# Patient Record
Sex: FEMALE | Race: BLACK OR AFRICAN AMERICAN | NOT HISPANIC OR LATINO | Employment: FULL TIME | URBAN - METROPOLITAN AREA
[De-identification: names, ages, dates, MRNs, and addresses within clinical notes are randomized per-mention and may not be internally consistent; named-entity substitution may affect disease eponyms.]

---

## 2023-09-13 ENCOUNTER — LAB (OUTPATIENT)
Dept: LAB | Facility: LAB | Age: 63
End: 2023-09-13
Payer: COMMERCIAL

## 2023-09-13 LAB
CHOLESTEROL (MG/DL) IN SER/PLAS: 241 MG/DL (ref 0–199)
CHOLESTEROL IN HDL (MG/DL) IN SER/PLAS: 67.6 MG/DL
CHOLESTEROL/HDL RATIO: 3.6
ESTIMATED AVERAGE GLUCOSE FOR HBA1C: 105 MG/DL
HEMOGLOBIN A1C/HEMOGLOBIN TOTAL IN BLOOD: 5.3 %
LDL: 152 MG/DL (ref 0–99)
THYROTROPIN (MIU/L) IN SER/PLAS BY DETECTION LIMIT <= 0.05 MIU/L: 0.54 MIU/L (ref 0.44–3.98)
TRIGLYCERIDE (MG/DL) IN SER/PLAS: 107 MG/DL (ref 0–149)
VLDL: 21 MG/DL (ref 0–40)

## 2023-10-02 PROBLEM — M54.50 LUMBAGO: Status: ACTIVE | Noted: 2023-10-02

## 2023-10-02 PROBLEM — D64.9 ANEMIA: Status: ACTIVE | Noted: 2022-05-20
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
## 2023-10-02 PROBLEM — G43.009 MIGRAINE WITHOUT AURA AND WITHOUT STATUS MIGRAINOSUS, NOT INTRACTABLE: Status: ACTIVE | Noted: 2023-10-02

## 2023-10-02 PROBLEM — J45.20 MILD INTERMITTENT ASTHMA WITHOUT COMPLICATION (HHS-HCC): Status: ACTIVE | Noted: 2023-10-02

## 2023-10-02 PROBLEM — R07.9 CHEST PAIN: Status: ACTIVE | Noted: 2023-10-02

## 2023-10-02 PROBLEM — N23 KIDNEY PAIN: Status: ACTIVE | Noted: 2023-10-02

## 2023-10-02 PROBLEM — Z87.891 PERSONAL HISTORY OF NICOTINE DEPENDENCE: Status: ACTIVE | Noted: 2022-05-20

## 2023-10-02 PROBLEM — M54.9 BACK PAIN: Status: ACTIVE | Noted: 2023-10-02

## 2023-10-02 PROBLEM — R26.89 OTHER ABNORMALITIES OF GAIT AND MOBILITY: Status: ACTIVE | Noted: 2023-10-02

## 2023-10-02 PROBLEM — Z79.51 LONG TERM (CURRENT) USE OF INHALED STEROIDS: Status: ACTIVE | Noted: 2022-05-20

## 2023-10-02 PROBLEM — E04.9 GOITER: Status: ACTIVE | Noted: 2023-10-02

## 2023-10-02 PROBLEM — J45.909 UNSPECIFIED ASTHMA, UNCOMPLICATED (HHS-HCC): Status: ACTIVE | Noted: 2022-05-20

## 2023-10-02 PROBLEM — Z91.81 HISTORY OF FALLING: Status: ACTIVE | Noted: 2022-05-20

## 2023-10-02 PROBLEM — I10 ESSENTIAL (PRIMARY) HYPERTENSION: Status: ACTIVE | Noted: 2022-05-20

## 2023-10-02 PROBLEM — L98.9 BUMPS ON SKIN: Status: ACTIVE | Noted: 2023-10-02

## 2023-10-02 PROBLEM — N20.0 LEFT NEPHROLITHIASIS: Status: ACTIVE | Noted: 2023-10-02

## 2023-10-02 PROBLEM — M21.00: Status: ACTIVE | Noted: 2022-05-20

## 2023-10-02 PROBLEM — R10.9 LEFT FLANK PAIN: Status: ACTIVE | Noted: 2023-10-02
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
## 2023-10-02 PROBLEM — I10 HYPERTENSION: Status: ACTIVE | Noted: 2023-10-02

## 2023-10-02 PROBLEM — N28.89 RENAL MASS: Status: ACTIVE | Noted: 2023-10-02

## 2023-10-02 PROBLEM — Z47.1 AFTERCARE FOLLOWING JOINT REPLACEMENT SURGERY: Status: ACTIVE | Noted: 2022-05-20

## 2023-10-02 PROBLEM — Z96.649 HIP JOINT REPLACEMENT STATUS: Status: ACTIVE | Noted: 2023-10-02

## 2023-10-02 PROBLEM — M17.12 PRIMARY OSTEOARTHRITIS OF LEFT KNEE: Status: ACTIVE | Noted: 2022-05-20

## 2023-10-02 PROBLEM — E66.9 OBESITY, CLASS I, BMI 30-34.9: Status: ACTIVE | Noted: 2023-10-02

## 2023-10-02 PROBLEM — Z96.643 HISTORY OF HIP REPLACEMENT, TOTAL, BILATERAL: Status: ACTIVE | Noted: 2023-10-02

## 2023-10-02 PROBLEM — Z96.652 PRESENCE OF LEFT ARTIFICIAL KNEE JOINT: Status: ACTIVE | Noted: 2022-05-20

## 2023-10-02 PROBLEM — R11.0 NAUSEA IN ADULT: Status: ACTIVE | Noted: 2023-10-02

## 2023-10-02 PROBLEM — M48.061 SPINAL STENOSIS OF LUMBAR REGION WITHOUT NEUROGENIC CLAUDICATION: Status: ACTIVE | Noted: 2023-10-02

## 2023-10-02 PROBLEM — E78.5 HYPERLIPIDEMIA, UNSPECIFIED: Status: ACTIVE | Noted: 2022-05-20

## 2023-10-02 PROBLEM — E66.811 OBESITY, CLASS I, BMI 30-34.9: Status: ACTIVE | Noted: 2023-10-02

## 2023-10-02 PROBLEM — Z79.891 LONG TERM CURRENT USE OF OPIATE ANALGESIC: Status: ACTIVE | Noted: 2022-05-20

## 2023-10-02 PROBLEM — M17.9 OA (OSTEOARTHRITIS) OF KNEE: Status: ACTIVE | Noted: 2023-10-02

## 2023-10-02 PROBLEM — Z96.643 PRESENCE OF ARTIFICIAL HIP JOINT, BILATERAL: Status: ACTIVE | Noted: 2022-05-20

## 2023-10-02 PROBLEM — L02.91 ABSCESS OF SKIN: Status: ACTIVE | Noted: 2023-10-02

## 2023-10-02 PROBLEM — E78.5 HYPERLIPIDEMIA: Status: ACTIVE | Noted: 2023-10-02

## 2023-10-02 RX ORDER — ALBUTEROL SULFATE 90 UG/1
1-2 AEROSOL, METERED RESPIRATORY (INHALATION) EVERY 6 HOURS PRN
COMMUNITY
Start: 2022-05-01

## 2023-10-02 RX ORDER — TOPIRAMATE 100 MG/1
100 TABLET, FILM COATED ORAL 2 TIMES DAILY
COMMUNITY
End: 2024-02-27 | Stop reason: WASHOUT

## 2023-10-02 RX ORDER — ASCORBIC ACID 500 MG
500 TABLET ORAL DAILY
COMMUNITY
Start: 2016-11-01

## 2023-10-02 RX ORDER — SUMATRIPTAN SUCCINATE 25 MG/1
25 TABLET ORAL DAILY PRN
COMMUNITY

## 2023-10-02 RX ORDER — ASPIRIN 81 MG/1
81 TABLET ORAL ONCE
COMMUNITY
Start: 2022-05-31 | End: 2024-02-12 | Stop reason: SINTOL

## 2023-10-02 RX ORDER — FLUTICASONE PROPIONATE 50 MCG
2 SPRAY, SUSPENSION (ML) NASAL DAILY
COMMUNITY
Start: 2022-05-31 | End: 2023-11-07 | Stop reason: ALTCHOICE

## 2023-10-02 RX ORDER — CALCIUM CARBONATE 400(1000)
800 TABLET,CHEWABLE ORAL EVERY 2 HOUR PRN
COMMUNITY
Start: 2022-05-27 | End: 2023-11-07 | Stop reason: ALTCHOICE

## 2023-10-02 RX ORDER — ACETAMINOPHEN 500 MG
500 TABLET ORAL EVERY 4 HOURS PRN
COMMUNITY
Start: 2023-07-10 | End: 2024-02-24 | Stop reason: HOSPADM

## 2023-10-02 RX ORDER — DOCUSATE SODIUM 100 MG/1
100 CAPSULE, LIQUID FILLED ORAL 2 TIMES DAILY
COMMUNITY
End: 2023-11-07 | Stop reason: ALTCHOICE

## 2023-10-02 RX ORDER — MINERAL OIL
180 ENEMA (ML) RECTAL DAILY PRN
COMMUNITY
Start: 2020-05-26

## 2023-10-02 RX ORDER — IBUPROFEN 800 MG/1
800 TABLET ORAL EVERY 6 HOURS PRN
COMMUNITY
Start: 2023-07-10 | End: 2023-11-07 | Stop reason: ALTCHOICE

## 2023-10-02 RX ORDER — MULTIVIT-MIN/IRON/FOLIC ACID/K 45-800-120
CAPSULE ORAL
COMMUNITY
Start: 2016-11-01 | End: 2024-03-05 | Stop reason: WASHOUT

## 2023-10-02 RX ORDER — AMOXICILLIN 500 MG/1
500 CAPSULE ORAL EVERY 8 HOURS SCHEDULED
COMMUNITY
Start: 2023-07-10 | End: 2023-10-30

## 2023-10-02 RX ORDER — NEBULIZER AND COMPRESSOR
EACH MISCELLANEOUS
COMMUNITY
Start: 2020-09-28 | End: 2024-03-05 | Stop reason: WASHOUT

## 2023-10-02 RX ORDER — OXYCODONE HYDROCHLORIDE 5 MG/1
5 CAPSULE ORAL EVERY 6 HOURS PRN
COMMUNITY
Start: 2022-05-31 | End: 2023-11-07 | Stop reason: ALTCHOICE

## 2023-10-02 RX ORDER — HYDROCHLOROTHIAZIDE 25 MG/1
25 TABLET ORAL DAILY
COMMUNITY
Start: 2022-05-01 | End: 2023-11-07 | Stop reason: SDUPTHER

## 2023-10-02 RX ORDER — CLINDAMYCIN HYDROCHLORIDE 150 MG/1
150 CAPSULE ORAL EVERY 6 HOURS
COMMUNITY
Start: 2023-08-23 | End: 2023-11-07 | Stop reason: ALTCHOICE

## 2023-10-02 RX ORDER — ACETAMINOPHEN 325 MG/1
325 TABLET ORAL EVERY 6 HOURS PRN
COMMUNITY
Start: 2022-05-20 | End: 2024-03-19 | Stop reason: WASHOUT

## 2023-10-02 RX ORDER — KETOROLAC TROMETHAMINE 10 MG/1
1 TABLET, FILM COATED ORAL EVERY 6 HOURS PRN
COMMUNITY
Start: 2023-08-25 | End: 2023-11-07 | Stop reason: ALTCHOICE

## 2023-10-02 RX ORDER — METOPROLOL SUCCINATE 50 MG/1
50 TABLET, EXTENDED RELEASE ORAL DAILY
COMMUNITY
Start: 2022-05-01 | End: 2023-11-07 | Stop reason: SDUPTHER

## 2023-10-02 RX ORDER — NITROGLYCERIN 0.4 MG/1
0.4 TABLET SUBLINGUAL EVERY 5 MIN PRN
COMMUNITY

## 2023-10-02 RX ORDER — ALBUTEROL SULFATE 90 UG/1
2 AEROSOL, METERED RESPIRATORY (INHALATION) EVERY 6 HOURS PRN
COMMUNITY
End: 2024-02-24 | Stop reason: HOSPADM

## 2023-10-02 RX ORDER — ONDANSETRON 4 MG/1
4 TABLET, ORALLY DISINTEGRATING ORAL EVERY 8 HOURS PRN
COMMUNITY
Start: 2021-05-25 | End: 2023-11-07 | Stop reason: ALTCHOICE

## 2023-10-02 RX ORDER — NAPROXEN 500 MG/1
500 TABLET ORAL 2 TIMES DAILY
COMMUNITY
Start: 2023-08-23 | End: 2023-11-07 | Stop reason: ALTCHOICE

## 2023-10-02 RX ORDER — METHOCARBAMOL 500 MG/1
500 TABLET, FILM COATED ORAL NIGHTLY
COMMUNITY
Start: 2023-05-26 | End: 2023-11-07 | Stop reason: ALTCHOICE

## 2023-10-23 ENCOUNTER — HOSPITAL ENCOUNTER (EMERGENCY)
Facility: HOSPITAL | Age: 63
End: 2023-10-23
Payer: COMMERCIAL

## 2023-10-25 ENCOUNTER — LAB REQUISITION (OUTPATIENT)
Dept: LAB | Facility: HOSPITAL | Age: 63
End: 2023-10-25
Payer: COMMERCIAL

## 2023-10-25 LAB — SARS-COV-2 RNA RESP QL NAA+PROBE: NOT DETECTED

## 2023-10-25 PROCEDURE — 87635 SARS-COV-2 COVID-19 AMP PRB: CPT

## 2023-10-30 ENCOUNTER — HOSPITAL ENCOUNTER (EMERGENCY)
Facility: HOSPITAL | Age: 63
Discharge: HOME | End: 2023-10-30
Payer: COMMERCIAL

## 2023-10-30 VITALS
TEMPERATURE: 97.6 F | OXYGEN SATURATION: 98 % | RESPIRATION RATE: 18 BRPM | SYSTOLIC BLOOD PRESSURE: 164 MMHG | DIASTOLIC BLOOD PRESSURE: 83 MMHG | HEART RATE: 85 BPM

## 2023-10-30 DIAGNOSIS — J02.9 ACUTE PHARYNGITIS, UNSPECIFIED ETIOLOGY: Primary | ICD-10-CM

## 2023-10-30 PROCEDURE — 99284 EMERGENCY DEPT VISIT MOD MDM: CPT | Performed by: NURSE PRACTITIONER

## 2023-10-30 PROCEDURE — S0119 ONDANSETRON 4 MG: HCPCS | Performed by: NURSE PRACTITIONER

## 2023-10-30 PROCEDURE — 2500000001 HC RX 250 WO HCPCS SELF ADMINISTERED DRUGS (ALT 637 FOR MEDICARE OP): Performed by: NURSE PRACTITIONER

## 2023-10-30 PROCEDURE — 99283 EMERGENCY DEPT VISIT LOW MDM: CPT

## 2023-10-30 PROCEDURE — 2500000005 HC RX 250 GENERAL PHARMACY W/O HCPCS: Performed by: NURSE PRACTITIONER

## 2023-10-30 PROCEDURE — 2500000004 HC RX 250 GENERAL PHARMACY W/ HCPCS (ALT 636 FOR OP/ED): Performed by: NURSE PRACTITIONER

## 2023-10-30 RX ORDER — AMOXICILLIN 250 MG/1
500 CAPSULE ORAL ONCE
Status: COMPLETED | OUTPATIENT
Start: 2023-10-30 | End: 2023-10-30

## 2023-10-30 RX ORDER — ONDANSETRON 4 MG/1
4 TABLET, ORALLY DISINTEGRATING ORAL ONCE
Status: COMPLETED | OUTPATIENT
Start: 2023-10-30 | End: 2023-10-30

## 2023-10-30 RX ORDER — AMOXICILLIN 500 MG/1
500 CAPSULE ORAL EVERY 12 HOURS SCHEDULED
Qty: 20 CAPSULE | Refills: 0 | Status: SHIPPED | OUTPATIENT
Start: 2023-10-30 | End: 2023-11-07 | Stop reason: ALTCHOICE

## 2023-10-30 RX ADMIN — ONDANSETRON 4 MG: 4 TABLET, ORALLY DISINTEGRATING ORAL at 19:52

## 2023-10-30 RX ADMIN — AMOXICILLIN 500 MG: 250 CAPSULE ORAL at 19:51

## 2023-10-30 RX ADMIN — DEXAMETHASONE 8 MG: 2 TABLET ORAL at 19:52

## 2023-10-30 ASSESSMENT — COLUMBIA-SUICIDE SEVERITY RATING SCALE - C-SSRS
2. HAVE YOU ACTUALLY HAD ANY THOUGHTS OF KILLING YOURSELF?: NO
6. HAVE YOU EVER DONE ANYTHING, STARTED TO DO ANYTHING, OR PREPARED TO DO ANYTHING TO END YOUR LIFE?: NO
1. IN THE PAST MONTH, HAVE YOU WISHED YOU WERE DEAD OR WISHED YOU COULD GO TO SLEEP AND NOT WAKE UP?: NO

## 2023-10-30 ASSESSMENT — ENCOUNTER SYMPTOMS: SORE THROAT: 1

## 2023-10-30 NOTE — ED PROVIDER NOTES
Emergency Department Encounter  [facility] EMERGENCY MEDICINE    Patient: Juju Adams  MRN: [id number]  : 1960  Date of Evaluation: 10/30/2023  ED Provider: MAGAN Butt      Chief Complaint       Chief Complaint   Patient presents with    Sore Throat     Sore throat X 1 week, took covid negative, pt talking in complete sentences         Limitations to History: none  Historian: patient  Records reviewed: EMR inpatient and outpatient notes, Care Everywhere    This is a 63-year-old female who presents to the emergency room with a sore throat and ear pain.  Patient states that she has had a sore throat for over 1 week.  Patient states that she took a COVID test 1 week ago which was negative.  Patient states that her pain got worse last night.  Patient endorses a subjective fever and has pain with coughing.  Patient describes her pain as a throbbing, constant pain rating it a 9 out of 10.  Patient has been taking Tylenol and cough drops without any relief of symptoms.  Patient states that her manager sent her down to the emergency room to be evaluated for her symptoms.    PMH: HTN  PSH: Bilateral hip surgery. Left knee surgery  Allergies: Sulfa  Social HX: Denies smoking, alcohol or drug use.  Family HX: No family history pertinent to current presenting problem  Medications: reviewed per EMR      ROS:     Review of Systems   HENT:  Positive for ear pain and sore throat.      14 systems reviewed and otherwise acutely negative except as in the [place].    Physical Exam:    Appearance: Alert, oriented , cooperative,  in no acute distress. Well nourished & well hydrated.    Skin: Intact,  dry skin, no lesions, rash, petechiae or purpura.     ENT: Hearing grossly intact. External auditory canals patent, tympanic membranes intact with visible landmarks. Nares patent, mucus membranes moist. Dentition without lesions. Pharynx clear, uvula midline. Mild posterior pharynx erythema. No  exudate.    Neck: Supple, without meningismus.     Pulmonary: Clear bilaterally with good chest wall excursion. No rales, rhonchi or wheezing. No accessory muscle use or stridor.    Cardiac: Normal S1, S2 without murmur, rub, gallop or extrasystole. No JVD, Carotids without bruits.    Abdomen: Soft, nontender, active bowel sounds.  No palpable organomegaly.  No rebound or guarding.      Musculoskeletal: Full range of motion. no pain, edema, or deformity. Pulses full and equal. No cyanosis, clubbing, or edema.    Psychiatric: Appropriate mood and affect.     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    Other specified cough 02/18/2019    Post-viral cough syndrome    Personal history of other diseases of the respiratory system 12/26/2017    History of acute bronchitis with bronchospasm    Personal history of other diseases of the respiratory system 07/31/2018    History of acute sinusitis    Personal history of other drug therapy 11/01/2016    History of influenza vaccination    Personal history of other infectious and parasitic diseases 04/13/2015    History of herpes zoster     Past Surgical History:   Procedure Laterality Date    TOTAL HIP ARTHROPLASTY  02/13/2014    Total Hip Replacement    TUBAL LIGATION  01/04/2018    Tubal Ligation         Medications/Allergies     Previous Medications    ACETAMINOPHEN (TYLENOL) 325 MG TABLET    Take 1 tablet (325 mg) by mouth every 4 hours if needed.    ACETAMINOPHEN (TYLENOL) 500 MG TABLET    Take 1 tablet (500 mg) by mouth every 4 hours if needed.    ALBUTEROL 90 MCG/ACTUATION INHALER    Inhale 2 puffs every 4 hours if needed.    ASCORBIC ACID, VITAMIN C, 500 MG CAPSULE    Take by mouth.    ASPIRIN (ADULT LOW DOSE ASPIRIN) 81 MG EC TABLET    Take 1 tablet (81 mg) by mouth 1 time.    ASPIRIN-CALCIUM CARBONATE 81 MG-300 MG CALCIUM(777 MG) TABLET    Take 1 tablet by mouth once daily.     CALCIUM CARBONATE (TUMS ULTRA) 400 MG CALCIUM (1,000 MG) CHEWABLE TABLET    Chew 2 tablets (800 mg) every 2 hours if needed for indigestion.    CEPHALEXIN (KEFLEX) 500 MG CAPSULE    TAKE 1 CAPSULE BY MOUTH FOUR TIMES A DAY    CLINDAMYCIN (CLEOCIN) 150 MG CAPSULE    Take 1 capsule (150 mg) by mouth every 6 hours.    DICLOFENAC SODIUM 1 % KIT    APPLY 2 TO 4 GRAMS EXTERNALLY TO THE AFFECTED AREA FOUR TIMES DAILY    DOCUSATE SODIUM (COLACE) 100 MG CAPSULE    Take 1 capsule (100 mg) by mouth 2 times a day.    FEXOFENADINE (ALLEGRA ALLERGY) 180 MG TABLET    Take 1 tablet (180 mg) by mouth once daily.    FLUTICASONE (FLONASE) 50 MCG/ACTUATION NASAL SPRAY    Administer 2 sprays into each nostril once daily.    HYDROCHLOROTHIAZIDE (HYDRODIURIL) 25 MG TABLET    TAKE 1 TABLET BY MOUTH EVERY DAY AS DIRECTED    HYDROCHLOROTHIAZIDE (HYDRODIURIL) 25 MG TABLET    Take 1 tablet (25 mg) by mouth once daily.    IBUPROFEN 800 MG TABLET    Take 1 tablet (800 mg) by mouth every 6 hours if needed.    KETOROLAC (TORADOL) 10 MG TABLET    Take 1 tablet (10 mg) by mouth every 6 hours if needed.    METHOCARBAMOL (ROBAXIN) 500 MG TABLET    Take 1 tablet (500 mg) by mouth once daily at bedtime.    METOPROLOL SUCCINATE XL (TOPROL-XL) 50 MG 24 HR TABLET    TAKE 1 TABLET BY MOUTH EVERY DAY    METOPROLOL SUCCINATE XL (TOPROL-XL) 50 MG 24 HR TABLET    Take 1 tablet (50 mg) by mouth once daily.    MULTIVITAMIN-MIN-IRON-FA-VIT K (BARIATRIC MULTIVITAMINS) 45 MG IRON- 800 MCG-120 MCG CAPSULE    Take by mouth.    NAPROXEN (NAPROSYN) 500 MG TABLET    Take 1 tablet (500 mg) by mouth 2 times a day.    NEBULIZER AND COMPRESSOR DEVICE    USE AS DIRECTED.    NITROGLYCERIN (NITROSTAT) 0.4 MG SL TABLET    Place 1 tablet (0.4 mg) under the tongue every 5 minutes if needed.    ONDANSETRON ODT (ZOFRAN-ODT) 4 MG DISINTEGRATING TABLET    Take 1 tablet (4 mg) by mouth every 8 hours if needed for nausea or vomiting.    OXYCODONE (OXY-IR) 5 MG IMMEDIATE RELEASE CAPSULE     Take 1 capsule (5 mg) by mouth every 6 hours if needed.    POLYETHYLENE GLYCOL 3350 ORAL    Take 17 g by mouth twice a day.    SIMVASTATIN (ZOCOR) 10 MG TABLET    TAKE 1 TABLET BY MOUTH EVERYDAY AT BEDTIME    SIMVASTATIN (ZOCOR) 10 MG TABLET    Take 1 tablet (10 mg) by mouth once daily at bedtime.    SUMATRIPTAN (IMITREX) 25 MG TABLET    Take 1 tablet (25 mg) by mouth 1 time if needed.    TOPIRAMATE (TOPAMAX) 100 MG TABLET    Take 1 tablet (100 mg) by mouth 2 times a day.    VENTOLIN HFA 90 MCG/ACTUATION INHALER    Inhale 2 puffs every 6 hours if needed.     Allergies   Allergen Reactions    Latex Unknown    Other Unknown    Sulfa (Sulfonamide Antibiotics) Unknown and Hives    Triethanolamine Oleate Unknown        Physical Exam       ED Triage Vitals [10/30/23 1718]   Temp Heart Rate Resp BP   36.4 °C (97.6 °F) 95 16 164/83      SpO2 Temp src Heart Rate Source Patient Position   100 % -- -- --      BP Location FiO2 (%)     -- --           Diagnostics   Labs:  No results found for this or any previous visit (from the past 24 hour(s)).   Radiographs:  No orders to display         Assessment   In brief, Juju Adams is a 63 y.o. female who presented to the emergency department with a sore throat and left ear pain.          ED Course   Visit Vitals  /83   Pulse 95   Temp 36.4 °C (97.6 °F)   Resp 16   SpO2 100%   OB Status Perimenopausal   Smoking Status Never Assessed       Medications   dexAMETHasone (Decadron) tablet 8 mg (has no administration in time range)   amoxicillin (Amoxil) capsule 500 mg (has no administration in time range)   ondansetron ODT (Zofran-ODT) disintegrating tablet 4 mg (has no administration in time range)       Patient remained stable while in the emergency department. Previous outpatient and ED records were reviewed. Outside records were reviewed.  Strep swab was obtained, pending results.  Patient received dexamethasone 8 mg p.o., amoxicillin and Zofran 4 mg ODT.  There was no  exudate or tonsillar abscess noted on exam today.  Airways intact and vital signs were stable.  Since symptoms have been ongoing for over 1 week with subjective fevers yesterday, patient will be treated for acute pharyngitis.  Patient received a prescription for amoxicillin twice daily for 10 days and was advised to take over-the-counter pain medications as needed. Patient was advised to follow up with her PCP and return to the emergency department with worsening symptoms.    Final Impression      1. Acute pharyngitis, unspecified etiology          DISPOSITION  Disposition: Discharged home    Comment: Please note this report has been produced using speech recognition software and may contain errors related to that system including errors in grammar, punctuation, and spelling, as well as words and phrases that may be inappropriate.  If there are any questions or concerns please feel free to contact the dictating provider for clarification.    MICHELLE Butt-MAGAN Navarro  10/30/23 1947

## 2023-10-31 ENCOUNTER — PHARMACY VISIT (OUTPATIENT)
Dept: PHARMACY | Facility: CLINIC | Age: 63
End: 2023-10-31
Payer: COMMERCIAL

## 2023-10-31 PROCEDURE — RXMED WILLOW AMBULATORY MEDICATION CHARGE

## 2023-10-31 RX ORDER — AMOXICILLIN 500 MG/1
500 CAPSULE ORAL EVERY 12 HOURS
Qty: 20 CAPSULE | Refills: 0 | OUTPATIENT
Start: 2023-10-30 | End: 2023-11-07 | Stop reason: ALTCHOICE

## 2023-11-02 ENCOUNTER — HOSPITAL ENCOUNTER (EMERGENCY)
Facility: HOSPITAL | Age: 63
Discharge: HOME | End: 2023-11-03
Attending: EMERGENCY MEDICINE
Payer: COMMERCIAL

## 2023-11-02 DIAGNOSIS — N95.0 POSTMENOPAUSAL VAGINAL BLEEDING: Primary | ICD-10-CM

## 2023-11-02 DIAGNOSIS — K64.2 GRADE III HEMORRHOIDS: ICD-10-CM

## 2023-11-02 LAB
ABO GROUP (TYPE) IN BLOOD: NORMAL
ALBUMIN SERPL BCP-MCNC: 4.3 G/DL (ref 3.4–5)
ALP SERPL-CCNC: 117 U/L (ref 33–136)
ALT SERPL W P-5'-P-CCNC: 17 U/L (ref 7–45)
ANION GAP SERPL CALC-SCNC: 13 MMOL/L (ref 10–20)
ANTIBODY SCREEN: NORMAL
APTT PPP: 32 SECONDS (ref 27–38)
AST SERPL W P-5'-P-CCNC: 14 U/L (ref 9–39)
BASOPHILS # BLD AUTO: 0.07 X10*3/UL (ref 0–0.1)
BASOPHILS NFR BLD AUTO: 0.8 %
BILIRUB SERPL-MCNC: 0.4 MG/DL (ref 0–1.2)
BUN SERPL-MCNC: 23 MG/DL (ref 6–23)
CALCIUM SERPL-MCNC: 10.4 MG/DL (ref 8.6–10.6)
CHLORIDE SERPL-SCNC: 101 MMOL/L (ref 98–107)
CO2 SERPL-SCNC: 30 MMOL/L (ref 21–32)
CREAT SERPL-MCNC: 0.9 MG/DL (ref 0.5–1.05)
EOSINOPHIL # BLD AUTO: 0.17 X10*3/UL (ref 0–0.7)
EOSINOPHIL NFR BLD AUTO: 1.9 %
ERYTHROCYTE [DISTWIDTH] IN BLOOD BY AUTOMATED COUNT: 12.5 % (ref 11.5–14.5)
GFR SERPL CREATININE-BSD FRML MDRD: 72 ML/MIN/1.73M*2
GLUCOSE SERPL-MCNC: 89 MG/DL (ref 74–99)
HCT VFR BLD AUTO: 44.1 % (ref 36–46)
HGB BLD-MCNC: 14.3 G/DL (ref 12–16)
IMM GRANULOCYTES # BLD AUTO: 0.03 X10*3/UL (ref 0–0.7)
IMM GRANULOCYTES NFR BLD AUTO: 0.3 % (ref 0–0.9)
INR PPP: 0.9 (ref 0.9–1.1)
LYMPHOCYTES # BLD AUTO: 3.69 X10*3/UL (ref 1.2–4.8)
LYMPHOCYTES NFR BLD AUTO: 41.5 %
MCH RBC QN AUTO: 31.3 PG (ref 26–34)
MCHC RBC AUTO-ENTMCNC: 32.4 G/DL (ref 32–36)
MCV RBC AUTO: 97 FL (ref 80–100)
MONOCYTES # BLD AUTO: 0.66 X10*3/UL (ref 0.1–1)
MONOCYTES NFR BLD AUTO: 7.4 %
NEUTROPHILS # BLD AUTO: 4.27 X10*3/UL (ref 1.2–7.7)
NEUTROPHILS NFR BLD AUTO: 48.1 %
NRBC BLD-RTO: 0 /100 WBCS (ref 0–0)
PLATELET # BLD AUTO: 294 X10*3/UL (ref 150–450)
POTASSIUM SERPL-SCNC: 3.6 MMOL/L (ref 3.5–5.3)
PROT SERPL-MCNC: 8 G/DL (ref 6.4–8.2)
PROTHROMBIN TIME: 9.8 SECONDS (ref 9.8–12.8)
RBC # BLD AUTO: 4.57 X10*6/UL (ref 4–5.2)
RH FACTOR (ANTIGEN D): NORMAL
SODIUM SERPL-SCNC: 140 MMOL/L (ref 136–145)
WBC # BLD AUTO: 8.9 X10*3/UL (ref 4.4–11.3)

## 2023-11-02 PROCEDURE — 99285 EMERGENCY DEPT VISIT HI MDM: CPT | Performed by: EMERGENCY MEDICINE

## 2023-11-02 PROCEDURE — 80053 COMPREHEN METABOLIC PANEL: CPT | Performed by: EMERGENCY MEDICINE

## 2023-11-02 PROCEDURE — 85025 COMPLETE CBC W/AUTO DIFF WBC: CPT | Performed by: EMERGENCY MEDICINE

## 2023-11-02 PROCEDURE — 96374 THER/PROPH/DIAG INJ IV PUSH: CPT

## 2023-11-02 PROCEDURE — 36415 COLL VENOUS BLD VENIPUNCTURE: CPT | Performed by: EMERGENCY MEDICINE

## 2023-11-02 PROCEDURE — 85610 PROTHROMBIN TIME: CPT | Performed by: EMERGENCY MEDICINE

## 2023-11-02 PROCEDURE — 86901 BLOOD TYPING SEROLOGIC RH(D): CPT | Performed by: EMERGENCY MEDICINE

## 2023-11-02 PROCEDURE — 96375 TX/PRO/DX INJ NEW DRUG ADDON: CPT

## 2023-11-02 PROCEDURE — 85730 THROMBOPLASTIN TIME PARTIAL: CPT | Performed by: EMERGENCY MEDICINE

## 2023-11-02 ASSESSMENT — COLUMBIA-SUICIDE SEVERITY RATING SCALE - C-SSRS
1. IN THE PAST MONTH, HAVE YOU WISHED YOU WERE DEAD OR WISHED YOU COULD GO TO SLEEP AND NOT WAKE UP?: NO
2. HAVE YOU ACTUALLY HAD ANY THOUGHTS OF KILLING YOURSELF?: NO
6. HAVE YOU EVER DONE ANYTHING, STARTED TO DO ANYTHING, OR PREPARED TO DO ANYTHING TO END YOUR LIFE?: NO

## 2023-11-02 NOTE — Clinical Note
Juju Adams was seen and treated in our emergency department on 11/2/2023.  She may return to work on 11/06/2023.       If you have any questions or concerns, please don't hesitate to call.      Shonna Salcedo MD

## 2023-11-02 NOTE — ED TRIAGE NOTES
VIRIDIANA HIGHTOWER is a 63y old F with a PMHx significant for HTN is presenting to [facility] ED with a chief concern for both vaginal and rectal bleeding. Onset of symptoms began around 1800 tonight. Pt works here at  in the [employer] and stated when she arrived home, she use the restroom and noticed dark red blood with dime sized clots. Pt states she only had two liners at home, and used both within a one-hour period. Pt also endorses associated lower abdominal cramping. OF NOTE: Pt  does take ASA 81mg daily and states she has a known cyst on her L kidney. She is afebrile, without dizziness, or syncopal-like episodes. No recent falls or injuries verbalized. Denies any nausea, vomiting, constipation or current diarrhea. No recent sick contacts or COVID symptoms. Allergies documented in EMR.

## 2023-11-03 ENCOUNTER — APPOINTMENT (OUTPATIENT)
Dept: RADIOLOGY | Facility: HOSPITAL | Age: 63
End: 2023-11-03
Payer: COMMERCIAL

## 2023-11-03 VITALS
WEIGHT: 185 LBS | HEART RATE: 85 BPM | BODY MASS INDEX: 32.78 KG/M2 | OXYGEN SATURATION: 100 % | DIASTOLIC BLOOD PRESSURE: 78 MMHG | HEIGHT: 63 IN | SYSTOLIC BLOOD PRESSURE: 148 MMHG | TEMPERATURE: 97.5 F | RESPIRATION RATE: 18 BRPM

## 2023-11-03 PROCEDURE — 2550000001 HC RX 255 CONTRASTS

## 2023-11-03 PROCEDURE — 74177 CT ABD & PELVIS W/CONTRAST: CPT

## 2023-11-03 PROCEDURE — 76830 TRANSVAGINAL US NON-OB: CPT | Mod: 59,GC | Performed by: RADIOLOGY

## 2023-11-03 PROCEDURE — 74177 CT ABD & PELVIS W/CONTRAST: CPT | Performed by: RADIOLOGY

## 2023-11-03 PROCEDURE — 2500000004 HC RX 250 GENERAL PHARMACY W/ HCPCS (ALT 636 FOR OP/ED)

## 2023-11-03 PROCEDURE — 76830 TRANSVAGINAL US NON-OB: CPT

## 2023-11-03 PROCEDURE — 76856 US EXAM PELVIC COMPLETE: CPT | Performed by: RADIOLOGY

## 2023-11-03 RX ORDER — ONDANSETRON HYDROCHLORIDE 2 MG/ML
4 INJECTION, SOLUTION INTRAVENOUS ONCE
Status: COMPLETED | OUTPATIENT
Start: 2023-11-03 | End: 2023-11-03

## 2023-11-03 RX ORDER — MORPHINE SULFATE 4 MG/ML
2 INJECTION INTRAVENOUS ONCE
Status: COMPLETED | OUTPATIENT
Start: 2023-11-03 | End: 2023-11-03

## 2023-11-03 RX ADMIN — MORPHINE SULFATE 2 MG: 4 INJECTION INTRAVENOUS at 01:47

## 2023-11-03 RX ADMIN — ONDANSETRON 4 MG: 2 INJECTION INTRAMUSCULAR; INTRAVENOUS at 01:47

## 2023-11-03 RX ADMIN — IOHEXOL 100 ML: 350 INJECTION, SOLUTION INTRAVENOUS at 04:26

## 2023-11-03 ASSESSMENT — PAIN SCALES - GENERAL
PAINLEVEL_OUTOF10: 6
PAINLEVEL_OUTOF10: 4
PAINLEVEL_OUTOF10: 0 - NO PAIN
PAINLEVEL_OUTOF10: 8

## 2023-11-03 ASSESSMENT — PAIN DESCRIPTION - PAIN TYPE: TYPE: ACUTE PAIN

## 2023-11-03 ASSESSMENT — PAIN - FUNCTIONAL ASSESSMENT: PAIN_FUNCTIONAL_ASSESSMENT: 0-10

## 2023-11-03 ASSESSMENT — LIFESTYLE VARIABLES
EVER HAD A DRINK FIRST THING IN THE MORNING TO STEADY YOUR NERVES TO GET RID OF A HANGOVER: NO
HAVE YOU EVER FELT YOU SHOULD CUT DOWN ON YOUR DRINKING: NO
REASON UNABLE TO ASSESS: NO
EVER FELT BAD OR GUILTY ABOUT YOUR DRINKING: NO
HAVE PEOPLE ANNOYED YOU BY CRITICIZING YOUR DRINKING: NO

## 2023-11-03 NOTE — ED PROVIDER NOTES
CC: Vaginal Bleeding and Rectal Bleeding       ???????????????????????????????????????????????????????????????    HPI   Chief Complaint   Patient presents with    Vaginal Bleeding    Rectal Bleeding         History provided by:  Patient   used: Lindy      Juju Adams is a 63 y.o. female with a hx of htn, HLD, asthma, renal cyst, previous history of fibroids and nephrolithiasis who presents to ED for vaginal and rectal bleeding.     Patient states when she got home from work this evening she went to the bathroom around and saw bright red blood in the toilet as well as dime-sized clots. At approximately the same time she began having LLQ and L flank pain, 8/10 in severity and constant. Per patient, bleeding is from both the vagina and the rectum. She went through 2 pantyliners in 1 hour at home prior to presenting to the ED, however once given a regular pad at the ED is bleeding <1 pad per hour. Patient is post-menopausal; LMP 14 years ago. Not on anticoagulation. Has not been dizzy, lightheaded, short of breath. No history of bleeding disorder or GI bleed in past, no history of malignancy, no family history of colorectal or gynecologic cancers. Has not had a colonoscopy. Last bowel movement yesterday.    Patient believes the bleeding is slowing.     Remote history of fibroids, heavy periods prior to menopause, nephrolithiasis.    Denies LH, syncope, CP or SOB. No nausea, vomiting, diarrhea, constipation.    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    Other specified cough 02/18/2019    Post-viral cough syndrome    Personal history of other diseases of the respiratory system 12/26/2017    History of acute bronchitis with bronchospasm    Personal history of other diseases of the respiratory system 07/31/2018    History of acute sinusitis    Personal history of  other drug therapy 11/01/2016    History of influenza vaccination    Personal history of other infectious and parasitic diseases 04/13/2015    History of herpes zoster     Past Surgical History:   Procedure Laterality Date    TOTAL HIP ARTHROPLASTY  02/13/2014    Total Hip Replacement    TUBAL LIGATION  01/04/2018    Tubal Ligation     Family History   Problem Relation Name Age of Onset    Hypertension Mother      Heart attack Mother      Hypertension Father      Heart attack Father      Stroke Father      Diabetes Sister      Hypertension Sister      Seizures Sister       Social History     Tobacco Use    Smoking status: Not on file    Smokeless tobacco: Not on file   Substance Use Topics    Alcohol use: Not on file    Drug use: Not on file       Physical Exam   ED Triage Vitals [11/02/23 1946]   Temp Heart Rate Resp BP   36.7 °C (98.1 °F) 99 18 144/83      SpO2 Temp Source Heart Rate Source Patient Position   100 % Oral Monitor Sitting      BP Location FiO2 (%)     Right arm --       Physical Exam  ???????????????????????????????????????????????????????????????  Triage Vitals:  T 36.7 °C (98.1 °F)  HR 99  /83  RR 18  O2 100 % None (Room air)    Vital signs reviewed in nursing triage note, EMR flow sheets, and at patient's bedside.   General: Awake, alert, in no acute distress  Eyes: Gaze conjugate.  No scleral icterus or injection  HENT: Normo-cephalic, atraumatic. No stridor. No rhinorrhea or epistaxis.  CV: Normal rate, regular rhythm. No murmurs appreciated. Radial pulses 2+ bilaterally  Respiratory: Breathing non-labored, speaking in full sentences.  Clear to auscultation bilaterally  GI: Soft, non-distended, no rebound or guarding. Mild tenderness to palpation over LLQ. On rectal exam there was one grade III external hemorrhoid, approx. 1.5cm in diameter which appeared irritated, however no active bleeding. No anal fissures and no blood on digital exam.  MSK/Extremities: No gross bony deformities.  Moving all extremities  Skin: Warm. Appropriate color. No mucosal pallor.  Neuro: Alert. Oriented. Face symmetric. Speech is fluent.  Gross strength and sensation intact in b/l UE and LEs  Psych: Appropriate mood and affect   : No labial or vaginal lesions. No blood or discharge within the vaginal vault, normal cervix coloration. No CMT or adnexal tenderness, however patient c/o pressure with palpation of L adnexa. No masses.     ED Course & MDM   Diagnoses as of 11/03/23 0325   Postmenopausal vaginal bleeding   Grade III hemorrhoids         Medical Decision Making  Juju Adams is a 63 y.o. female who presented to ED with rectal and vaginal bleeding. On exam she was well appearing.  Vital signs were reviewed w/o tachycardia or hypotension. Abd was TTP in LLQ. Pelvic exam w/o active bleeding or blood in the vaginal vault as well as pressure with palpation of L adnexa. Rectal exam with no fissures, single external hemorrhoid w/o evidence of active bleeding, no mass or blood on digital rectal exam. CBC w/o evidence of anemia, coag screen negative, CMP unremarkable. Transvaginal U/S significant for multi-fibroid uterus, fluid-filled endometrium, endometrium of normal thickness, echogenic lesion invaginating into the endometrium, with feeding artery sign, which may represent a pedunculated endometrial polyp versus pedunculated submucosal fibroid. Spoke with Ob/Gyn consultant who advised that outpatient follow up is appropriate as long as the patient is stable.    It is likely the patient's vaginal bleeding is related to the endometrial lesion seen on transvaginal U/S. Endometrium was of normal thickness so lower concern for endometrial cancer. Recommended patient follow up with Ob/Gyn as outpatient. Patient has never had screening colonoscopy and cannot rule out malignancy with CT. Low concern for peptic ulcer disease given lower GI bleeding, location of pain. Patient has hx of tubal ligation, otherwise no  abdominal surgeries so enterovaginal fistula is unlikely.     She is hemodynamically stable and does not require admission for significant active ongoing vaginal or rectal bleeding or symptomatic anemia. Given strict return precautions including any lightheadedness, difficulty breathing, chest pain, passing out, bleeding > 1pad/hr.      Independent Interpretation of Studies:  I independently interpreted:     Differential diagnoses considered include but are not limited to: Diverticulitis, hemorrhoid, enterovaginal fistula, malignancy, peptic ulcer disease, ischemic colitis       Social Determinants Limiting Care:  None identified    Discussion of Management with Other Providers:   I discussed the patient/results with Ob/Gyn consultant    Impression:  Postmenopausal bleeding, endometrial lesion, grade III external hemorrhoid    Disposition:  Discharge with Ob/Gyn and GI follow-up.      Patient seen and discussed with ED attending Dr. Inocencio Cuellar.    Shonna Salcedo MD   Emergency Medicine, PGY-1         _____________________________      This patient was seen by the resident physician.  I have seen and examined the patient, agree with the workup, evaluation, management and diagnosis. The care plan has been discussed and I concur.    My assessment reveals Patient is a 63-year-old female presenting to the emergency department for rectal and vaginal bleeding.  Patient is on a daily baby aspirin.  Patient this evening noted at 6 PM that she started to pass vaginal blood when she had wiped, in addition to blood from her rectum.  She also had a bloody bowel movement associated with left lower quadrant abdominal discomfort.  Has never had a GI bleed before.  She is tender in the left lower quadrant.  Vital signs were stable.  Rectal exam did show hemorrhoid but no gross blood.  No ongoing vaginal bleeding at this time.  Patient's labs showed a normal hemoglobin, no BUN elevation concerning for upper GI bleed, normal coags.   Patient has left lower quadrant tenderness.  Given patient's complaints, we will obtain CT and ultrasound.  Suspect that the patient will likely end up being discharged with close outpatient follow-up.      I reviewed the patient's last family medicine note from June 13, 2022 which shows history of hypertension, hyperlipidemia, osteoarthritis, prior kidney stone, migraines, history of hip replacement tubal ligation, former smoker    Disposition:    Yannick Cuellar DO  Department of Emergency Medicine  [facility]           Shonna Salcedo MD  Resident  11/05/23 1547

## 2023-11-06 NOTE — PROGRESS NOTES
"  Juju Adams is a 63 y.o. female with past medical history of HTN who is referred by Dr. Inocencio Cuellar for rectal bleeding. On 11/2/2023 she got off work and went to the bathroom. She had a formed stool and when looked in the toilet lots of \"red wine\" colored blood. She thought blood was vaginal as well as rectal. There was also dime sized clots. She states the blood was still flowing vaginally so she went to the ER (She has not had a menstrual cycle since age 51). She states she soaked a pad before even leaving the house. CBC was normal. CT showed no diverticulitis. There was fairly profound constipation limiting evaluation. Pelvic US showed numerous fibroids, possible endometrial polyp.    She did not have pain at the time but she now has pelvic tenderness that is mild. Described as pressure. She has some recent nausea within the last week. No vomiting. She has chronic bloating and reflux at baseline, but this has been worse the past 1-2 months. TUMS does not work well. In regards to her bowels, she states she had an episode of constipation a few months that required Dulcolax. Lately going regular, but stools are smaller. No recent diarrhea. She has daily bowel movements. No weight loss, but rather weight gain over the last year. She was taking Aleve 1-2 times daily due to spinal stenosis. Has not taken any since the bleeding. No prior colonoscopy.    Social history: Works at  in [employer]. Her boyfriend is a . Prior tobacco. Quit 2015. Denies alcohol and illicit drugs.    Family history: Denies family history of colon cancer or other GI disorders or malignancy.     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    Other specified cough 02/18/2019    Post-viral cough syndrome    Personal history of other diseases of the respiratory system 12/26/2017    History of acute bronchitis with bronchospasm    Personal history of other diseases " of the respiratory system 07/31/2018    History of acute sinusitis    Personal history of other drug therapy 11/01/2016    History of influenza vaccination    Personal history of other infectious and parasitic diseases 04/13/2015    History of herpes zoster     Past Surgical History:   Procedure Laterality Date    TOTAL HIP ARTHROPLASTY  02/13/2014    Total Hip Replacement    TUBAL LIGATION  01/04/2018    Tubal Ligation     Current Outpatient Medications   Medication Sig Dispense Refill    acetaminophen (Tylenol) 325 mg tablet Take 1 tablet (325 mg) by mouth every 4 hours if needed.      acetaminophen (Tylenol) 500 mg tablet Take 1 tablet (500 mg) by mouth every 4 hours if needed.      albuterol 90 mcg/actuation inhaler Inhale 2 puffs every 4 hours if needed.      ascorbic acid, vitamin C, 500 mg capsule Take by mouth.      aspirin (Adult Low Dose Aspirin) 81 mg EC tablet Take 1 tablet (81 mg) by mouth 1 time.      aspirin-calcium carbonate 81 mg-300 mg calcium(777 mg) tablet Take 1 tablet by mouth once daily.      diclofenac sodium 1 % kit APPLY 2 TO 4 GRAMS EXTERNALLY TO THE AFFECTED AREA FOUR TIMES DAILY      fexofenadine (Allegra Allergy) 180 mg tablet Take 1 tablet (180 mg) by mouth once daily.      hydroCHLOROthiazide (HYDRODiuril) 25 mg tablet TAKE 1 TABLET BY MOUTH EVERY DAY AS DIRECTED 90 tablet 1    metoprolol succinate XL (Toprol-XL) 50 mg 24 hr tablet TAKE 1 TABLET BY MOUTH EVERY DAY 90 tablet 1    multivitamin-min-iron-FA-vit K (Bariatric Multivitamins) 45 mg iron- 800 mcg-120 mcg capsule Take by mouth.      nebulizer and compressor device USE AS DIRECTED.      nitroglycerin (Nitrostat) 0.4 mg SL tablet Place 1 tablet (0.4 mg) under the tongue every 5 minutes if needed.      omeprazole (PriLOSEC) 40 mg DR capsule Take 1 capsule (40 mg) by mouth once daily. Do not crush or chew. 30 capsule 11    polyethylene glycol-electrolytes (Golytely) 420 gram solution Take 4,000 mL by mouth 1 time for 1 dose. 4000  "mL 0    simvastatin (Zocor) 10 mg tablet Take 1 tablet (10 mg) by mouth once daily at bedtime.      SUMAtriptan (Imitrex) 25 mg tablet Take 1 tablet (25 mg) by mouth 1 time if needed.      topiramate (Topamax) 100 mg tablet Take 1 tablet (100 mg) by mouth 2 times a day.      Ventolin HFA 90 mcg/actuation inhaler Inhale 2 puffs every 6 hours if needed.       No current facility-administered medications for this visit.     Allergies   Allergen Reactions    Latex Unknown    Other Unknown    Sulfa (Sulfonamide Antibiotics) Unknown and Hives    Triethanolamine Oleate Unknown       Family History   Problem Relation Name Age of Onset    Hypertension Mother      Heart attack Mother      Hypertension Father      Heart attack Father      Stroke Father      Diabetes Sister      Hypertension Sister      Seizures Sister         Review of Systems  Review of Systems negative except as noted in HPI.    Objective     /83   Pulse 81   Temp 36.4 °C (97.6 °F)   Resp 18   Ht 1.6 m (5' 3\")   Wt 90.3 kg (199 lb)   SpO2 97%   BMI 35.25 kg/m²      Physical Exam  Constitutional:  No acute distress. Normal appearance. Not ill-appearing.  HENT:  Head normocephalic and atraumatic. Conjunctivae normal.  Cardiovascular:  Normal rate. Regular rhythm.  Pulmonary:  Pulmonary effort normal. No respiratory distress. Breath sounds clear.  Abdominal:  Abdomen is soft. There is no distension. Mild pelvic tenderness with palpation. No rebound or guarding.  Skin: Dry.  Neurological:  Alert and oriented.  Psychiatric:  Mood and affect normal.    Assessment/Plan     63 y.o. female with history of HTN and hyperlipidemia who presents today for clinic visit for 1 recent episode of painless hematochezia in the setting of daily NSAID use. She also had post-menopausal vaginal bleeding and pelvic tenderness. ER work-up revealed no iron deficiency anemia. CT without diverticulitis or inflammation. Constipation limited exam. She has never had a " colonoscopy.    According to GYN, vaginal bleeding likely related to the endometrial lesion seen on transvaginal US. Endometrium was of normal thickness so lower concern for endometrial cancer. Patient to follow up with Gyn as outpatient. She has appointment with them tomorrow.    Recommendations  Avoid NSAIDS.  Start one capful Miralax daily.  Start Omeprazole 40 mg daily for 8 weeks.   Schedule colonoscopy.  Follow up 2-3 weeks after procedure.     Electronically signed by: Renata Coleman CNP on 11/7/2023 at 9:06 AM

## 2023-11-07 ENCOUNTER — OFFICE VISIT (OUTPATIENT)
Dept: GASTROENTEROLOGY | Facility: HOSPITAL | Age: 63
End: 2023-11-07
Payer: COMMERCIAL

## 2023-11-07 VITALS
WEIGHT: 199 LBS | SYSTOLIC BLOOD PRESSURE: 133 MMHG | OXYGEN SATURATION: 97 % | TEMPERATURE: 97.6 F | HEIGHT: 63 IN | DIASTOLIC BLOOD PRESSURE: 83 MMHG | HEART RATE: 81 BPM | RESPIRATION RATE: 18 BRPM | BODY MASS INDEX: 35.26 KG/M2

## 2023-11-07 DIAGNOSIS — R12 BURNING REFLUX: ICD-10-CM

## 2023-11-07 DIAGNOSIS — Z12.11 SPECIAL SCREENING FOR MALIGNANT NEOPLASMS, COLON: ICD-10-CM

## 2023-11-07 DIAGNOSIS — K92.1 HEMATOCHEZIA: Primary | ICD-10-CM

## 2023-11-07 PROCEDURE — 99214 OFFICE O/P EST MOD 30 MIN: CPT | Performed by: NURSE PRACTITIONER

## 2023-11-07 PROCEDURE — 1036F TOBACCO NON-USER: CPT | Performed by: NURSE PRACTITIONER

## 2023-11-07 PROCEDURE — 3075F SYST BP GE 130 - 139MM HG: CPT | Performed by: NURSE PRACTITIONER

## 2023-11-07 PROCEDURE — 3079F DIAST BP 80-89 MM HG: CPT | Performed by: NURSE PRACTITIONER

## 2023-11-07 PROCEDURE — 99204 OFFICE O/P NEW MOD 45 MIN: CPT | Performed by: NURSE PRACTITIONER

## 2023-11-07 RX ORDER — POLYETHYLENE GLYCOL 3350, SODIUM SULFATE ANHYDROUS, SODIUM BICARBONATE, SODIUM CHLORIDE, POTASSIUM CHLORIDE 236; 22.74; 6.74; 5.86; 2.97 G/4L; G/4L; G/4L; G/4L; G/4L
POWDER, FOR SOLUTION ORAL
Qty: 4000 ML | Refills: 0 | OUTPATIENT
Start: 2023-11-07

## 2023-11-07 RX ORDER — OMEPRAZOLE 40 MG/1
40 CAPSULE, DELAYED RELEASE ORAL DAILY
Qty: 30 CAPSULE | Refills: 11 | Status: SHIPPED | OUTPATIENT
Start: 2023-11-07 | End: 2024-02-12 | Stop reason: SDUPTHER

## 2023-11-07 RX ORDER — POLYETHYLENE GLYCOL 3350, SODIUM SULFATE ANHYDROUS, SODIUM BICARBONATE, SODIUM CHLORIDE, POTASSIUM CHLORIDE 236; 22.74; 6.74; 5.86; 2.97 G/4L; G/4L; G/4L; G/4L; G/4L
4000 POWDER, FOR SOLUTION ORAL ONCE
Qty: 4000 ML | Refills: 0 | Status: SHIPPED | OUTPATIENT
Start: 2023-11-07 | End: 2023-11-07

## 2023-11-07 SDOH — ECONOMIC STABILITY: FOOD INSECURITY: WITHIN THE PAST 12 MONTHS, YOU WORRIED THAT YOUR FOOD WOULD RUN OUT BEFORE YOU GOT MONEY TO BUY MORE.: NEVER TRUE

## 2023-11-07 SDOH — ECONOMIC STABILITY: FOOD INSECURITY: WITHIN THE PAST 12 MONTHS, THE FOOD YOU BOUGHT JUST DIDN'T LAST AND YOU DIDN'T HAVE MONEY TO GET MORE.: NEVER TRUE

## 2023-11-07 ASSESSMENT — PATIENT HEALTH QUESTIONNAIRE - PHQ9
2. FEELING DOWN, DEPRESSED OR HOPELESS: NOT AT ALL
SUM OF ALL RESPONSES TO PHQ9 QUESTIONS 1 AND 2: 0
1. LITTLE INTEREST OR PLEASURE IN DOING THINGS: NOT AT ALL

## 2023-11-07 ASSESSMENT — LIFESTYLE VARIABLES
HOW OFTEN DO YOU HAVE SIX OR MORE DRINKS ON ONE OCCASION: NEVER
AUDIT-C TOTAL SCORE: 0
SKIP TO QUESTIONS 9-10: 1
HOW OFTEN DO YOU HAVE A DRINK CONTAINING ALCOHOL: NEVER
HOW MANY STANDARD DRINKS CONTAINING ALCOHOL DO YOU HAVE ON A TYPICAL DAY: PATIENT DOES NOT DRINK

## 2023-11-07 ASSESSMENT — PAIN SCALES - GENERAL: PAINLEVEL: 3

## 2023-11-07 NOTE — PATIENT INSTRUCTIONS
Recommendations  Avoid NSAIDS.  Take one capful Miralax daily for constipation.  Start Omeprazole 40 mg in AM on empty stomach for 8 weeks.   Schedule colonoscopy. You can call [phone] to schedule.   Follow up 2-3 weeks after procedure. Please call the office at [phone] with any questions or concerns.

## 2023-11-08 ENCOUNTER — TELEPHONE (OUTPATIENT)
Dept: OBSTETRICS AND GYNECOLOGY | Facility: CLINIC | Age: 63
End: 2023-11-08

## 2023-11-08 ENCOUNTER — OFFICE VISIT (OUTPATIENT)
Dept: OBSTETRICS AND GYNECOLOGY | Facility: CLINIC | Age: 63
End: 2023-11-08
Payer: COMMERCIAL

## 2023-11-08 VITALS
DIASTOLIC BLOOD PRESSURE: 84 MMHG | BODY MASS INDEX: 34.76 KG/M2 | HEART RATE: 86 BPM | WEIGHT: 196.2 LBS | SYSTOLIC BLOOD PRESSURE: 151 MMHG

## 2023-11-08 DIAGNOSIS — K59.01 SLOW TRANSIT CONSTIPATION: ICD-10-CM

## 2023-11-08 DIAGNOSIS — N95.0 POST-MENOPAUSAL BLEEDING: Primary | ICD-10-CM

## 2023-11-08 DIAGNOSIS — N95.0 POST-MENOPAUSAL BLEEDING: ICD-10-CM

## 2023-11-08 PROCEDURE — 3077F SYST BP >= 140 MM HG: CPT | Performed by: STUDENT IN AN ORGANIZED HEALTH CARE EDUCATION/TRAINING PROGRAM

## 2023-11-08 PROCEDURE — 1036F TOBACCO NON-USER: CPT | Performed by: STUDENT IN AN ORGANIZED HEALTH CARE EDUCATION/TRAINING PROGRAM

## 2023-11-08 PROCEDURE — 88175 CYTOPATH C/V AUTO FLUID REDO: CPT | Mod: TC,GCY | Performed by: STUDENT IN AN ORGANIZED HEALTH CARE EDUCATION/TRAINING PROGRAM

## 2023-11-08 PROCEDURE — 87624 HPV HI-RISK TYP POOLED RSLT: CPT | Performed by: STUDENT IN AN ORGANIZED HEALTH CARE EDUCATION/TRAINING PROGRAM

## 2023-11-08 PROCEDURE — 88175 CYTOPATH C/V AUTO FLUID REDO: CPT | Mod: TC | Performed by: STUDENT IN AN ORGANIZED HEALTH CARE EDUCATION/TRAINING PROGRAM

## 2023-11-08 PROCEDURE — 3079F DIAST BP 80-89 MM HG: CPT | Performed by: STUDENT IN AN ORGANIZED HEALTH CARE EDUCATION/TRAINING PROGRAM

## 2023-11-08 PROCEDURE — 99204 OFFICE O/P NEW MOD 45 MIN: CPT | Performed by: STUDENT IN AN ORGANIZED HEALTH CARE EDUCATION/TRAINING PROGRAM

## 2023-11-08 PROCEDURE — 99214 OFFICE O/P EST MOD 30 MIN: CPT | Performed by: STUDENT IN AN ORGANIZED HEALTH CARE EDUCATION/TRAINING PROGRAM

## 2023-11-08 RX ORDER — ALPRAZOLAM 1 MG/1
TABLET ORAL
Qty: 1 TABLET | Refills: 0 | Status: SHIPPED | OUTPATIENT
Start: 2023-11-08 | End: 2024-02-27 | Stop reason: WASHOUT

## 2023-11-08 RX ORDER — IBUPROFEN 800 MG/1
TABLET ORAL
Qty: 10 TABLET | Refills: 0 | Status: SHIPPED | OUTPATIENT
Start: 2023-11-08 | End: 2024-02-24 | Stop reason: HOSPADM

## 2023-11-08 RX ORDER — POLYETHYLENE GLYCOL 3350 17 G/17G
17 POWDER, FOR SOLUTION ORAL DAILY
Qty: 3 PACKET | Refills: 0 | Status: SHIPPED | OUTPATIENT
Start: 2023-11-08 | End: 2023-11-11

## 2023-11-08 RX ORDER — MISOPROSTOL 200 UG/1
TABLET ORAL
Qty: 1 TABLET | Refills: 0 | Status: SHIPPED | OUTPATIENT
Start: 2023-11-08 | End: 2024-02-24 | Stop reason: HOSPADM

## 2023-11-08 ASSESSMENT — PAIN SCALES - GENERAL: PAINLEVEL: 0-NO PAIN

## 2023-11-08 NOTE — TELEPHONE ENCOUNTER
Pt returned call to office.     Discussed need for in-office procedure.      11/20/23 date offered at 11:15am.     Pt desires to proceed.   Pt informed to arrive at 10:45am.   Preop meds explained.   Understanding voiced.     Pt aware to have someone drive her.

## 2023-11-08 NOTE — ASSESSMENT & PLAN NOTE
Pap smear collected today  Exam wnl  Recommend Hysteroscopy, D&C for polypectomy and endometrial sampling  Discussed ASC vs. In-office procedure in [place] [facility] - patient desires in-office  Discussed low risk of malignancy based on imaging findings but need to follow up to definitively rule out  Will contact [place] office to schedule

## 2023-11-08 NOTE — PROGRESS NOTES
Subjective   Patient ID: Juju Adams is a 63 y.o. female who presents for Vaginal Bleeding (Pt here for vaginal/rectal bleeding 1 week ago, pt states US showed uterine sac w/blood/Menopausal/Chaperone declined).    Seen in ED 11/2 for episode of blood per rectum and vagina with small clots. Today, reports no further bleeding. She has a remote history of fibroids.    I personally reviewed images from transvaginal US on 11/3/2023:     Pedunculated lesion in her uterus with feeding artery consistent with a polyp.    LMP 12 years ago. No history of bleeding disorder. No prior colonoscopy- she is scheduled for one 11/30/2023.         Objective   Physical Exam  Vitals reviewed. Exam conducted with a chaperone present.   Constitutional:       Appearance: Normal appearance.   HENT:      Head: Normocephalic.   Cardiovascular:      Rate and Rhythm: Normal rate and regular rhythm.   Pulmonary:      Effort: Pulmonary effort is normal. No respiratory distress.   Abdominal:      General: There is no distension.      Palpations: Abdomen is soft. There is no mass.      Tenderness: There is no abdominal tenderness. There is no guarding or rebound.   Genitourinary:     Comments: Normal appearing external female genitalia. Vulva without lesions. Vaginal mucosa normal appearing with physiologic discharge and no lesions. Cervix normal appearing without lesions. Nabothian cyst on anterior lip of cervix. No active bleeding.    No cervical motion tenderness. Uterus normal sized, mobile. Adnexa without masses or tenderness bilaterally.   Skin:     General: Skin is warm and dry.   Neurological:      General: No focal deficit present.      Mental Status: She is alert.   Psychiatric:         Mood and Affect: Mood normal.         Behavior: Behavior normal.         Thought Content: Thought content normal.         Judgment: Judgment normal.         Assessment/Plan   Problem List Items Addressed This Visit             ICD-10-CM     Post-menopausal bleeding - Primary N95.0     Pap smear collected today  Exam wnl  Recommend Hysteroscopy, D&C for polypectomy and endometrial sampling  Discussed ASC vs. In-office procedure in [facility] - patient desires in-office  Discussed low risk of malignancy based on imaging findings but need to follow up to definitively rule out  Will contact [place] office to schedule         Relevant Orders    THINPREP PAP    Slow transit constipation K59.01     Miralax Rx sent at patient request         Relevant Medications    polyethylene glycol (Glycolax, Miralax) 17 gram packet

## 2023-11-08 NOTE — TELEPHONE ENCOUNTER
----- Message from Hernán Qureshi MD sent at 11/8/2023  8:49 AM EST -----  Good morning!    [place] and she needs a hysteroscopy, polypectomy for post menopausal bleeding. She would like to complete this in office in [place]. Can you help me set this up?

## 2023-11-08 NOTE — PATIENT INSTRUCTIONS
"To schedule your mammogram:  please call [phone] to schedule your appointment    What is dilation and curettage?  This is a procedure to remove tissue from the inside of the uterus (figure 1). It is also called a \"D and C\" or \"D&C.\"    During a D&C, a doctor first opens, or \"dilates,\" the cervix. (The cervix is the bottom part, or the \"neck,\" of the uterus.) Then, they put a surgical tool called a \"curette\" through the vagina and cervix, and up into the uterus. They use the curette to scrape and remove tissue from the uterus.    A D&C is done in an operating room in a hospital or clinic.    Why might my doctor do a D&C?  Your doctor might do a D&C to figure out the cause of a symptom or problem. During a D&C, a doctor gets a sample of tissue from your uterus. Then, the sample can be checked for abnormal cells, cancer, or other problems.    You might have a D&C to:    ?Stop severe vaginal bleeding - For example, you might have a D&C if your period is too heavy.    ?Figure out the cause of abnormal bleeding - For example, you might have a D&C if you have very heavy periods, or if you have vaginal bleeding after going through menopause. This might help your doctor figure out what is causing the problem.    ?Get more information after an abnormal result from another test - For example, if you had a test to check for uterine cancer, your doctor might do a D&C to learn more.    Doctors can also do a D&C for other reasons. In pregnant or recently pregnant people, a doctor can do a D&C to:    ?Remove any pregnancy tissue that is left in the uterus after pregnancy loss - Pregnancy loss, or \"miscarriage,\" is when a pregnancy ends on its own.    ?Remove any pregnancy tissue that is left in the uterus after childbirth    ?Remove an abnormal growth called a \"molar pregnancy\" that has formed in the uterus    ?Do an  (end a pregnancy) during the first trimester    What should I do before a D&C?  Your doctor will " "give you instructions about what to do before a D&C. They will probably tell you not to eat or drink anything starting the night before the procedure.    Your doctor might give you medicine to put inside your vagina near your cervix the day before your D&C. The medicine can soften your cervix or start to dilate it.    Tell the doctor or nurse if you have any trouble getting ready for your D&C, or if you have questions about what to expect.    What happens during a D&C?  You will have a thin tube that goes into a vein, called an \"IV,\" put in your arm or hand. You will get fluids and medicines through the IV. Some of these medicines will make you feel relaxed, sleepy, or numb during the procedure.    When a doctor does a D&C to find out why a person is having symptoms, they might do another test called a \"hysteroscopy\" at the same time. During a hysteroscopy, the doctor puts a small camera inside the uterus to look for problems. If they find a growth in the uterus, they might do a procedure to remove it.    When a doctor does a D&C to treat a problem or condition, they will remove anything concerning that is inside the uterus. They might also scrape away some of the lining of the uterus.    What happens after a D&C?  After a D&C:    ?Your doctor or nurse will watch you for a while to make sure that you don't have any problems. This might take up to a few hours. When you are able to go home from the hospital, someone else should drive you.    ?You might have mild cramping and slight bleeding, or \"spotting,\" from the vagina. These can last for a few days. If you have pain, you can take a pain-relieving medicine.    ?Your doctor or nurse will tell you when you can start your usual activities again. They will also tell you when it is safe to have sex and put things, such as tampons, in your vagina.    ?Typically, you will get your period within 4 to 6 weeks after a D&C.    What are the risks of a D&C?  Your doctor will " talk to you about all of the possible risks, and answer your questions. Possible risks include:    ?Tear in the uterus    ?Injury to the cervix    ?Infection    ?Areas of scar tissue that form in the uterus    These risks are rare. But if any of these things happen, your doctor will let you know if any other treatments are needed.    When should I call the doctor?  Call your doctor or nurse if you have any of the following problems after your D&C:    ?Fever higher than 100.4°F (38°C)    ?Cramps that last more than 2 days    ?Pain that gets worse    ?Heavy vaginal bleeding, or vaginal bleeding that lasts more than 2 weeks    ?Vaginal discharge that is green or smells bad

## 2023-11-13 ENCOUNTER — APPOINTMENT (OUTPATIENT)
Dept: RADIOLOGY | Facility: HOSPITAL | Age: 63
End: 2023-11-13
Payer: COMMERCIAL

## 2023-11-13 ENCOUNTER — HOSPITAL ENCOUNTER (EMERGENCY)
Facility: HOSPITAL | Age: 63
Discharge: HOME | End: 2023-11-13
Payer: COMMERCIAL

## 2023-11-13 ENCOUNTER — HOSPITAL ENCOUNTER (EMERGENCY)
Facility: HOSPITAL | Age: 63
Discharge: HOME | End: 2023-11-14
Attending: EMERGENCY MEDICINE
Payer: COMMERCIAL

## 2023-11-13 VITALS
BODY MASS INDEX: 31.89 KG/M2 | WEIGHT: 180 LBS | SYSTOLIC BLOOD PRESSURE: 165 MMHG | DIASTOLIC BLOOD PRESSURE: 86 MMHG | HEIGHT: 63 IN | RESPIRATION RATE: 18 BRPM | OXYGEN SATURATION: 99 % | TEMPERATURE: 97.9 F | HEART RATE: 84 BPM

## 2023-11-13 DIAGNOSIS — A08.4 VIRAL GASTROENTERITIS: Primary | ICD-10-CM

## 2023-11-13 DIAGNOSIS — R07.89 OTHER CHEST PAIN: ICD-10-CM

## 2023-11-13 DIAGNOSIS — E86.0 DEHYDRATION: ICD-10-CM

## 2023-11-13 LAB
ALBUMIN SERPL-MCNC: 4 G/DL (ref 3.5–5)
ALP BLD-CCNC: 108 U/L (ref 35–125)
ALT SERPL-CCNC: 14 U/L (ref 5–40)
ANION GAP SERPL CALC-SCNC: 11 MMOL/L
APPEARANCE UR: CLEAR
AST SERPL-CCNC: 18 U/L (ref 5–40)
BASOPHILS # BLD AUTO: 0.03 X10*3/UL (ref 0–0.1)
BASOPHILS NFR BLD AUTO: 0.5 %
BILIRUB SERPL-MCNC: 0.3 MG/DL (ref 0.1–1.2)
BILIRUB UR STRIP.AUTO-MCNC: NEGATIVE MG/DL
BUN SERPL-MCNC: 8 MG/DL (ref 8–25)
CALCIUM SERPL-MCNC: 9.8 MG/DL (ref 8.5–10.4)
CHLORIDE SERPL-SCNC: 92 MMOL/L (ref 97–107)
CO2 SERPL-SCNC: 26 MMOL/L (ref 24–31)
COLOR UR: ABNORMAL
CREAT SERPL-MCNC: 0.7 MG/DL (ref 0.4–1.6)
EOSINOPHIL # BLD AUTO: 0.1 X10*3/UL (ref 0–0.7)
EOSINOPHIL NFR BLD AUTO: 1.7 %
ERYTHROCYTE [DISTWIDTH] IN BLOOD BY AUTOMATED COUNT: 12.5 % (ref 11.5–14.5)
GFR SERPL CREATININE-BSD FRML MDRD: >90 ML/MIN/1.73M*2
GLUCOSE SERPL-MCNC: 110 MG/DL (ref 65–99)
GLUCOSE UR STRIP.AUTO-MCNC: NORMAL MG/DL
HCT VFR BLD AUTO: 43.2 % (ref 36–46)
HGB BLD-MCNC: 14.1 G/DL (ref 12–16)
IMM GRANULOCYTES # BLD AUTO: 0.02 X10*3/UL (ref 0–0.7)
IMM GRANULOCYTES NFR BLD AUTO: 0.3 % (ref 0–0.9)
KETONES UR STRIP.AUTO-MCNC: ABNORMAL MG/DL
LEUKOCYTE ESTERASE UR QL STRIP.AUTO: NEGATIVE
LIPASE SERPL-CCNC: <16 U/L (ref 16–63)
LYMPHOCYTES # BLD AUTO: 0.98 X10*3/UL (ref 1.2–4.8)
LYMPHOCYTES NFR BLD AUTO: 17 %
MAGNESIUM SERPL-MCNC: 2 MG/DL (ref 1.6–3.1)
MCH RBC QN AUTO: 31 PG (ref 26–34)
MCHC RBC AUTO-ENTMCNC: 32.6 G/DL (ref 32–36)
MCV RBC AUTO: 95 FL (ref 80–100)
MONOCYTES # BLD AUTO: 0.55 X10*3/UL (ref 0.1–1)
MONOCYTES NFR BLD AUTO: 9.6 %
NEUTROPHILS # BLD AUTO: 4.07 X10*3/UL (ref 1.2–7.7)
NEUTROPHILS NFR BLD AUTO: 70.9 %
NITRITE UR QL STRIP.AUTO: NEGATIVE
NRBC BLD-RTO: 0 /100 WBCS (ref 0–0)
PH UR STRIP.AUTO: 6.5 [PH]
PLATELET # BLD AUTO: 223 X10*3/UL (ref 150–450)
POTASSIUM SERPL-SCNC: 4.2 MMOL/L (ref 3.4–5.1)
PROT SERPL-MCNC: 7.5 G/DL (ref 5.9–7.9)
PROT UR STRIP.AUTO-MCNC: NEGATIVE MG/DL
RBC # BLD AUTO: 4.55 X10*6/UL (ref 4–5.2)
RBC # UR STRIP.AUTO: NEGATIVE /UL
SODIUM SERPL-SCNC: 129 MMOL/L (ref 133–145)
SP GR UR STRIP.AUTO: 1.01
TROPONIN T SERPL-MCNC: <6 NG/L
UROBILINOGEN UR STRIP.AUTO-MCNC: NORMAL MG/DL
WBC # BLD AUTO: 5.8 X10*3/UL (ref 4.4–11.3)

## 2023-11-13 PROCEDURE — 81003 URINALYSIS AUTO W/O SCOPE: CPT | Performed by: EMERGENCY MEDICINE

## 2023-11-13 PROCEDURE — 99283 EMERGENCY DEPT VISIT LOW MDM: CPT

## 2023-11-13 PROCEDURE — 96374 THER/PROPH/DIAG INJ IV PUSH: CPT | Performed by: EMERGENCY MEDICINE

## 2023-11-13 PROCEDURE — 36415 COLL VENOUS BLD VENIPUNCTURE: CPT | Performed by: EMERGENCY MEDICINE

## 2023-11-13 PROCEDURE — 2500000004 HC RX 250 GENERAL PHARMACY W/ HCPCS (ALT 636 FOR OP/ED): Performed by: EMERGENCY MEDICINE

## 2023-11-13 PROCEDURE — 83735 ASSAY OF MAGNESIUM: CPT | Performed by: EMERGENCY MEDICINE

## 2023-11-13 PROCEDURE — 4500999001 HC ED NO CHARGE

## 2023-11-13 PROCEDURE — 99285 EMERGENCY DEPT VISIT HI MDM: CPT | Mod: 25 | Performed by: EMERGENCY MEDICINE

## 2023-11-13 PROCEDURE — 83690 ASSAY OF LIPASE: CPT | Performed by: EMERGENCY MEDICINE

## 2023-11-13 PROCEDURE — 84484 ASSAY OF TROPONIN QUANT: CPT | Performed by: EMERGENCY MEDICINE

## 2023-11-13 PROCEDURE — 96361 HYDRATE IV INFUSION ADD-ON: CPT | Performed by: EMERGENCY MEDICINE

## 2023-11-13 PROCEDURE — 71046 X-RAY EXAM CHEST 2 VIEWS: CPT | Mod: FY

## 2023-11-13 PROCEDURE — 99284 EMERGENCY DEPT VISIT MOD MDM: CPT | Mod: 25 | Performed by: EMERGENCY MEDICINE

## 2023-11-13 PROCEDURE — 85025 COMPLETE CBC W/AUTO DIFF WBC: CPT | Performed by: EMERGENCY MEDICINE

## 2023-11-13 PROCEDURE — 80053 COMPREHEN METABOLIC PANEL: CPT | Performed by: EMERGENCY MEDICINE

## 2023-11-13 RX ORDER — MORPHINE SULFATE 4 MG/ML
4 INJECTION, SOLUTION INTRAMUSCULAR; INTRAVENOUS ONCE
Status: DISCONTINUED | OUTPATIENT
Start: 2023-11-13 | End: 2023-11-14 | Stop reason: HOSPADM

## 2023-11-13 RX ORDER — ONDANSETRON HYDROCHLORIDE 2 MG/ML
4 INJECTION, SOLUTION INTRAVENOUS ONCE
Status: COMPLETED | OUTPATIENT
Start: 2023-11-13 | End: 2023-11-13

## 2023-11-13 RX ORDER — ONDANSETRON 4 MG/1
4 TABLET, ORALLY DISINTEGRATING ORAL EVERY 8 HOURS PRN
Qty: 30 TABLET | Refills: 0 | Status: SHIPPED | OUTPATIENT
Start: 2023-11-13 | End: 2024-02-27 | Stop reason: WASHOUT

## 2023-11-13 RX ADMIN — ONDANSETRON 4 MG: 2 INJECTION INTRAMUSCULAR; INTRAVENOUS at 21:19

## 2023-11-13 RX ADMIN — SODIUM CHLORIDE 1000 ML: 900 INJECTION, SOLUTION INTRAVENOUS at 21:20

## 2023-11-13 ASSESSMENT — HEART SCORE
HISTORY: SLIGHTLY SUSPICIOUS
AGE: 45-64
ECG: NORMAL
HISTORY: SLIGHTLY SUSPICIOUS
TROPONIN: LESS THAN OR EQUAL TO NORMAL LIMIT
RISK FACTORS: >2 RISK FACTORS OR HX OF ATHEROSCLEROTIC DISEASE
HEART SCORE: 3

## 2023-11-13 ASSESSMENT — COLUMBIA-SUICIDE SEVERITY RATING SCALE - C-SSRS
2. HAVE YOU ACTUALLY HAD ANY THOUGHTS OF KILLING YOURSELF?: NO
1. IN THE PAST MONTH, HAVE YOU WISHED YOU WERE DEAD OR WISHED YOU COULD GO TO SLEEP AND NOT WAKE UP?: NO
6. HAVE YOU EVER DONE ANYTHING, STARTED TO DO ANYTHING, OR PREPARED TO DO ANYTHING TO END YOUR LIFE?: NO
1. IN THE PAST MONTH, HAVE YOU WISHED YOU WERE DEAD OR WISHED YOU COULD GO TO SLEEP AND NOT WAKE UP?: NO
6. HAVE YOU EVER DONE ANYTHING, STARTED TO DO ANYTHING, OR PREPARED TO DO ANYTHING TO END YOUR LIFE?: NO
2. HAVE YOU ACTUALLY HAD ANY THOUGHTS OF KILLING YOURSELF?: NO

## 2023-11-13 ASSESSMENT — LIFESTYLE VARIABLES
REASON UNABLE TO ASSESS: NO
HAVE YOU EVER FELT YOU SHOULD CUT DOWN ON YOUR DRINKING: NO
HAVE YOU EVER FELT YOU SHOULD CUT DOWN ON YOUR DRINKING: NO
EVER FELT BAD OR GUILTY ABOUT YOUR DRINKING: NO
REASON UNABLE TO ASSESS: NO
EVER HAD A DRINK FIRST THING IN THE MORNING TO STEADY YOUR NERVES TO GET RID OF A HANGOVER: NO
EVER HAD A DRINK FIRST THING IN THE MORNING TO STEADY YOUR NERVES TO GET RID OF A HANGOVER: NO
HAVE PEOPLE ANNOYED YOU BY CRITICIZING YOUR DRINKING: NO
EVER FELT BAD OR GUILTY ABOUT YOUR DRINKING: NO
HAVE PEOPLE ANNOYED YOU BY CRITICIZING YOUR DRINKING: NO

## 2023-11-13 ASSESSMENT — PAIN SCALES - GENERAL: PAINLEVEL_OUTOF10: 7

## 2023-11-13 ASSESSMENT — PAIN DESCRIPTION - DESCRIPTORS: DESCRIPTORS: BURNING;CRAMPING

## 2023-11-13 ASSESSMENT — PAIN DESCRIPTION - LOCATION: LOCATION: LEG

## 2023-11-13 ASSESSMENT — PAIN - FUNCTIONAL ASSESSMENT: PAIN_FUNCTIONAL_ASSESSMENT: 0-10

## 2023-11-14 ENCOUNTER — HOSPITAL ENCOUNTER (EMERGENCY)
Facility: HOSPITAL | Age: 63
Discharge: STILL ADMITTED OR ADMINISTRATIVE | End: 2023-11-14
Payer: COMMERCIAL

## 2023-11-14 ENCOUNTER — HOSPITAL ENCOUNTER (OUTPATIENT)
Dept: CARDIOLOGY | Facility: HOSPITAL | Age: 63
Discharge: HOME | End: 2023-11-14
Payer: COMMERCIAL

## 2023-11-14 VITALS
DIASTOLIC BLOOD PRESSURE: 66 MMHG | TEMPERATURE: 97.5 F | RESPIRATION RATE: 11 BRPM | HEIGHT: 63 IN | SYSTOLIC BLOOD PRESSURE: 125 MMHG | OXYGEN SATURATION: 92 % | BODY MASS INDEX: 31.89 KG/M2 | HEART RATE: 75 BPM | WEIGHT: 180 LBS

## 2023-11-14 LAB
ATRIAL RATE: 76 BPM
HOLD SPECIMEN: NORMAL
P OFFSET: 196 MS
P ONSET: 142 MS
PR INTERVAL: 144 MS
Q ONSET: 214 MS
QRS COUNT: 13 BEATS
QRS DURATION: 88 MS
QT INTERVAL: 384 MS
QTC CALCULATION(BAZETT): 432 MS
QTC FREDERICIA: 415 MS
R AXIS: -27 DEGREES
T AXIS: 116 DEGREES
T OFFSET: 406 MS
TROPONIN T SERPL-MCNC: <6 NG/L
VENTRICULAR RATE: 76 BPM

## 2023-11-14 PROCEDURE — 93005 ELECTROCARDIOGRAM TRACING: CPT

## 2023-11-14 PROCEDURE — 4500999001 HC ED NO CHARGE

## 2023-11-14 NOTE — ED TRIAGE NOTES
Patient presents with leg cramping,, vomiting, and chest pain. Symptoms started this morning. Was sent home from work early. Went to main campus to be seen but left because she was placed in waiting area.

## 2023-11-14 NOTE — ED PROVIDER NOTES
Emergency Department Provider Note        MEDICAL DECISION [id number]     Chief Complaint   Patient presents with    leg cramping    Vomiting    Chest Pain      History/Exam limitations: none.   Additional history was obtained from patient.    HPI: Juju Adams  is a 63 y.o. presents with chief complaint of vomiting.  Also states legs been cramping.  Started earlier today, she states she was sent home from work.  Has been vomiting throughout the day occasional diarrhea.  No black or bloody stools.  No hematemesis.  Complains of midsternal chest pain that started earlier today.  Went to outside ER downtown and because she had there was a wait she left.  Denies shortness of breath.  Chest pain is nonradiating midsternal worse with coughing and vomiting.  Symptoms are constant, moderate, no other modifying factors.  Denies fevers or chills.  No abdominal pain.  Past medical records, Past medical history and surgical history reviewed and as documented.  History reviewed and as noted. past medical records reviewed.    Active Ambulatory Problems     Diagnosis Date Noted    Aftercare following joint replacement surgery 2022    Presence of left artificial knee joint 2022    Nausea in adult 10/02/2023    OA (osteoarthritis) of knee 10/02/2023    Primary osteoarthritis of left knee 2022    Obesity, Class I, BMI 30-34.9 10/02/2023    Renal mass 10/02/2023    Spinal stenosis of lumbar region without neurogenic claudication 10/02/2023    Mild intermittent asthma without complication 10/02/2023    Migraine without aura and without status migrainosus, not intractable 10/02/2023    Left nephrolithiasis 10/02/2023    Left flank pain 10/02/2023    Insect bite 10/02/2023    Hypertension 10/02/2023    Hyperlipidemia 10/02/2023    History of hip replacement, total, bilateral 10/02/2023    Hip joint replacement status 10/02/2023    Goiter 10/02/2023    Lumbago 10/02/2023    Chest pain 10/02/2023     Abscess of skin 10/02/2023    Back pain 10/02/2023    Valgus deformity, not elsewhere classified, unspecified site 05/20/2022    Unspecified asthma, uncomplicated 05/20/2022    Other abnormalities of gait and mobility 10/02/2023    Anemia 05/20/2022    Long term (current) use of inhaled steroids 05/20/2022    Bug bites 10/02/2023    Bumps on skin 10/02/2023    Presence of artificial hip joint, bilateral 05/20/2022    Personal history of nicotine dependence 05/20/2022    Long term current use of opiate analgesic 05/20/2022    History of falling 05/20/2022    Hyperlipidemia, unspecified 05/20/2022    Essential (primary) hypertension 05/20/2022    Kidney pain 10/02/2023    Post-menopausal bleeding 11/08/2023    Slow transit constipation 11/08/2023     Resolved Ambulatory Problems     Diagnosis Date Noted    No Resolved Ambulatory Problems     Past Medical History:   Diagnosis Date    Benign neoplasm of thyroid gland     Other specified cough 02/18/2019    Personal history of other diseases of the respiratory system 12/26/2017    Personal history of other diseases of the respiratory system 07/31/2018    Personal history of other drug therapy 11/01/2016    Personal history of other infectious and parasitic diseases 04/13/2015        Past Surgical History:   Procedure Laterality Date    TOTAL HIP ARTHROPLASTY  02/13/2014    Total Hip Replacement    TUBAL LIGATION  01/04/2018    Tubal Ligation        Family History   Problem Relation Name Age of Onset    Hypertension Mother      Heart attack Mother      Hypertension Father      Heart attack Father      Stroke Father      Diabetes Sister      Hypertension Sister      Seizures Sister          Social History     Tobacco Use    Smoking status: Former     Packs/day: 0.50     Years: 40.00     Additional pack years: 0.00     Total pack years: 20.00     Types: Cigarettes     Passive exposure: Past    Smokeless tobacco: Never   Vaping Use    Vaping Use: Never used   Substance Use  "Topics    Alcohol use: Not Currently    Drug use: Never          Unless otherwise stated in this report the patient's positive and negative responses for review of systems for constitutional, eyes, ENT, cardiovascular, respiratory, gastrointestinal, neurological, genitourinary, musculoskeletal, and integument systems and related systems to the presenting problem are either as stated in the HPI or were not pertinent or were negative for the symptoms and/or complaints related to the presenting medical problem.    PHYSICAL EXAM  Triage/nursing notes and vital signs reviewed as available and as noted above.     Visit Vitals  /75 (BP Location: Left arm, Patient Position: Sitting)   Pulse 100   Temp 36.4 °C (97.5 °F) (Temporal)   Resp 20   Ht 1.6 m (5' 3\")   Wt 81.6 kg (180 lb)   SpO2 95%   BMI 31.89 kg/m²   OB Status Perimenopausal   Smoking Status Former   BSA 1.9 m²        Vital Signs Reviewed and as noted above    General Appearance  Appears well  Alert  No Distress    Neurological  Alert and conversant   Appropriate orientation   Speech Clear   Moves all extremities spontaneously & symmetrically  No obvious Sensory loss to soft touch     Neck  Nml Inspection  No Kernig/Brudzinski's  Trachea midline    HEENT  Head Atraumatic  Eyes Nml Inspection  Hearing grossly normal  No Signs of Dehydration  Mucosa Moist    Respiratory  Respirations nonlabored  Symmetrical expansion  Lungs sounds clear    Cardiovascular  Rate Normal  Rhythm regular  Normal heart sounds  Pulses full,equal,   Brisk Capillary Refill    Abdomen/Pelvis  Bowel Sounds Present  Abdomen soft  Non-Tender  No distention    Genitourinary  [default value]    Extremity  Normal Appearance  Sensation Intact  Motor Intact  No Pedal Edema    Skin  Color Nml  Warm, Dry    Psychiatric  Follows commands  Cooperative, no apparent risk to self or others    ED COURSE  Current subjective and objective findings, differential diagnosis includes but not limited to viral " illness, Boerhaave/mediastinitis, ACS, pneumonia, pericardial effusion with or without pericarditis/myocarditis, small bowel obstruction, cholecystitis.      Work-up performed to evaluate for differential diagnosis as clinically indicated   Orders Placed This Encounter   Procedures    XR chest 2 views    CBC and Auto Differential    Magnesium    Comprehensive metabolic panel    Lipase    Urinalysis with Reflex Microscopic and Culture    Troponin T    Urinalysis with Reflex Microscopic and Culture    Extra Urine Gray Tube    Troponin T    ECG 12 lead          Labs and imaging reviewed by me  and note     12 Lead EKG: Performed at 9:07 PM  Reviewed and interpreted by me at time performed notes normal sinus rhythm at 76 bpm.  Sinus arrhythmia present.  CA intervals and QTc is normal.  Normal QRS.  No ST elevations or depressions.  No acute injury pattern.      Labs Reviewed   CBC WITH AUTO DIFFERENTIAL - Abnormal       Result Value    WBC 5.8      nRBC 0.0      RBC 4.55      Hemoglobin 14.1      Hematocrit 43.2      MCV 95      MCH 31.0      MCHC 32.6      RDW 12.5      Platelets 223      Neutrophils % 70.9      Immature Granulocytes %, Automated 0.3      Lymphocytes % 17.0      Monocytes % 9.6      Eosinophils % 1.7      Basophils % 0.5      Neutrophils Absolute 4.07      Immature Granulocytes Absolute, Automated 0.02      Lymphocytes Absolute 0.98 (*)     Monocytes Absolute 0.55      Eosinophils Absolute 0.10      Basophils Absolute 0.03     COMPREHENSIVE METABOLIC PANEL - Abnormal    Glucose 110 (*)     Sodium 129 (*)     Potassium 4.2      Chloride 92 (*)     Bicarbonate 26      Urea Nitrogen 8      Creatinine 0.70      eGFR >90      Calcium 9.8      Albumin 4.0      Alkaline Phosphatase 108      Total Protein 7.5      AST 18      Bilirubin, Total 0.3      ALT 14      Anion Gap 11     LIPASE - Abnormal    Lipase <16 (*)    MAGNESIUM - Normal    Magnesium 2.00     TROPONIN T, HIGH SENSITIVITY - Normal    Troponin T,  High Sensitivity <6     URINALYSIS WITH REFLEX MICROSCOPIC AND CULTURE    Narrative:     The following orders were created for panel order Urinalysis with Reflex Microscopic and Culture.  Procedure                               Abnormality         Status                     ---------                               -----------         ------                     Urinalysis with Reflex M...[[id number]]                                                 Extra Urine Gray Tube[[id number]]                                                         Please view results for these tests on the individual orders.   URINALYSIS WITH REFLEX MICROSCOPIC AND CULTURE   EXTRA URINE GRAY TUBE   TROPONIN T, HIGH SENSITIVITY        XR chest 2 views    (Results Pending)            Pt course which Intervention and treatment included :    Procedure  Procedures    Medications   ondansetron (Zofran) injection 4 mg (has no administration in time range)   sodium chloride 0.9 % bolus 1,000 mL (has no administration in time range)   morphine injection 4 mg (has no administration in time range)        ED Course as of 11/13/23 2206 Mon Nov 13, 2023 2200 Troponin T  Normal troponin [ML]   2200 Doubt PE as a cause of symptoms, other than age she has no other risk factors in addition chest pain is midsternal, he has no associated shortness of breath.  No other risk factors.  No evidence of dissection as she has symmetric pulses, not hypotensive and no history of such on previous imaging [ML]   2201 Labs sodium chloride complaint to low solute intake and possible dehydration contributing to his symptoms [ML]   2206 Patient symptoms improved, she is feeling better [ML]      ED Course User Index  [ML] Marty R Lejeune, DO         Diagnoses as of 11/13/23 2206   Viral gastroenteritis   Other chest pain   Dehydration    HEART Score: 3       DISPOSITION: Signed out from provider pending repeat troponin given heart score.  Signed out to Dr. Torres at 10:20 PM in  stable condition.      -------------------------------------------------------------------    11/13/23 at 9:07 PM - Marty R LeJeune, DO   Internal & Emergency Medicine          Marty R Lejeune, DO  Resident  11/13/23 8000

## 2023-11-14 NOTE — PROGRESS NOTES
Patient was received in signout at 12:14 AM.     IN BRIEF    63 y.o. presents with chief complaint of vomiting.  Also states legs been cramping.  Started earlier today, she states she was sent home from work.  Has been vomiting throughout the day occasional diarrhea.  No black or bloody stools.  No hematemesis.  Complains of midsternal chest pain that started earlier today.  Went to outside ER downtown and because she had there was a wait she left.  Denies shortness of breath.  Chest pain is nonradiating midsternal worse with coughing and vomiting.  Handoff from Dr. Lejeune with improvement of the patient's clinical picture and pending a repeat troponin.  Repeat troponin is still less than 6 and the patient will be discharged home.       ED COURSE     ED Course as of 11/14/23 0014 Mon Nov 13, 2023 2200 Troponin T  Normal troponin [ML]   2200 Doubt PE as a cause of symptoms, other than age she has no other risk factors in addition chest pain is midsternal, he has no associated shortness of breath.  No other risk factors.  No evidence of dissection as she has symmetric pulses, not hypotensive and no history of such on previous imaging [ML]   2201 Labs sodium chloride complaint to low solute intake and possible dehydration contributing to his symptoms [ML]   2206 Patient symptoms improved, she is feeling better [ML]      ED Course User Index  [ML] Marty R Lejeune, DO         Diagnoses as of 11/14/23 0014   Viral gastroenteritis   Other chest pain   Dehydration         FINAL IMPRESSION      1. Viral gastroenteritis    2. Other chest pain    3. Dehydration          DISPOSITION    Discharge 11/13/2023 09:59:00 PM

## 2023-11-14 NOTE — DISCHARGE INSTRUCTIONS
"Viral gastroenteritis in adults    The Basics  Written by the doctors and editors at [facility]  What is viral gastroenteritis? -- Viral gastroenteritis is an infection that can cause diarrhea and vomiting. It happens when a person's stomach and intestines get infected with a virus (figure 1). One of the most common causes of gastroenteritis is norovirus. But other viruses can cause it, too.  People can get viral gastroenteritis if they:  ?Touch an infected person or a surface with the virus on it, and then don't wash their hands  ?Eat foods or drink liquids with the virus in them. If people with the virus don't wash their hands, they can spread it to food or liquids they touch.  What are the symptoms of viral gastroenteritis? -- The infection causes diarrhea and vomiting. People can have either diarrhea or vomiting, or both. These symptoms usually start suddenly, and can be severe.  Viral gastroenteritis can also cause:  ?Fever  ?Headache or muscle aches  ?Belly pain or cramping  ?Loss of appetite  If you have a lot of diarrhea and vomiting, your body can lose too much water. This is known as \"dehydration.\" Dehydration can make you feel thirsty, tired, dizzy, or even confused. It can also make your urine look dark yellow.  Severe dehydration can be life-threatening. Older people are more likely to get severe dehydration.  Will I need tests? -- Not usually. Your doctor or nurse should be able to tell if you have viral gastroenteritis by learning about your symptoms and doing an exam. But the doctor or nurse might do tests to check for dehydration or to see which virus is causing the infection. These tests can include:  ?Blood tests  ?Urine tests  ?Tests on a sample of bowel movement  Is there anything I can do on my own to feel better? -- Yes. You need to replace the body's fluids that are lost through vomiting and diarrhea:  ?Drink fluids when you are able. It might help to take small sips every 15 to 30 minutes. Try " "to drink more as you start to feel better.  ?When you have a lot of vomiting or diarrhea, your body loses both water and salt. Drinking fluids that contain some salt can help replace what your body has lost. Examples include \"oral rehydration solutions,\" sports drinks, and broth. If you drink a lot of plain water, make sure you are also eating. This will help your body keep the right salt and water balance.  ?Avoid drinks with a lot of sugar, like juice or soda. Avoid alcohol, too.  ?Eat when you are able. If you can keep food down, it's best to eat lean meats, fruits, vegetables, and whole-grain breads and cereals. Avoid eating foods with a lot of fat or sugar, which can make symptoms worse.  If you are an adult younger than 65 and you have a new bout of diarrhea, and no fever and no blood in your bowel movements, you can take medicine to stop diarrhea such as loperamide (brand name: Imodium) for 1 to 2 days. But if you are older than 65, have a fever, or have blood in your bowel movements, do not take these medicines without checking with your doctor.  If you have diabetes, you might need to check your blood sugar more often until you feel better. Ask your doctor or nurse about this.  Should I call the doctor or nurse? -- Call the doctor or nurse if you:  ?Have any symptoms of dehydration, like feeling very tired, thirsty, dizzy, or confused  ?Have diarrhea or vomiting that lasts longer than a few days  ?Vomit up blood, have bloody diarrhea, or have severe belly pain  ?Haven't been able to drink anything for many hours  ?Haven't needed to urinate in the past 6 to 8 hours (during the day)  How is viral gastroenteritis treated? -- Most people do not need any treatment, because their symptoms will get better on their own. But people with severe dehydration might need treatment in the hospital. This involves getting fluids through a thin tube that goes into a vein, called an \"IV.\"  Doctors do not treat viral " gastroenteritis with antibiotics. That's because antibiotics treat infections that are caused by bacteria, not viruses.  Can viral gastroenteritis be prevented? -- Sometimes. To lower the chance of getting or spreading the infection, wash your hands well with soap and water:  ?After you use the bathroom  ?Before you eat  ?Before you prepare food  Also, if you are caring for a child in diapers, wash your hands well after changing diapers. Do not change diapers near where you cook or eat food.  Chest Pain    About this topic  Chest pain is felt in the upper part of your body from your neck to your belly. You may feel pain, pressure, or tightness. Many things can cause chest pain. Some are serious things like heart disease or lung disease. Less serious things like stomach problems can also cause chest pain.  The doctors may not be able to find all serious causes of chest pain the first time they see you. It is important that you follow up with your doctor. Treatment will depend on what is causing your chest pain.  What are the causes?  Some of the problems related to your heart include:  Heart attack. This is a blockage of blood supply to part of the heart.  Angina. This is similar to a heart attack, but without long-lasting heart damage.    Arrhythmia. This is abnormal heartbeats.  Pericarditis. This is irritation of the sac around the heart.  Some of the problems that may not be related to your heart include:  Digestive problems like ulcers, indigestion, gastric reflux, gallstones  Lung problems like collapsed lung, blood clots, asthma, pneumonia  Rib injuries or muscle pain  Panic attack  Pinched nerve  Shingles  What are the main signs?  Chest pain of any type should be checked by a doctor.  Signs that may show a more serious cause of chest pain are:  Squeezing or tightness in the chest which may spread to the back, neck, jaw, shoulders, or arms  Shortness of breath  Sweating  Dizziness  Upset stomach, nausea, or  throwing up  Weakness  Feeling faint  If you have chest pain with any of these signs, call for emergency help.  How does the doctor diagnose this health problem?  Your doctor will do an exam. The doctor may ask you questions about your pain. Your doctor may order:  Lab tests  Chest x-ray  Electrocardiogram (ECG)    Echocardiogram  Stress test  Nuclear heart scanning  Computed tomography (CT)  Heart cath or catheterization  How does the doctor treat this health problem?  Your treatment will depend on what is causing the pain. Many times, drugs may be given to treat the problem. Any chest pain could be serious. More serious problems can be treated by opening the vessel in your heart with a balloon or a metal straw called a stent. You may need surgery to replace the vessels in your heart. Always talk to your doctor about chest pain.  What lifestyle changes are needed?  Once your doctor finds the cause of your chest pain, you may be asked to make changes to your diet, activity, weight, and drugs you take. These changes will depend on the cause of your pain.  What drugs may be needed?  If chest pain is caused by a heart-related problem, the doctor may order drugs to:  Thin the blood  Dissolve a blood clot  Lower cholesterol  Lessen the work of your heart  Correct or prevent an abnormal heartbeat  Lower your blood pressure  Increase blood flow to the heart muscle  Relax the heart and help avoid spasms in the arteries  Check with your doctor before you take drugs like ibuprofen, naproxen, vitamins, or hormone replacement therapy.  If chest pain is caused by a something other than your heart, the doctor may order drugs to:  Help with pain  Treat stomach problems  Help with breathing  Help you relax  Control coughing  What problems could happen?  If the pain is due to a serious problem with the heart or lungs, the following things could happen:  Heart attack with long-lasting damage to the heart  Abnormal heartbeat that is  too fast, too slow, or irregular  The heart stops beating. This is cardiac arrest. If not promptly treated, it can result in death.  What can be done to prevent this health problem?  If you smoke, stop.  Keep a healthy weight. If you are too heavy, lose weight.  Keep blood pressure, cholesterol, and high blood sugar (diabetes) under control.  Be active. Walk, garden, or do something active for 30 minutes or more on most days of the week.  Eat lots of fiber, fruits, starches, and vegetables and stay away from foods that are high in fats.  When do I need to call the doctor?  Activate the emergency medical system right away if you have signs of a heart attack. Call 911 in the [place] or [place]. The sooner treatment begins, the better your chances for recovery. Call for emergency help right away if you have:  Signs of heart attack:  Chest pain  Trouble breathing  Fast heartbeat  Feeling dizzy  Not had chest pain before and it does not go away with rest after 5 minutes. Do not drive yourself to the hospital or have someone drive you. The emergency rescue people can begin to treat you the minute they arrive.    If you are to take nitroglycerin pills or spray with chest pain:  Rest. Sit or lie down.  Spray or place one pill under your tongue and let it melt.  If the pain is not better or is getting worse after 5 minutes, call for emergency help. Then take one more spray or pill under your tongue.  If the pain does not get better after another 3 to 5 minutes, take a third spray or pill under your tongue.  Drink a sip of water to wet your mouth if it is too dry to let the pills break down.

## 2023-11-20 ENCOUNTER — PROCEDURE VISIT (OUTPATIENT)
Dept: OBSTETRICS AND GYNECOLOGY | Facility: CLINIC | Age: 63
End: 2023-11-20
Payer: COMMERCIAL

## 2023-11-20 VITALS — HEIGHT: 65 IN | BODY MASS INDEX: 32.82 KG/M2 | WEIGHT: 197 LBS

## 2023-11-20 DIAGNOSIS — Z12.11 COLON CANCER SCREENING: ICD-10-CM

## 2023-11-20 DIAGNOSIS — N93.9 ABNORMAL UTERINE BLEEDING (AUB): ICD-10-CM

## 2023-11-20 PROCEDURE — 88305 TISSUE EXAM BY PATHOLOGIST: CPT

## 2023-11-20 PROCEDURE — 58558 HYSTEROSCOPY BIOPSY: CPT | Performed by: STUDENT IN AN ORGANIZED HEALTH CARE EDUCATION/TRAINING PROGRAM

## 2023-11-20 PROCEDURE — 88305 TISSUE EXAM BY PATHOLOGIST: CPT | Performed by: STUDENT IN AN ORGANIZED HEALTH CARE EDUCATION/TRAINING PROGRAM

## 2023-11-20 RX ORDER — POLYETHYLENE GLYCOL 3350, SODIUM SULFATE ANHYDROUS, SODIUM BICARBONATE, SODIUM CHLORIDE, POTASSIUM CHLORIDE 236; 22.74; 6.74; 5.86; 2.97 G/4L; G/4L; G/4L; G/4L; G/4L
4000 POWDER, FOR SOLUTION ORAL ONCE
Qty: 4000 ML | Refills: 0 | Status: SHIPPED | OUTPATIENT
Start: 2023-11-20 | End: 2023-11-20

## 2023-11-20 ASSESSMENT — PAIN SCALES - GENERAL: PAINLEVEL: 0-NO PAIN

## 2023-11-20 NOTE — PROGRESS NOTES
Patient ID: Juju Adams is a 63 y.o. female.    Hysteroscopy with polypectomy    Date/Time: 11/20/2023 11:01 AM    Performed by: Hernán Qureshi MD  Authorized by: Hernán Qureshi MD    Consent:     Consent obtained:  Written    Consent given by:  Patient    Risks, benefits, and alternatives were discussed: yes      Risks discussed:  Bleeding, infection and pain  Universal protocol:     Procedure explained and questions answered to patient or proxy's satisfaction: yes      Relevant documents present and verified: yes      Test results available: yes      Imaging studies available: yes      Required blood products, implants, devices, and special equipment available: yes      Immediately prior to procedure, a time out was called: yes      Patient identity confirmed:  Verbally with patient  Indications:     Indications:  Post menopausal bleeding, polyp on US  Pre-procedure details:     Skin preparation:  Povidone-iodine  Sedation:     Sedation type:  Anxiolysis  Anesthesia:     Anesthesia method:  Local infiltration    Local anesthetic:  Lidocaine 1% WITH epi  Procedure specific details:      Patient was placed in lithotomy position and speculum inserted. Cervix was cleansed with betadine swab x 3. Intracervical block with 12cc 1% lidocaine with epi injected into cervical stroma. Single tooth tenaculum used to grasp anterior cervix and cervix was gently dilated with size 13 [place] laurent dilator. 5mm hysteroscope was introduced under direct visualization using normal saline as distension medium. Polyp was clearly visualized on anterior endometrial wall. Bilateral tubal ostia visualized. Remainder of endometrial tissue appeared normal, smooth, atrophic without evidence of hyperplasia, irregular vascularity, fibroids or other polypoid tissue.    Resectr 5 Fr tissue resection device was introduced and suction activated. Polyp was resected completely and tissue collected and sent to pathology. Resection  device was removed, uterine cavity evacuated and hysteroscope removed. Tenaculum was removed and hemostasis of cervix noted. Speculum was removed and patient recovered per protocol.     The patient tolerated the procedure without immediate complication.   Post-procedure details:     Procedure completion:  Tolerated well, no immediate complications    Hernán Qureshi MD

## 2023-11-30 ENCOUNTER — OFFICE VISIT (OUTPATIENT)
Dept: GASTROENTEROLOGY | Facility: EXTERNAL LOCATION | Age: 63
End: 2023-11-30
Payer: COMMERCIAL

## 2023-11-30 DIAGNOSIS — K57.30 DIVERTICULOSIS OF LARGE INTESTINE WITHOUT DIVERTICULITIS: ICD-10-CM

## 2023-11-30 DIAGNOSIS — D12.3 BENIGN NEOPLASM OF TRANSVERSE COLON: ICD-10-CM

## 2023-11-30 DIAGNOSIS — Z12.11 COLON CANCER SCREENING: Primary | ICD-10-CM

## 2023-11-30 DIAGNOSIS — K92.1 HEMATOCHEZIA: ICD-10-CM

## 2023-11-30 DIAGNOSIS — K64.0 FIRST DEGREE HEMORRHOIDS: ICD-10-CM

## 2023-11-30 LAB
CYTOLOGY CMNT CVX/VAG CYTO-IMP: NORMAL
HPV HR 12 DNA GENITAL QL NAA+PROBE: NEGATIVE
HPV HR GENOTYPES PNL CVX NAA+PROBE: NEGATIVE
HPV16 DNA SPEC QL NAA+PROBE: NEGATIVE
HPV18 DNA SPEC QL NAA+PROBE: NEGATIVE
LAB AP HPV GENOTYPE QUESTION: YES
LAB AP HPV HR: NORMAL
LABORATORY COMMENT REPORT: NORMAL
MENSTRUAL HX REPORTED: NORMAL
PATH REPORT.RELEVANT HX SPEC: NORMAL
PATH REPORT.TOTAL CANCER: NORMAL

## 2023-11-30 PROCEDURE — 1036F TOBACCO NON-USER: CPT | Performed by: INTERNAL MEDICINE

## 2023-11-30 PROCEDURE — 88305 TISSUE EXAM BY PATHOLOGIST: CPT

## 2023-11-30 PROCEDURE — 88305 TISSUE EXAM BY PATHOLOGIST: CPT | Performed by: PATHOLOGY

## 2023-11-30 PROCEDURE — 45385 COLONOSCOPY W/LESION REMOVAL: CPT | Performed by: INTERNAL MEDICINE

## 2023-12-01 DIAGNOSIS — N72 CHRONIC CERVICITIS: Primary | ICD-10-CM

## 2023-12-01 RX ORDER — DOXYCYCLINE 100 MG/1
100 CAPSULE ORAL 2 TIMES DAILY
Qty: 14 CAPSULE | Refills: 0 | Status: SHIPPED | OUTPATIENT
Start: 2023-12-01 | End: 2023-12-08

## 2023-12-04 ENCOUNTER — LAB REQUISITION (OUTPATIENT)
Dept: LAB | Facility: HOSPITAL | Age: 63
End: 2023-12-04
Payer: COMMERCIAL

## 2023-12-06 LAB
LABORATORY COMMENT REPORT: NORMAL
PATH REPORT.FINAL DX SPEC: NORMAL
PATH REPORT.GROSS SPEC: NORMAL
PATH REPORT.RELEVANT HX SPEC: NORMAL
PATH REPORT.TOTAL CANCER: NORMAL

## 2024-02-12 ENCOUNTER — OFFICE VISIT (OUTPATIENT)
Dept: PRIMARY CARE | Facility: HOSPITAL | Age: 64
End: 2024-02-12
Payer: COMMERCIAL

## 2024-02-12 VITALS
DIASTOLIC BLOOD PRESSURE: 82 MMHG | HEIGHT: 63 IN | TEMPERATURE: 97.4 F | OXYGEN SATURATION: 98 % | BODY MASS INDEX: 35.61 KG/M2 | HEART RATE: 95 BPM | WEIGHT: 201 LBS | SYSTOLIC BLOOD PRESSURE: 138 MMHG

## 2024-02-12 DIAGNOSIS — R12 BURNING REFLUX: ICD-10-CM

## 2024-02-12 PROCEDURE — 1036F TOBACCO NON-USER: CPT

## 2024-02-12 PROCEDURE — 99213 OFFICE O/P EST LOW 20 MIN: CPT | Mod: GC

## 2024-02-12 PROCEDURE — 3075F SYST BP GE 130 - 139MM HG: CPT

## 2024-02-12 PROCEDURE — 99213 OFFICE O/P EST LOW 20 MIN: CPT

## 2024-02-12 PROCEDURE — 3079F DIAST BP 80-89 MM HG: CPT

## 2024-02-12 RX ORDER — OMEPRAZOLE 40 MG/1
40 CAPSULE, DELAYED RELEASE ORAL DAILY
Qty: 30 CAPSULE | Refills: 3 | Status: ON HOLD | OUTPATIENT
Start: 2024-02-12 | End: 2024-02-24 | Stop reason: SDUPTHER

## 2024-02-12 ASSESSMENT — ENCOUNTER SYMPTOMS
LOSS OF SENSATION IN FEET: 0
OCCASIONAL FEELINGS OF UNSTEADINESS: 0
DEPRESSION: 0

## 2024-02-12 ASSESSMENT — PATIENT HEALTH QUESTIONNAIRE - PHQ9
2. FEELING DOWN, DEPRESSED OR HOPELESS: NOT AT ALL
1. LITTLE INTEREST OR PLEASURE IN DOING THINGS: NOT AT ALL
SUM OF ALL RESPONSES TO PHQ9 QUESTIONS 1 & 2: 0

## 2024-02-12 ASSESSMENT — PAIN SCALES - GENERAL: PAINLEVEL: 10-WORST PAIN EVER

## 2024-02-12 NOTE — PROGRESS NOTES
Patient ID: Juju Adams is a 63 y.o. female.    Subjective    HPI  Ms Juju Adams is a 63 year old female with PMH of HTN, HLD, mild intermittent asthma, migraine headaches, renal cyst who presents to clinic for new complaints of acid reflux.    She describes a burning sensation in her chest that occurs primarily at night. She typically eats around 7PM and goes to sleep at 9:30. She has tried TUMS and Pepcid with no resolution of symptoms. She has had GERD about 10 years ago but this resolved with pepsid and she has not needed any medication for this in a while. She has been trying to lose weight with no success. She denies smoking and alcohol use.      Objective         Physical Exam  GEN:  A&Ox3, no acute distress, appears comfortable.  Conversational and appropriate.  No confusion or gross mental status changes.  EYES: EOMI, non-injected sclera.  PULM: no accessory muscle use  NEURO: Cranial nerves II-XII grossly intact.   PSYCH: Appropriate mood and behavior, converses and responds appropriately during exam      Assessment/Plan   63 year old female with PMH of HTN, HLD, mild intermittent asthma, migraine headaches, renal cyst who presents to clinic for new complaints of acid reflux.    #GERD  - start omeprazole 40 daily - counseled on appropriate usage  - stop aspirin 81mg  - referral to GI for consideration of EGD in setting of new onset GERD  - referral to dietitian for weight loss  - counseled on minimizing acidic food, eating close to bedtime    THE FOLLOWING WAS NOT DISCUSSED TODAY:     # Chest pains:  - Occasional substernal chest pains 1-2 times/year for past 2 years, relieved by nitroglycerine  - [facility] 3/2013: mild diffuse coronary disease, normal LV pressures  - Stress echo (3/2021): resting EF 60-65%, stress EF > 75%, normal stress echo  - Last EKG 2021: NSR, no abnormalities  Plan:  - Sublingual Nitroglycerine PRN     #HLD  -Last Lipid panel (3/2021): TChol 210, HDL 55, , TG  93  Plan:  -Ordered lipid panel  -Simvastatin 10 mg daily - plan to change to mod/high intensity statin pending lipid panel     #HTN  Plan:  -instructed to maintain daily BP log  - Continue HCTZ 25 mg, metop succ 50 mg     #Migraines  -Significantly improved, has 1 migraine every 1-2 months  Plan:  -Continue Sumatriptan 25 mg PRN, topiramate 100 BID PRN     #Health Maintenance:  -Mammo: last June 2022, normal. Ordered yearly screening mammogram  - CScope: Follow-up at next visit  - Pap: Follow-up at next visit  - Flu shot: Plan for next visit  -Ordered A1c and TSH      Prasad Saba MD

## 2024-02-12 NOTE — PATIENT INSTRUCTIONS
Dear Ms Adams,  Today we discussed your acid reflux. The plan is as follows:  Start taking omeprazole - take this 30 minutes before your first meal of the day.  Stop taking aspirin  Set up an appointment with Gastroenterology to consider a scope of your esophagus - I have made a referral  Set up an appointment to see a dietitian to assist with weight loss.

## 2024-02-14 NOTE — PROGRESS NOTES
I saw and evaluated the patient. I personally obtained the key and critical portions of the history and physical exam or was physically present for key and critical portions performed by the resident/fellow. I reviewed the resident/fellow's documentation and discussed the patient with the resident/fellow. I agree with the resident/fellow's medical decision making as documented in the note.    David Cox MD

## 2024-02-21 ENCOUNTER — CLINICAL SUPPORT (OUTPATIENT)
Dept: EMERGENCY MEDICINE | Facility: HOSPITAL | Age: 64
End: 2024-02-21
Payer: COMMERCIAL

## 2024-02-21 ENCOUNTER — APPOINTMENT (OUTPATIENT)
Dept: RADIOLOGY | Facility: HOSPITAL | Age: 64
End: 2024-02-21
Payer: COMMERCIAL

## 2024-02-21 ENCOUNTER — HOSPITAL ENCOUNTER (OUTPATIENT)
Facility: HOSPITAL | Age: 64
Setting detail: OBSERVATION
Discharge: HOME | End: 2024-02-24
Attending: EMERGENCY MEDICINE | Admitting: INTERNAL MEDICINE
Payer: COMMERCIAL

## 2024-02-21 DIAGNOSIS — R12 BURNING REFLUX: ICD-10-CM

## 2024-02-21 DIAGNOSIS — R07.9 CHEST PAIN IN ADULT: ICD-10-CM

## 2024-02-21 DIAGNOSIS — R07.9 CHEST PAIN, UNSPECIFIED TYPE: Primary | ICD-10-CM

## 2024-02-21 DIAGNOSIS — R07.89 OTHER CHEST PAIN: ICD-10-CM

## 2024-02-21 LAB
ALBUMIN SERPL BCP-MCNC: 4.1 G/DL (ref 3.4–5)
ALP SERPL-CCNC: 72 U/L (ref 33–136)
ALT SERPL W P-5'-P-CCNC: 12 U/L (ref 7–45)
ANION GAP SERPL CALC-SCNC: 10 MMOL/L (ref 10–20)
AST SERPL W P-5'-P-CCNC: 13 U/L (ref 9–39)
ATRIAL RATE: 92 BPM
BASOPHILS # BLD AUTO: 0.04 X10*3/UL (ref 0–0.1)
BASOPHILS NFR BLD AUTO: 0.8 %
BILIRUB SERPL-MCNC: 0.5 MG/DL (ref 0–1.2)
BNP SERPL-MCNC: 3 PG/ML (ref 0–99)
BUN SERPL-MCNC: 14 MG/DL (ref 6–23)
CALCIUM SERPL-MCNC: 9.6 MG/DL (ref 8.6–10.6)
CARDIAC TROPONIN I PNL SERPL HS: <3 NG/L (ref 0–34)
CARDIAC TROPONIN I PNL SERPL HS: <3 NG/L (ref 0–34)
CHLORIDE SERPL-SCNC: 103 MMOL/L (ref 98–107)
CO2 SERPL-SCNC: 29 MMOL/L (ref 21–32)
CREAT SERPL-MCNC: 0.77 MG/DL (ref 0.5–1.05)
EGFRCR SERPLBLD CKD-EPI 2021: 86 ML/MIN/1.73M*2
EOSINOPHIL # BLD AUTO: 0.12 X10*3/UL (ref 0–0.7)
EOSINOPHIL NFR BLD AUTO: 2.4 %
ERYTHROCYTE [DISTWIDTH] IN BLOOD BY AUTOMATED COUNT: 13.1 % (ref 11.5–14.5)
GLUCOSE SERPL-MCNC: 98 MG/DL (ref 74–99)
HCT VFR BLD AUTO: 42.9 % (ref 36–46)
HGB BLD-MCNC: 14.7 G/DL (ref 12–16)
IMM GRANULOCYTES # BLD AUTO: 0.01 X10*3/UL (ref 0–0.7)
IMM GRANULOCYTES NFR BLD AUTO: 0.2 % (ref 0–0.9)
LYMPHOCYTES # BLD AUTO: 1.94 X10*3/UL (ref 1.2–4.8)
LYMPHOCYTES NFR BLD AUTO: 38.2 %
MAGNESIUM SERPL-MCNC: 1.85 MG/DL (ref 1.6–2.4)
MCH RBC QN AUTO: 31.3 PG (ref 26–34)
MCHC RBC AUTO-ENTMCNC: 34.3 G/DL (ref 32–36)
MCV RBC AUTO: 91 FL (ref 80–100)
MONOCYTES # BLD AUTO: 0.38 X10*3/UL (ref 0.1–1)
MONOCYTES NFR BLD AUTO: 7.5 %
NEUTROPHILS # BLD AUTO: 2.59 X10*3/UL (ref 1.2–7.7)
NEUTROPHILS NFR BLD AUTO: 50.9 %
NRBC BLD-RTO: 0 /100 WBCS (ref 0–0)
P AXIS: 77 DEGREES
P OFFSET: 203 MS
P ONSET: 141 MS
PLATELET # BLD AUTO: 215 X10*3/UL (ref 150–450)
POTASSIUM SERPL-SCNC: 3.8 MMOL/L (ref 3.5–5.3)
PR INTERVAL: 164 MS
PROT SERPL-MCNC: 6.9 G/DL (ref 6.4–8.2)
Q ONSET: 223 MS
QRS COUNT: 15 BEATS
QRS DURATION: 80 MS
QT INTERVAL: 376 MS
QTC CALCULATION(BAZETT): 464 MS
QTC FREDERICIA: 433 MS
R AXIS: 34 DEGREES
RBC # BLD AUTO: 4.7 X10*6/UL (ref 4–5.2)
SODIUM SERPL-SCNC: 138 MMOL/L (ref 136–145)
T AXIS: 68 DEGREES
T OFFSET: 411 MS
VENTRICULAR RATE: 92 BPM
WBC # BLD AUTO: 5.1 X10*3/UL (ref 4.4–11.3)

## 2024-02-21 PROCEDURE — 80053 COMPREHEN METABOLIC PANEL: CPT

## 2024-02-21 PROCEDURE — 83735 ASSAY OF MAGNESIUM: CPT

## 2024-02-21 PROCEDURE — 94640 AIRWAY INHALATION TREATMENT: CPT

## 2024-02-21 PROCEDURE — 96374 THER/PROPH/DIAG INJ IV PUSH: CPT | Performed by: INTERNAL MEDICINE

## 2024-02-21 PROCEDURE — 2500000001 HC RX 250 WO HCPCS SELF ADMINISTERED DRUGS (ALT 637 FOR MEDICARE OP): Performed by: PHYSICIAN ASSISTANT

## 2024-02-21 PROCEDURE — 71046 X-RAY EXAM CHEST 2 VIEWS: CPT | Performed by: RADIOLOGY

## 2024-02-21 PROCEDURE — G0378 HOSPITAL OBSERVATION PER HR: HCPCS

## 2024-02-21 PROCEDURE — 93005 ELECTROCARDIOGRAM TRACING: CPT

## 2024-02-21 PROCEDURE — 36415 COLL VENOUS BLD VENIPUNCTURE: CPT

## 2024-02-21 PROCEDURE — 84484 ASSAY OF TROPONIN QUANT: CPT

## 2024-02-21 PROCEDURE — 96375 TX/PRO/DX INJ NEW DRUG ADDON: CPT | Performed by: INTERNAL MEDICINE

## 2024-02-21 PROCEDURE — 71046 X-RAY EXAM CHEST 2 VIEWS: CPT

## 2024-02-21 PROCEDURE — 2500000004 HC RX 250 GENERAL PHARMACY W/ HCPCS (ALT 636 FOR OP/ED)

## 2024-02-21 PROCEDURE — 2500000002 HC RX 250 W HCPCS SELF ADMINISTERED DRUGS (ALT 637 FOR MEDICARE OP, ALT 636 FOR OP/ED)

## 2024-02-21 PROCEDURE — 99285 EMERGENCY DEPT VISIT HI MDM: CPT | Mod: 25

## 2024-02-21 PROCEDURE — 83880 ASSAY OF NATRIURETIC PEPTIDE: CPT

## 2024-02-21 PROCEDURE — 96376 TX/PRO/DX INJ SAME DRUG ADON: CPT | Performed by: INTERNAL MEDICINE

## 2024-02-21 PROCEDURE — 93010 ELECTROCARDIOGRAM REPORT: CPT | Performed by: EMERGENCY MEDICINE

## 2024-02-21 PROCEDURE — 85025 COMPLETE CBC W/AUTO DIFF WBC: CPT

## 2024-02-21 PROCEDURE — 2500000004 HC RX 250 GENERAL PHARMACY W/ HCPCS (ALT 636 FOR OP/ED): Performed by: PHYSICIAN ASSISTANT

## 2024-02-21 PROCEDURE — 2500000002 HC RX 250 W HCPCS SELF ADMINISTERED DRUGS (ALT 637 FOR MEDICARE OP, ALT 636 FOR OP/ED): Performed by: PHYSICIAN ASSISTANT

## 2024-02-21 PROCEDURE — 99285 EMERGENCY DEPT VISIT HI MDM: CPT | Performed by: EMERGENCY MEDICINE

## 2024-02-21 RX ORDER — METOPROLOL TARTRATE 1 MG/ML
5 INJECTION, SOLUTION INTRAVENOUS ONCE AS NEEDED
Status: DISCONTINUED | OUTPATIENT
Start: 2024-02-21 | End: 2024-02-22

## 2024-02-21 RX ORDER — SIMVASTATIN 20 MG/1
10 TABLET, FILM COATED ORAL NIGHTLY
Status: DISCONTINUED | OUTPATIENT
Start: 2024-02-21 | End: 2024-02-23

## 2024-02-21 RX ORDER — NITROGLYCERIN 0.4 MG/1
0.8 TABLET SUBLINGUAL ONCE
Status: COMPLETED | OUTPATIENT
Start: 2024-02-21 | End: 2024-02-22

## 2024-02-21 RX ORDER — ALBUTEROL SULFATE 90 UG/1
2 AEROSOL, METERED RESPIRATORY (INHALATION) EVERY 4 HOURS PRN
Status: DISCONTINUED | OUTPATIENT
Start: 2024-02-21 | End: 2024-02-24 | Stop reason: HOSPADM

## 2024-02-21 RX ORDER — ACETAMINOPHEN 325 MG/1
975 TABLET ORAL ONCE
Status: COMPLETED | OUTPATIENT
Start: 2024-02-21 | End: 2024-02-21

## 2024-02-21 RX ORDER — LORAZEPAM 2 MG/ML
0.5 INJECTION INTRAMUSCULAR EVERY 5 MIN PRN
Status: DISCONTINUED | OUTPATIENT
Start: 2024-02-21 | End: 2024-02-22

## 2024-02-21 RX ORDER — METOPROLOL SUCCINATE 50 MG/1
50 TABLET, EXTENDED RELEASE ORAL DAILY
Status: DISCONTINUED | OUTPATIENT
Start: 2024-02-21 | End: 2024-02-23

## 2024-02-21 RX ORDER — HYDROCHLOROTHIAZIDE 25 MG/1
25 TABLET ORAL DAILY
Status: DISCONTINUED | OUTPATIENT
Start: 2024-02-21 | End: 2024-02-23

## 2024-02-21 RX ORDER — METOPROLOL TARTRATE 100 MG/1
100 TABLET ORAL ONCE AS NEEDED
Status: COMPLETED | OUTPATIENT
Start: 2024-02-21 | End: 2024-02-22

## 2024-02-21 RX ORDER — FAMOTIDINE 10 MG/ML
20 INJECTION INTRAVENOUS EVERY 12 HOURS SCHEDULED
Status: DISCONTINUED | OUTPATIENT
Start: 2024-02-21 | End: 2024-02-23

## 2024-02-21 RX ORDER — METOPROLOL TARTRATE 1 MG/ML
5 INJECTION, SOLUTION INTRAVENOUS ONCE
Status: DISCONTINUED | OUTPATIENT
Start: 2024-02-21 | End: 2024-02-22

## 2024-02-21 RX ORDER — METOPROLOL TARTRATE 100 MG/1
100 TABLET ORAL ONCE
Status: COMPLETED | OUTPATIENT
Start: 2024-02-21 | End: 2024-02-22

## 2024-02-21 RX ORDER — MORPHINE SULFATE 4 MG/ML
4 INJECTION INTRAVENOUS ONCE
Status: COMPLETED | OUTPATIENT
Start: 2024-02-21 | End: 2024-02-21

## 2024-02-21 RX ORDER — ONDANSETRON HYDROCHLORIDE 2 MG/ML
4 INJECTION, SOLUTION INTRAVENOUS EVERY 8 HOURS PRN
Status: DISCONTINUED | OUTPATIENT
Start: 2024-02-21 | End: 2024-02-22

## 2024-02-21 RX ADMIN — ACETAMINOPHEN 975 MG: 325 TABLET ORAL at 14:05

## 2024-02-21 RX ADMIN — ALBUTEROL SULFATE 2 PUFF: 90 AEROSOL, METERED RESPIRATORY (INHALATION) at 20:22

## 2024-02-21 RX ADMIN — HYDROCHLOROTHIAZIDE 25 MG: 25 TABLET ORAL at 09:12

## 2024-02-21 RX ADMIN — SIMVASTATIN 10 MG: 20 TABLET, FILM COATED ORAL at 21:45

## 2024-02-21 RX ADMIN — FAMOTIDINE 20 MG: 10 INJECTION INTRAVENOUS at 09:24

## 2024-02-21 RX ADMIN — FAMOTIDINE 20 MG: 10 INJECTION INTRAVENOUS at 20:22

## 2024-02-21 RX ADMIN — METOPROLOL SUCCINATE 50 MG: 50 TABLET, EXTENDED RELEASE ORAL at 09:09

## 2024-02-21 RX ADMIN — MORPHINE SULFATE 4 MG: 4 INJECTION INTRAVENOUS at 04:38

## 2024-02-21 ASSESSMENT — HEART SCORE
RISK FACTORS: 1-2 RISK FACTORS
TROPONIN: LESS THAN OR EQUAL TO NORMAL LIMIT
ECG: NON-SPECIFIC REPOLARIZATION DISTURBANCE
AGE: 45-64
HISTORY: MODERATELY SUSPICIOUS
HEART SCORE: 4

## 2024-02-21 ASSESSMENT — LIFESTYLE VARIABLES
HAVE PEOPLE ANNOYED YOU BY CRITICIZING YOUR DRINKING: NO
EVER HAD A DRINK FIRST THING IN THE MORNING TO STEADY YOUR NERVES TO GET RID OF A HANGOVER: NO
EVER FELT BAD OR GUILTY ABOUT YOUR DRINKING: NO
HAVE YOU EVER FELT YOU SHOULD CUT DOWN ON YOUR DRINKING: NO

## 2024-02-21 ASSESSMENT — PAIN SCALES - GENERAL
PAINLEVEL_OUTOF10: 0 - NO PAIN
PAINLEVEL_OUTOF10: 8
PAINLEVEL_OUTOF10: 0 - NO PAIN
PAINLEVEL_OUTOF10: 0 - NO PAIN
PAINLEVEL_OUTOF10: 8
PAINLEVEL_OUTOF10: 8

## 2024-02-21 ASSESSMENT — PAIN DESCRIPTION - ORIENTATION
ORIENTATION: MID
ORIENTATION: MID

## 2024-02-21 ASSESSMENT — PAIN - FUNCTIONAL ASSESSMENT
PAIN_FUNCTIONAL_ASSESSMENT: 0-10
PAIN_FUNCTIONAL_ASSESSMENT: 0-10

## 2024-02-21 ASSESSMENT — PAIN DESCRIPTION - LOCATION
LOCATION: CHEST
LOCATION: CHEST

## 2024-02-21 ASSESSMENT — COLUMBIA-SUICIDE SEVERITY RATING SCALE - C-SSRS
6. HAVE YOU EVER DONE ANYTHING, STARTED TO DO ANYTHING, OR PREPARED TO DO ANYTHING TO END YOUR LIFE?: NO
2. HAVE YOU ACTUALLY HAD ANY THOUGHTS OF KILLING YOURSELF?: NO
1. IN THE PAST MONTH, HAVE YOU WISHED YOU WERE DEAD OR WISHED YOU COULD GO TO SLEEP AND NOT WAKE UP?: NO

## 2024-02-21 ASSESSMENT — PAIN DESCRIPTION - DESCRIPTORS: DESCRIPTORS: ACHING

## 2024-02-21 NOTE — ED PROVIDER NOTES
"History and Physical  [facility] CLINICAL DECISION  Patient: Juju Adams  MRN: [id number]  : 1960  Date of Evaluation: 2024  ED Provider: Nathanael Acosta PA-C      Limitations to history: None  Independent Historian: Yes  External Records Reviewed: Recent and relevant inpatient and outpatient notes in EMR      Patient History:  Juju Adams is a 63 y.o. female with past medical history of hypertension, HLD, former smoker and recent diagnosis of GERD with a significant family history of cardiac disease who is admitted to the Clinical Decision Unit for chest pain.  Patient reports an acute onset of midsternal chest pain that awoke her from sleep at approximately 2:45 AM this morning.  Chest pain has been constant since the onset.  Chest pain is nonradiating.  Patient states that she did become diaphoretic en route to the ED.  She endorsed nausea and feeling of \"abdominal bloating\".  She reports worsening shortness of breath with exertion.  She denies headache syncope dizziness visual complaints dysphagia back pain vomiting fever chills urinary symptoms or any extremity pain.    The acute evaluation included:  Orders Placed This Encounter   Procedures    XR chest 2 views    Point of Care Ultrasound    Troponin I Series, High Sensitivity (0, 1 HR)    CBC and Auto Differential    Comprehensive Metabolic Panel    Magnesium    Troponin I, High Sensitivity, Initial    Troponin, High Sensitivity, 1 Hour    B-Type Natriuretic Peptide    Vital Signs    Cardiac Monitoring - ED/ICU/PACU Only    ECG 12 lead    ECG 12 lead    Insert and maintain peripheral IV    Send to CDU       I reviewed the below labs and imaging as ordered by the ED provider:  XR chest 2 views   Final Result   1.  Findings suggestive of emphysema or  D. No focal consolidation.                  MACRO:   None        Signed by: Sunshine Chambers 2024 5:00 AM   Dictation workstation:   [id number]      Point of " Care Ultrasound    (Results Pending)       Labs Reviewed   COMPREHENSIVE METABOLIC PANEL - Normal       Result Value    Glucose 98      Sodium 138      Potassium 3.8      Chloride 103      Bicarbonate 29      Anion Gap 10      Urea Nitrogen 14      Creatinine 0.77      eGFR 86      Calcium 9.6      Albumin 4.1      Alkaline Phosphatase 72      Total Protein 6.9      AST 13      Bilirubin, Total 0.5      ALT 12     MAGNESIUM - Normal    Magnesium 1.85     SERIAL TROPONIN-INITIAL - Normal    Troponin I, High Sensitivity <3      Narrative:     Less than 99th percentile of normal range cutoff-  Female and children under 18 years old <35 ng/L; Male <54 ng/L: Negative  Repeat testing should be performed if clinically indicated.     Female and children under 18 years old  ng/L; Male  ng/L:  Consistent with possible cardiac damage and possible increased clinical   risk. Serial measurements may help to assess extent of myocardial damage.     >120 ng/L: Consistent with cardiac damage, increased clinical risk and  myocardial infarction. Serial measurements may help assess extent of   myocardial damage.      NOTE: Children less than 1 year old may have higher baseline troponin   levels and results should be interpreted in conjunction with the overall   clinical context.    NOTE: Troponin I testing is performed using a different   testing methodology at [facility] than at other   [facility]. Direct result comparisons should only   be made within the same method.     SERIAL TROPONIN, 1 HOUR - Normal    Troponin I, High Sensitivity <3      Narrative:     Less than 99th percentile of normal range cutoff-  Female and children under 18 years old <35 ng/L; Male <54 ng/L: Negative  Repeat testing should be performed if clinically indicated.     Female and children under 18 years old  ng/L; Male  ng/L:  Consistent with possible cardiac damage and possible increased clinical   risk. Serial  measurements may help to assess extent of myocardial damage.     >120 ng/L: Consistent with cardiac damage, increased clinical risk and  myocardial infarction. Serial measurements may help assess extent of   myocardial damage.      NOTE: Children less than 1 year old may have higher baseline troponin   levels and results should be interpreted in conjunction with the overall   clinical context.    NOTE: Troponin I testing is performed using a different   testing methodology at [facility] than at other   [facility]. Direct result comparisons should only   be made within the same method.     B-TYPE NATRIURETIC PEPTIDE - Normal    BNP 3      Narrative:        <100 pg/mL - Heart failure unlikely  100-299 pg/mL - Intermediate probability of acute heart                  failure exacerbation. Correlate with clinical                  context and patient history.    >=300 pg/mL - Heart Failure likely. Correlate with clinical                  context and patient history.     Biotin interference may cause falsely decreased results. Patients taking a Biotin dose of up to 5 mg/day should refrain from taking Biotin for 24 hours before sample  collection. Providers may contact their local laboratory for further information.   TROPONIN SERIES- (INITIAL, 1 HR)    Narrative:     The following orders were created for panel order Troponin I Series, High Sensitivity (0, 1 HR).  Procedure                               Abnormality         Status                     ---------                               -----------         ------                     Troponin I, High Sensiti...[[id number]]  Normal              Final result               Troponin, High Sensitivi...[[id number]]  Normal              Final result                 Please view results for these tests on the individual orders.   CBC WITH AUTO DIFFERENTIAL    WBC 5.1      nRBC 0.0      RBC 4.70      Hemoglobin 14.7      Hematocrit 42.9      MCV 91      MCH 31.3       MCHC 34.3      RDW 13.1      Platelets 215      Neutrophils % 50.9      Immature Granulocytes %, Automated 0.2      Lymphocytes % 38.2      Monocytes % 7.5      Eosinophils % 2.4      Basophils % 0.8      Neutrophils Absolute 2.59      Immature Granulocytes Absolute, Automated 0.01      Lymphocytes Absolute 1.94      Monocytes Absolute 0.38      Eosinophils Absolute 0.12      Basophils Absolute 0.04             After discussion with the ED provider, a decision was made to admit the patient to the Clinical Decision Unit.    Upon admission to the Clinical Decision Unit, Mrs. Adams is alert and oriented x 4 and appears in no acute distress.  Medical workup initiated in the emergency department included EKG, laboratory studies and chest x-ray.  Diagnostic information was obtained, reviewed and discussed with the patient.  EKG was without acute ischemic changes or arrhythmias.  High-sensitivity serial troponins were negative x 3.  BNP was within normal limits.  No significant leukocytosis, electrolyte derangement or anemia.  Chest x-ray with findings which could represent emphysema/COPD.  In the ED patient received morphine with improvement in her chest pain symptoms.  Patient appears to be a bit anxious.  Vital signs are stable.  Mrs. Adams was placed into the clinical decision unit for close observation, continuous telemetry and provocative cardiac testing.    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    Other specified cough 02/18/2019    Post-viral cough syndrome    Personal history of other diseases of the respiratory system 12/26/2017    History of acute bronchitis with bronchospasm    Personal history of other diseases of the respiratory system 07/31/2018    History of acute sinusitis    Personal history of other drug therapy 11/01/2016    History of influenza vaccination    Personal history of other infectious and  parasitic diseases 04/13/2015    History of herpes zoster     Past Surgical History:   Procedure Laterality Date    TOTAL HIP ARTHROPLASTY  02/13/2014    Total Hip Replacement    TUBAL LIGATION  01/04/2018    Tubal Ligation     Social History     Socioeconomic History    Marital status: Single     Spouse name: Not on file    Number of children: Not on file    Years of education: Not on file    Highest education level: Not on file   Occupational History    Not on file   Tobacco Use    Smoking status: Former     Packs/day: 0.50     Years: 40.00     Additional pack years: 0.00     Total pack years: 20.00     Types: Cigarettes     Passive exposure: Past    Smokeless tobacco: Never   Vaping Use    Vaping Use: Never used   Substance and Sexual Activity    Alcohol use: Not Currently    Drug use: Never    Sexual activity: Not on file   Other Topics Concern    Not on file   Social History Narrative    Not on file     Social Determinants of Health     Financial Resource Strain: Not on file   Food Insecurity: No Food Insecurity (11/7/2023)    Hunger Vital Sign     Worried About Running Out of Food in the Last Year: Never true     Ran Out of Food in the Last Year: Never true   Transportation Needs: Not on file   Physical Activity: Not on file   Stress: Not on file   Social Connections: Not on file   Intimate Partner Violence: Not on file   Housing Stability: Not on file         Medications/Allergies     Previous Medications    ACETAMINOPHEN (TYLENOL) 325 MG TABLET    Take 1 tablet (325 mg) by mouth every 4 hours if needed.    ACETAMINOPHEN (TYLENOL) 500 MG TABLET    Take 1 tablet (500 mg) by mouth every 4 hours if needed.    ALBUTEROL 90 MCG/ACTUATION INHALER    Inhale 2 puffs every 4 hours if needed.    ALPRAZOLAM (XANAX) 1 MG TABLET    To be given by staff 30 minutes prior to procedure    ASCORBIC ACID, VITAMIN C, 500 MG CAPSULE    Take by mouth.    ASPIRIN-CALCIUM CARBONATE 81 MG-300 MG CALCIUM(777 MG) TABLET    Take 1  tablet by mouth once daily.    DICLOFENAC SODIUM 1 % KIT    APPLY 2 TO 4 GRAMS EXTERNALLY TO THE AFFECTED AREA FOUR TIMES DAILY    FEXOFENADINE (ALLEGRA ALLERGY) 180 MG TABLET    Take 1 tablet (180 mg) by mouth once daily.    HYDROCHLOROTHIAZIDE (HYDRODIURIL) 25 MG TABLET    TAKE 1 TABLET BY MOUTH EVERY DAY AS DIRECTED    IBUPROFEN 800 MG TABLET    Take one tablet night before procedure and one tablet every 8 hours post procedure as needed for pain    METOPROLOL SUCCINATE XL (TOPROL-XL) 50 MG 24 HR TABLET    TAKE 1 TABLET BY MOUTH EVERY DAY    MISOPROSTOL (CYTOTEC) 200 MCG TABLET    Place tablet in the vagina night before procedure    MULTIVITAMIN-MIN-IRON-FA-VIT K (BARIATRIC MULTIVITAMINS) 45 MG IRON- 800 MCG-120 MCG CAPSULE    Take by mouth.    NEBULIZER AND COMPRESSOR DEVICE    USE AS DIRECTED.    NITROGLYCERIN (NITROSTAT) 0.4 MG SL TABLET    Place 1 tablet (0.4 mg) under the tongue every 5 minutes if needed.    OMEPRAZOLE (PRILOSEC) 40 MG DR CAPSULE    Take 1 capsule (40 mg) by mouth once daily. Do not crush or chew.    ONDANSETRON ODT (ZOFRAN-ODT) 4 MG DISINTEGRATING TABLET    Take 1 tablet (4 mg) by mouth every 8 hours if needed for nausea or vomiting.    SIMVASTATIN (ZOCOR) 10 MG TABLET    Take 1 tablet (10 mg) by mouth once daily at bedtime.    SUMATRIPTAN (IMITREX) 25 MG TABLET    Take 1 tablet (25 mg) by mouth 1 time if needed.    TOPIRAMATE (TOPAMAX) 100 MG TABLET    Take 1 tablet (100 mg) by mouth 2 times a day.    VENTOLIN HFA 90 MCG/ACTUATION INHALER    Inhale 2 puffs every 6 hours if needed.     Allergies   Allergen Reactions    Latex Unknown    Other Unknown    Sulfa (Sulfonamide Antibiotics) Hives and Unknown    Triethanolamine Oleate Unknown         Review of Systems  All systems reviewed and otherwise negative, except as stated above in HPI.      Physical Exam     Visit Vitals  /69 (BP Location: Left arm, Patient Position: Lying)   Pulse 96   Temp 36.8 °C (98.2 °F) (Tympanic)   Resp 12   Ht  "1.6 m (5' 3\")   Wt 90.7 kg (200 lb)   SpO2 98%   BMI 35.43 kg/m²   OB Status Postmenopausal   Smoking Status Former   BSA 2.01 m²         Physical exam    VS: As documented in the triage note and EMR flowsheet from this visit were reviewed.    General: Patient is AAOx3, appears well developed, well nourished, is a good historian, answers questions appropriately    HEENT: head normocephalic, atraumatic, PERRLA, EOMs intact, oropharynx without erythema or exudate, buccal mucosa intact without lesions, nose is patent bilateral    Neck: supple, full ROM, negative for lymphadenopathy, JVD, thyromegaly, tracheal deviation, nuchal rigidity    Pulmonary: Clear to auscultation bilaterally, No wheezing, rales, or rhonchi, no accessory muscle use, able to speak full clear sentences    Cardiac: Normal rate and rhythm, no murmurs, rubs or gallops    GI: soft, non-tender, non-distended, normoactive bowelsounds in all four quadrants, no masses or organomegaly, no guarding or CVA tenderness noted    Musculoskeletal: full weight bearing, FRANKEL, no joint effusions, clubbing or edema noted    Skin: Warm, dry, intact, no lesions or rashes noted, turgor is good.    Neuro: patient follow commands, cranial nerves 2-12 grossly intact, motor strengths 5/5 upper and lower extremities, sensation are symmetrical. No focal deficits.    Psych: Appropriate mood and affect for situation      Consultants  1) N/A      Impression and Plan  In summary, Juju Adams is admitted to the [facility] Clinical Decision Unit for chest pain. Dr. Wadsworth is the CDU admission attending.    This patient has been risk-stratified based on available history, physical exam, and related study findings. Admission to the observation status for further diagnosis/treatment/monitoring of chest pain is warranted clinically. This extended period of observation is specifically required to determine the need for hospitalization.     The goals of " this admission based on the patient’s clinical problem list are:  1) Stable vital signs  2) ACS rule out    Assessment/ Plan  1) Chest Pain  -Continuous telemetry  -CTA coronary arteries      When met, appropriate disposition will be arranged.        Nathanael Acosta PA-C  02/21/24 0818

## 2024-02-21 NOTE — ED TRIAGE NOTES
PT arrives via EMS from home with c/o 8/10 midsternal chest pain that started around 0245 this morning and woke her up from sleep. PT states the pain is also going into her left arm. PT also endorses nausea. PT has not other complaints at this time.

## 2024-02-21 NOTE — ED PROVIDER NOTES
CC: Chest Pain     History provided by: Patient  Limitations to History: None    HPI:  Juju Adams is a 63 y.o. female with a past medical history of hypertension and hyperlipidemia that presents to the emergency department with chest pain.  This pain woke her from sleep at 0245 this morning and is located in the center of her chest and radiates to her right arm.  The pain is described as pressure and she currently ranks it as an 8/10 on the pain scale.  She was able to ambulate to the ambulance and states there was no worsening of her pain with this.  She did not receive any pain medication via EMS and was transported here for further evaluation.  She did have a brief episode of diaphoresis after she woke with the chest pain but this resolved.  She does have some mild nausea but denied any vomiting, shortness of breath, dizziness, syncope, palpitations, abdominal pain, fevers, cough, hematemesis, unilateral leg swelling or calf pain.  The patient is a former smoker and has a family history of CAD with both her parents dying from this in their 60s/70s.  The patient denies any history of DVT/PE, recent long distance travel, active cancer or recent surgery.  Patient is not on any blood thinners.    External Records Reviewed: Outpatient records reviewed.  ???????????????????????????????????????????????????????????????  Triage Vitals:  T 36.6 °C (97.8 °F)  HR 85  BP (!) 161/97  RR 18  O2 96 % None (Room air)    Vital signs reviewed in nursing triage note, EMR flow sheets, and at patient's bedside.   General: Awake, alert, in no acute distress  Eyes: Gaze conjugate.  No scleral icterus or injection  HENT: Normo-cephalic, atraumatic. No stridor. No rhinorrhea or epistaxis.  CV: Regular rhythm. No murmurs appreciated. Radial pulses 2+ bilaterally  Respiratory: Breathing non-labored, speaking in full sentences.  Scattered occasional wheeze but otherwise clear to auscultation in all lung fields.  GI: Soft,  non-distended, non-tender. No rebound or guarding.  MSK/Extremities: No gross bony deformities. Moving all extremities.  1+ bilateral lower extremity pitting edema  Skin: Warm. Appropriate color  Neuro: Alert. Oriented. Face symmetric. Speech is fluent.  Gross strength and sensation intact in b/l UE and LEs  Psych: Appropriate mood and affect   ???????????????????????????????????????????????????????????????  ED Course/Treatment/Medical Decision Making  Independent Interpretation of Studies:  I independently interpreted: See ED Course Below  -EKG  -Labs  -Chest x-ray    Differential diagnoses considered include but ar not limited to: ACS, PE, aortic dissection, Boerhaave syndrome, pneumonia, angina, GERD    Social Determinants Limiting Care:  None identified    ED Course:  ED Course as of 02/21/24 0948   Wed Feb 21, 2024   0644 Troponin I Series, High Sensitivity (0, 1 HR)  Troponin negative x 2 [RS]   0646 All other labs reviewed with no significant abnormalities. [RS]   0646 XR chest 2 views  Chest x-ray consistent with COPD with no other significant cardiopulmonary findings [RS]   0937 ECG 12 lead  EKG shows NSR at a rate of 92 bpm with normal axis.  UT, QRS and QT intervals are all within normal limits.  There are no ST elevation/depression or T wave inversions noted.  No significant changes when compared to previous EKG on 11/13/2023. [RS]      ED Course User Index  [RS] Cordell Llanos, DO         Diagnoses as of 02/21/24 0948   Chest pain, unspecified type       MDM:  Juju Adams is a 63 y.o. female presenting to the emergency department with symptoms consistent with anginal chest pain.  Patient was mildly hypertensive on arrival to the emergency department but had otherwise stable vital signs and was afebrile.  Patient exam did reveal some mild lower extremity edema and scant wheezes but was otherwise unremarkable.  Patient was given IV pain medications with improvement of her pain.  The patient's  symptoms are not consistent with aortic dissection or Boerhaave syndrome and had no mediastinal widening on chest x-ray making these diagnoses less likely.  Chest x-ray was obtained showing no evidence of pneumonia or other concerning cardiopulmonary findings.  The patient's Wells score was 0 making PE unlikely.  EKG was without signs of ischemia/STEMI and troponin was negative x 2.  The patient's heart score was 4 placing her at moderate risk for MACE and she will be admitted to the CDU for further cardiac risk stratification.  This plan was discussed with the patient and she was agreeable with this.  She was admitted to the CDU in stable condition.    Impression:  Chest pain    Disposition:  Admitted to CDU    Assessment and plan discussed with Dr. Vitaly Llanos, DO   Emergency Medicine, PGY-1       Procedures ? Smart[employer] last updated 2/21/2024 9:48 AM         ATTENDING ATTESTATION  Patient with a history of hypertension hyperlipidemia, family history of coronary artery disease presenting to the emergency department with a central chest pressure sensation radiate down the right arm that awoke her from sleep.  Denied any radiation to the back no falls injuries or trauma no fevers or chills.  Patient is well-appearing at the bedside she is tearful.  She has symmetric upper extremity pulses unremarkable cardiopulmonary exam with clear lungs.  Patient is in no distress.  IV established labs obtained.  Troponin negative.  Heart score 4.  Plan for placement in the clinical decision unit for restratification    EKG reviewed independently by me and agree with interpretation above.    Vitaly Wadsworth, DO  [facility]  [facility]    The patient was seen by the resident/fellow.  I have personally performed a substantive portion of the encounter.  I have seen and examined the patient; agree with the workup, evaluation, MDM, management and diagnosis.    I  have reviewed all the nurses' notes and have confirmed their findings, and have incorporated those findings into this medical record.   The care plan has been discussed with the resident/fellow; I have reviewed the resident/fellow’s note and agree with the documented findings with the exception/addition of information listed above.  On my own examination I agree and incorporated in this document my own history, examination findings and clinical decision making.  All notation in this Addendum supersedes information presented by the resident or PHILLIP as listed above.        Cordell Llanos DO  Resident  02/21/24 0948       Cordell Llanos, DO [place]  02/21/24 0948

## 2024-02-22 ENCOUNTER — APPOINTMENT (OUTPATIENT)
Dept: RADIOLOGY | Facility: HOSPITAL | Age: 64
End: 2024-02-22
Payer: COMMERCIAL

## 2024-02-22 LAB — CARDIAC TROPONIN I PNL SERPL HS: <3 NG/L (ref 0–34)

## 2024-02-22 PROCEDURE — 84484 ASSAY OF TROPONIN QUANT: CPT

## 2024-02-22 PROCEDURE — 2500000002 HC RX 250 W HCPCS SELF ADMINISTERED DRUGS (ALT 637 FOR MEDICARE OP, ALT 636 FOR OP/ED)

## 2024-02-22 PROCEDURE — 2500000004 HC RX 250 GENERAL PHARMACY W/ HCPCS (ALT 636 FOR OP/ED)

## 2024-02-22 PROCEDURE — 96376 TX/PRO/DX INJ SAME DRUG ADON: CPT | Mod: 59 | Performed by: INTERNAL MEDICINE

## 2024-02-22 PROCEDURE — 2550000001 HC RX 255 CONTRASTS: Performed by: PHYSICIAN ASSISTANT

## 2024-02-22 PROCEDURE — 2500000001 HC RX 250 WO HCPCS SELF ADMINISTERED DRUGS (ALT 637 FOR MEDICARE OP)

## 2024-02-22 PROCEDURE — 36415 COLL VENOUS BLD VENIPUNCTURE: CPT

## 2024-02-22 PROCEDURE — G0378 HOSPITAL OBSERVATION PER HR: HCPCS

## 2024-02-22 PROCEDURE — 2500000001 HC RX 250 WO HCPCS SELF ADMINISTERED DRUGS (ALT 637 FOR MEDICARE OP): Performed by: PHYSICIAN ASSISTANT

## 2024-02-22 PROCEDURE — 75574 CT ANGIO HRT W/3D IMAGE: CPT | Performed by: RADIOLOGY

## 2024-02-22 PROCEDURE — 2500000004 HC RX 250 GENERAL PHARMACY W/ HCPCS (ALT 636 FOR OP/ED): Performed by: PHYSICIAN ASSISTANT

## 2024-02-22 PROCEDURE — 2500000002 HC RX 250 W HCPCS SELF ADMINISTERED DRUGS (ALT 637 FOR MEDICARE OP, ALT 636 FOR OP/ED): Performed by: PHYSICIAN ASSISTANT

## 2024-02-22 PROCEDURE — 75574 CT ANGIO HRT W/3D IMAGE: CPT

## 2024-02-22 PROCEDURE — 96375 TX/PRO/DX INJ NEW DRUG ADDON: CPT | Mod: 59 | Performed by: INTERNAL MEDICINE

## 2024-02-22 RX ORDER — SUMATRIPTAN SUCCINATE 25 MG/1
25 TABLET ORAL ONCE AS NEEDED
Status: DISCONTINUED | OUTPATIENT
Start: 2024-02-22 | End: 2024-02-23

## 2024-02-22 RX ORDER — PANTOPRAZOLE SODIUM 40 MG/1
40 TABLET, DELAYED RELEASE ORAL
Status: DISCONTINUED | OUTPATIENT
Start: 2024-02-23 | End: 2024-02-22

## 2024-02-22 RX ORDER — OXYCODONE HYDROCHLORIDE 5 MG/1
2.5 TABLET ORAL EVERY 6 HOURS PRN
Status: DISCONTINUED | OUTPATIENT
Start: 2024-02-22 | End: 2024-02-24 | Stop reason: HOSPADM

## 2024-02-22 RX ORDER — ACETAMINOPHEN 325 MG/1
650 TABLET ORAL EVERY 4 HOURS PRN
Status: DISCONTINUED | OUTPATIENT
Start: 2024-02-22 | End: 2024-02-24 | Stop reason: HOSPADM

## 2024-02-22 RX ORDER — ACETAMINOPHEN 160 MG/5ML
650 SOLUTION ORAL EVERY 4 HOURS PRN
Status: DISCONTINUED | OUTPATIENT
Start: 2024-02-22 | End: 2024-02-22

## 2024-02-22 RX ORDER — ALBUTEROL SULFATE 90 UG/1
2 AEROSOL, METERED RESPIRATORY (INHALATION) EVERY 6 HOURS PRN
Status: DISCONTINUED | OUTPATIENT
Start: 2024-02-22 | End: 2024-02-22

## 2024-02-22 RX ORDER — NAPROXEN SODIUM 220 MG/1
81 TABLET, FILM COATED ORAL DAILY
Status: DISCONTINUED | OUTPATIENT
Start: 2024-02-22 | End: 2024-02-24 | Stop reason: HOSPADM

## 2024-02-22 RX ORDER — ENOXAPARIN SODIUM 100 MG/ML
40 INJECTION SUBCUTANEOUS EVERY 24 HOURS
Status: DISCONTINUED | OUTPATIENT
Start: 2024-02-22 | End: 2024-02-23

## 2024-02-22 RX ORDER — ONDANSETRON 4 MG/1
4 TABLET, ORALLY DISINTEGRATING ORAL EVERY 8 HOURS PRN
Status: DISCONTINUED | OUTPATIENT
Start: 2024-02-22 | End: 2024-02-24 | Stop reason: HOSPADM

## 2024-02-22 RX ORDER — NITROGLYCERIN 0.4 MG/1
0.4 TABLET SUBLINGUAL EVERY 5 MIN PRN
Status: DISCONTINUED | OUTPATIENT
Start: 2024-02-22 | End: 2024-02-24 | Stop reason: HOSPADM

## 2024-02-22 RX ORDER — POLYETHYLENE GLYCOL 3350 17 G/17G
17 POWDER, FOR SOLUTION ORAL DAILY
Status: DISCONTINUED | OUTPATIENT
Start: 2024-02-22 | End: 2024-02-23

## 2024-02-22 RX ORDER — TOPIRAMATE 100 MG/1
100 TABLET, FILM COATED ORAL 2 TIMES DAILY
Status: DISCONTINUED | OUTPATIENT
Start: 2024-02-22 | End: 2024-02-24 | Stop reason: HOSPADM

## 2024-02-22 RX ORDER — PANTOPRAZOLE SODIUM 40 MG/1
40 TABLET, DELAYED RELEASE ORAL
Status: DISCONTINUED | OUTPATIENT
Start: 2024-02-23 | End: 2024-02-23

## 2024-02-22 RX ORDER — ACETAMINOPHEN 650 MG/1
650 SUPPOSITORY RECTAL EVERY 4 HOURS PRN
Status: DISCONTINUED | OUTPATIENT
Start: 2024-02-22 | End: 2024-02-22

## 2024-02-22 RX ADMIN — SIMVASTATIN 10 MG: 20 TABLET, FILM COATED ORAL at 20:49

## 2024-02-22 RX ADMIN — FAMOTIDINE 20 MG: 10 INJECTION INTRAVENOUS at 08:22

## 2024-02-22 RX ADMIN — HYDROCHLOROTHIAZIDE 25 MG: 25 TABLET ORAL at 11:59

## 2024-02-22 RX ADMIN — NITROGLYCERIN 0.8 MG: 0.4 TABLET SUBLINGUAL at 11:04

## 2024-02-22 RX ADMIN — METOPROLOL TARTRATE 100 MG: 100 TABLET, FILM COATED ORAL at 09:27

## 2024-02-22 RX ADMIN — ALBUTEROL SULFATE 2 PUFF: 90 AEROSOL, METERED RESPIRATORY (INHALATION) at 18:51

## 2024-02-22 RX ADMIN — IOHEXOL 90 ML: 350 INJECTION, SOLUTION INTRAVENOUS at 11:18

## 2024-02-22 RX ADMIN — ASPIRIN 81 MG 81 MG: 81 TABLET ORAL at 20:49

## 2024-02-22 RX ADMIN — ONDANSETRON 4 MG: 2 INJECTION INTRAMUSCULAR; INTRAVENOUS at 17:12

## 2024-02-22 RX ADMIN — TOPIRAMATE 100 MG: 100 TABLET, FILM COATED ORAL at 20:49

## 2024-02-22 RX ADMIN — ENOXAPARIN SODIUM 40 MG: 100 INJECTION SUBCUTANEOUS at 16:14

## 2024-02-22 RX ADMIN — ACETAMINOPHEN 650 MG: 325 TABLET ORAL at 10:40

## 2024-02-22 RX ADMIN — METOPROLOL TARTRATE 100 MG: 100 TABLET, FILM COATED ORAL at 08:22

## 2024-02-22 RX ADMIN — FAMOTIDINE 20 MG: 10 INJECTION INTRAVENOUS at 20:50

## 2024-02-22 ASSESSMENT — PAIN - FUNCTIONAL ASSESSMENT: PAIN_FUNCTIONAL_ASSESSMENT: 0-10

## 2024-02-22 ASSESSMENT — PAIN SCALES - GENERAL: PAINLEVEL_OUTOF10: 3

## 2024-02-22 ASSESSMENT — PAIN DESCRIPTION - LOCATION: LOCATION: HEAD

## 2024-02-22 NOTE — PROGRESS NOTES
Disposition Note  Clinical Decision Unit   Patient: Juju Adams  MRN: [id number]  : 1960  Encounter Date: 2024  CDU Provider: Laurita Salazar PA-C      Limitations to history: None  Independent Historian: Yes  External Records Reviewed: ED notes, imaging results and lab work    Subjective:    Juju Adams is a 63 y.o. female has undergone comprehensive diagnostic evaluation and therapeutic management in accordance with the CDU guidelines for Chest pain. Based on patient's clinical response and diagnostic information during this period of observation, it has been determined that the patient will be admitted.    This patient is a 63-year-old female with past medical history of HTN, HLD and family history of CAD presenting to the ED with complaints of chest pain.  Reports that yesterday morning, woke from sleep at 2 in the morning with midsternal chest pain with associated diaphoresis.  She received extensive workup in ED including 2 negative troponins, nonischemic EKG and chest x-ray that was clear.  Was determined that the patient would benefit from CDU admission and observation for further evaluation of her chest pain    On reevaluation in CDU, patient reports that chest pain has moderately improved, though it is still present and describes that as similar in presentation.  Patient received CTA coronary which showed nonobstructive CAD involving left main, LAD and RCA.  She also has a coronary calcium score of 394.  Considering the significant findings, it was determined the patient would benefit from admission for cardiology evaluation and consultation.    Acute evaluation included:   Orders Placed This Encounter   Procedures    XR chest 2 views    Point of Care Ultrasound    CT angio coronary art with heartflow if score >30%    Troponin I Series, High Sensitivity (0, 1 HR)    CBC and Auto Differential    Comprehensive Metabolic Panel    Magnesium    Troponin I, High Sensitivity, Initial     Troponin, High Sensitivity, 1 Hour    B-Type Natriuretic Peptide    Troponin I, High Sensitivity    Adult diet Regular    Vital Signs    Cardiac Monitoring - ED/ICU/PACU Only    Vital Signs    Notify provider    Nursing communication for Metoprolol/Labetalol dosing    Telemetry monitoring    ECG 12 lead    ECG 12 lead    Insert and maintain peripheral IV    Send to CDU    Initiate observation status    ED to floor bed request       Results for orders placed or performed during the hospital encounter of 02/21/24   CBC and Auto Differential   Result Value Ref Range    WBC 5.1 4.4 - 11.3 x10*3/uL    nRBC 0.0 0.0 - 0.0 /100 WBCs    RBC 4.70 4.00 - 5.20 x10*6/uL    Hemoglobin 14.7 12.0 - 16.0 g/dL    Hematocrit 42.9 36.0 - 46.0 %    MCV 91 80 - 100 fL    MCH 31.3 26.0 - 34.0 pg    MCHC 34.3 32.0 - 36.0 g/dL    RDW 13.1 11.5 - 14.5 %    Platelets 215 150 - 450 x10*3/uL    Neutrophils % 50.9 40.0 - 80.0 %    Immature Granulocytes %, Automated 0.2 0.0 - 0.9 %    Lymphocytes % 38.2 13.0 - 44.0 %    Monocytes % 7.5 2.0 - 10.0 %    Eosinophils % 2.4 0.0 - 6.0 %    Basophils % 0.8 0.0 - 2.0 %    Neutrophils Absolute 2.59 1.20 - 7.70 x10*3/uL    Immature Granulocytes Absolute, Automated 0.01 0.00 - 0.70 x10*3/uL    Lymphocytes Absolute 1.94 1.20 - 4.80 x10*3/uL    Monocytes Absolute 0.38 0.10 - 1.00 x10*3/uL    Eosinophils Absolute 0.12 0.00 - 0.70 x10*3/uL    Basophils Absolute 0.04 0.00 - 0.10 x10*3/uL   Comprehensive Metabolic Panel   Result Value Ref Range    Glucose 98 74 - 99 mg/dL    Sodium 138 136 - 145 mmol/L    Potassium 3.8 3.5 - 5.3 mmol/L    Chloride 103 98 - 107 mmol/L    Bicarbonate 29 21 - 32 mmol/L    Anion Gap 10 10 - 20 mmol/L    Urea Nitrogen 14 6 - 23 mg/dL    Creatinine 0.77 0.50 - 1.05 mg/dL    eGFR 86 >60 mL/min/1.73m*2    Calcium 9.6 8.6 - 10.6 mg/dL    Albumin 4.1 3.4 - 5.0 g/dL    Alkaline Phosphatase 72 33 - 136 U/L    Total Protein 6.9 6.4 - 8.2 g/dL    AST 13 9 - 39 U/L    Bilirubin, Total 0.5 0.0 -  1.2 mg/dL    ALT 12 7 - 45 U/L   Magnesium   Result Value Ref Range    Magnesium 1.85 1.60 - 2.40 mg/dL   Troponin I, High Sensitivity, Initial   Result Value Ref Range    Troponin I, High Sensitivity <3 0 - 34 ng/L   Troponin, High Sensitivity, 1 Hour   Result Value Ref Range    Troponin I, High Sensitivity <3 0 - 34 ng/L   B-Type Natriuretic Peptide   Result Value Ref Range    BNP 3 0 - 99 pg/mL   Troponin I, High Sensitivity   Result Value Ref Range    Troponin I, High Sensitivity <3 0 - 34 ng/L   ECG 12 lead   Result Value Ref Range    Ventricular Rate 92 BPM    Atrial Rate 92 BPM    OK Interval 164 ms    QRS Duration 80 ms    QT Interval 376 ms    QTC Calculation(Bazett) 464 ms    P Axis 77 degrees    R Axis 34 degrees    T Axis 68 degrees    QRS Count 15 beats    Q Onset 223 ms    P Onset 141 ms    P Offset 203 ms    T Offset 411 ms    QTC Fredericia 433 ms            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    Other specified cough 02/18/2019    Post-viral cough syndrome    Personal history of other diseases of the respiratory system 12/26/2017    History of acute bronchitis with bronchospasm    Personal history of other diseases of the respiratory system 07/31/2018    History of acute sinusitis    Personal history of other drug therapy 11/01/2016    History of influenza vaccination    Personal history of other infectious and parasitic diseases 04/13/2015    History of herpes zoster     Past Surgical History:   Procedure Laterality Date    TOTAL HIP ARTHROPLASTY  02/13/2014    Total Hip Replacement    TUBAL LIGATION  01/04/2018    Tubal Ligation     Social History     Socioeconomic History    Marital status: Single     Spouse name: Not on file    Number of children: Not on file    Years of education: Not on file    Highest education level: Not on file   Occupational History    Not on file   Tobacco Use    Smoking status:  Former     Packs/day: 0.50     Years: 40.00     Additional pack years: 0.00     Total pack years: 20.00     Types: Cigarettes     Passive exposure: Past    Smokeless tobacco: Never   Vaping Use    Vaping Use: Never used   Substance and Sexual Activity    Alcohol use: Not Currently    Drug use: Never    Sexual activity: Not on file   Other Topics Concern    Not on file   Social History Narrative    Not on file     Social Determinants of Health     Financial Resource Strain: Not on file   Food Insecurity: No Food Insecurity (11/7/2023)    Hunger Vital Sign     Worried About Running Out of Food in the Last Year: Never true     Ran Out of Food in the Last Year: Never true   Transportation Needs: Not on file   Physical Activity: Not on file   Stress: Not on file   Social Connections: Not on file   Intimate Partner Violence: Not on file   Housing Stability: Not on file         Medications/Allergies     Previous Medications    ACETAMINOPHEN (TYLENOL) 325 MG TABLET    Take 1 tablet (325 mg) by mouth every 4 hours if needed.    ACETAMINOPHEN (TYLENOL) 500 MG TABLET    Take 1 tablet (500 mg) by mouth every 4 hours if needed.    ALBUTEROL 90 MCG/ACTUATION INHALER    Inhale 2 puffs every 4 hours if needed.    ALPRAZOLAM (XANAX) 1 MG TABLET    To be given by staff 30 minutes prior to procedure    ASCORBIC ACID, VITAMIN C, 500 MG CAPSULE    Take by mouth.    ASPIRIN-CALCIUM CARBONATE 81 MG-300 MG CALCIUM(777 MG) TABLET    Take 1 tablet by mouth once daily.    DICLOFENAC SODIUM 1 % KIT    APPLY 2 TO 4 GRAMS EXTERNALLY TO THE AFFECTED AREA FOUR TIMES DAILY    FEXOFENADINE (ALLEGRA ALLERGY) 180 MG TABLET    Take 1 tablet (180 mg) by mouth once daily.    HYDROCHLOROTHIAZIDE (HYDRODIURIL) 25 MG TABLET    TAKE 1 TABLET BY MOUTH EVERY DAY AS DIRECTED    IBUPROFEN 800 MG TABLET    Take one tablet night before procedure and one tablet every 8 hours post procedure as needed for pain    METOPROLOL SUCCINATE XL (TOPROL-XL) 50 MG 24 HR TABLET     TAKE 1 TABLET BY MOUTH EVERY DAY    MISOPROSTOL (CYTOTEC) 200 MCG TABLET    Place tablet in the vagina night before procedure    MULTIVITAMIN-MIN-IRON-FA-VIT K (BARIATRIC MULTIVITAMINS) 45 MG IRON- 800 MCG-120 MCG CAPSULE    Take by mouth.    NEBULIZER AND COMPRESSOR DEVICE    USE AS DIRECTED.    NITROGLYCERIN (NITROSTAT) 0.4 MG SL TABLET    Place 1 tablet (0.4 mg) under the tongue every 5 minutes if needed.    OMEPRAZOLE (PRILOSEC) 40 MG DR CAPSULE    Take 1 capsule (40 mg) by mouth once daily. Do not crush or chew.    ONDANSETRON ODT (ZOFRAN-ODT) 4 MG DISINTEGRATING TABLET    Take 1 tablet (4 mg) by mouth every 8 hours if needed for nausea or vomiting.    SIMVASTATIN (ZOCOR) 10 MG TABLET    Take 1 tablet (10 mg) by mouth once daily at bedtime.    SUMATRIPTAN (IMITREX) 25 MG TABLET    Take 1 tablet (25 mg) by mouth 1 time if needed.    TOPIRAMATE (TOPAMAX) 100 MG TABLET    Take 1 tablet (100 mg) by mouth 2 times a day.    VENTOLIN HFA 90 MCG/ACTUATION INHALER    Inhale 2 puffs every 6 hours if needed.     Allergies   Allergen Reactions    Latex Unknown    Other Unknown    Sulfa (Sulfonamide Antibiotics) Hives and Unknown    Triethanolamine Oleate Unknown         Review of Systems  All systems reviewed and otherwise negative, except as stated above in HPI.    Diagnostics reviewed by Laurita Salazar PA-C     Labs:  Results for orders placed or performed during the hospital encounter of 02/21/24   CBC and Auto Differential   Result Value Ref Range    WBC 5.1 4.4 - 11.3 x10*3/uL    nRBC 0.0 0.0 - 0.0 /100 WBCs    RBC 4.70 4.00 - 5.20 x10*6/uL    Hemoglobin 14.7 12.0 - 16.0 g/dL    Hematocrit 42.9 36.0 - 46.0 %    MCV 91 80 - 100 fL    MCH 31.3 26.0 - 34.0 pg    MCHC 34.3 32.0 - 36.0 g/dL    RDW 13.1 11.5 - 14.5 %    Platelets 215 150 - 450 x10*3/uL    Neutrophils % 50.9 40.0 - 80.0 %    Immature Granulocytes %, Automated 0.2 0.0 - 0.9 %    Lymphocytes % 38.2 13.0 - 44.0 %    Monocytes % 7.5 2.0 - 10.0 %    Eosinophils  % 2.4 0.0 - 6.0 %    Basophils % 0.8 0.0 - 2.0 %    Neutrophils Absolute 2.59 1.20 - 7.70 x10*3/uL    Immature Granulocytes Absolute, Automated 0.01 0.00 - 0.70 x10*3/uL    Lymphocytes Absolute 1.94 1.20 - 4.80 x10*3/uL    Monocytes Absolute 0.38 0.10 - 1.00 x10*3/uL    Eosinophils Absolute 0.12 0.00 - 0.70 x10*3/uL    Basophils Absolute 0.04 0.00 - 0.10 x10*3/uL   Comprehensive Metabolic Panel   Result Value Ref Range    Glucose 98 74 - 99 mg/dL    Sodium 138 136 - 145 mmol/L    Potassium 3.8 3.5 - 5.3 mmol/L    Chloride 103 98 - 107 mmol/L    Bicarbonate 29 21 - 32 mmol/L    Anion Gap 10 10 - 20 mmol/L    Urea Nitrogen 14 6 - 23 mg/dL    Creatinine 0.77 0.50 - 1.05 mg/dL    eGFR 86 >60 mL/min/1.73m*2    Calcium 9.6 8.6 - 10.6 mg/dL    Albumin 4.1 3.4 - 5.0 g/dL    Alkaline Phosphatase 72 33 - 136 U/L    Total Protein 6.9 6.4 - 8.2 g/dL    AST 13 9 - 39 U/L    Bilirubin, Total 0.5 0.0 - 1.2 mg/dL    ALT 12 7 - 45 U/L   Magnesium   Result Value Ref Range    Magnesium 1.85 1.60 - 2.40 mg/dL   Troponin I, High Sensitivity, Initial   Result Value Ref Range    Troponin I, High Sensitivity <3 0 - 34 ng/L   Troponin, High Sensitivity, 1 Hour   Result Value Ref Range    Troponin I, High Sensitivity <3 0 - 34 ng/L   B-Type Natriuretic Peptide   Result Value Ref Range    BNP 3 0 - 99 pg/mL   Troponin I, High Sensitivity   Result Value Ref Range    Troponin I, High Sensitivity <3 0 - 34 ng/L   ECG 12 lead   Result Value Ref Range    Ventricular Rate 92 BPM    Atrial Rate 92 BPM    IN Interval 164 ms    QRS Duration 80 ms    QT Interval 376 ms    QTC Calculation(Bazett) 464 ms    P Axis 77 degrees    R Axis 34 degrees    T Axis 68 degrees    QRS Count 15 beats    Q Onset 223 ms    P Onset 141 ms    P Offset 203 ms    T Offset 411 ms    QTC Fredericia 433 ms     Radiographs:  CT angio coronary art with heartflow if score >30%   Final Result   1. Nonobstructive coronary artery disease involving the left main   (minimal stenosis  "less than 25%), LAD and RCA (minimal to mild   stenosis 30-40%). Coronary calcium score is 394.   2. Diffuse peribronchial thickening and mucous plugging in the lower   lobes. Correlate with clinical concern for reactive airways disease   or viral respiratory infection.   3. Small hiatal hernia.             MACRO:   None        Signed by: Yoseph Perry 2/22/2024 11:46 AM   Dictation workstation:   [id number]      XR chest 2 views   Final Result   1.  Findings suggestive of emphysema or  D. No focal consolidation.                  MACRO:   None        Signed by: Sunshine Chambers 2/21/2024 5:00 AM   Dictation workstation:   [id number]      Point of Care Ultrasound    (Results Pending)           Physical Exam at Discharge   Visit Vitals  /68   Pulse 57   Temp 36.7 °C (98.1 °F) (Temporal)   Resp 18   Ht 1.6 m (5' 3\")   Wt 90.7 kg (200 lb)   SpO2 100%   BMI 35.43 kg/m²   OB Status Postmenopausal   Smoking Status Former   BSA 2.01 m²       GENERAL:  The patient appears nourished and normally developed. Vital signs as documented.     HEENT:  Head normocephalic, atraumatic, EOMs intact, PERRLA, Mucous membranes moist. Nares patent without copious rhinorrhea.  No lymphadenopathy.    PULMONARY:  Lungs are clear to auscultation, without any respiratory distress. Able to speak full sentences, no accessory muscle use    CARDIAC:   Normal rate. No murmurs, rubs or gallops    ABDOMEN:  Soft, non-distended, non-tender, BS positive x 4 quadrants, No rebound or guarding, no peritoneal signs, no CVA tenderness, no masses or organomegaly    MUSCULOSKELETAL:   Able to ambulate, Non edematous, with no obvious deformities. Pulses intact distal    SKIN:   Good color, with no significant rashes.  No pallor.    NEURO:  No obvious neurological deficits, normal sensation and strength bilaterally.  Able to follow commands.    Psych: Appropriate mood and affect    Consultants  1) N/A      Impression and Plan    In summary, Juju TRAN" Bryan has been cared for according to the standard [facility] Clinical Decision Unit observation protocol for Chest pain. This extended period of observation was specifically required to determine the need for hospitalization. Prior to discharge from observation, the final physical exam is documented above. I have reviewed the results of the labs and imaging that were performed in the ED as well as the CDU.      Significant events during the course of observation based on the goals of the clinical problem list include:   1) Coronary CTA calcium of 394 with continued CP  2) To be admitted to medicine team with cards eval    Based on the patient's condition and test results, the patient will be admitted    Date and Time of Disposition.   Admit: 2/22/2024    Dr. Brown is the CDU disposition attending.    Laurita Salazar PA-C   Emergency Medicine/Clinical Decision Unit   [facility]   Available via Secure chat

## 2024-02-22 NOTE — PROGRESS NOTES
Subjective  Juju Adams is a 63 y.o. female on day 1 of admission presenting with Chest pain.   Serial assessments of clinical progress include:  1.)  Patient has wheezing, no crackles or rales.  No hypoxia or dyspnea.  Patient still complaining of chest pain.  2.)  Patient denies fever, headache, dizziness, nausea/vomiting or abdominal pain.  3.)  Patient remained hemodynamically stable and neurologically intact during the rest of the night.      Objective  VS reviewed  Physical Exam:  GENERAL:  The patient appears nourished and normally developed. Vital signs as documented.     Appearance: Alert, oriented, cooperative, in no acute distress. Well nourished & well hydrated.    HEENT:  Head normocephalic, atraumatic, EOMs intact, PERRLA, Mucous membranes moist. Nares patent without copious rhinorrhea.  Oropharynx moist and clear.  Uvula midline.    Neck: Supple.  No meningismus.  No swelling.  Trachea midline. No lymphadenopathy.    Pulmonary: Wheezing to bilateral lower fields, without any respiratory distress.  No rhonchi, crackles or rales.  No hypoxia or dyspnea.  Able to speak full sentences, no accessory muscle use.    Cardia:   Regular rate and rhythm. No murmurs, rubs or gallops.  No JVD.    GI:  Soft, non-distended, non-tender, BS positive x 4 quadrants, No rebound or guarding, no peritoneal signs, no CVA tenderness, no masses or organomegaly.    Musculoskeletal: Symmetrical muscle bulk.  No peripheral edema.  Pulses intact distal.  Able to walk.    Integumentary: Warm, dry and intact.  No pallor or jaundice.  No lesions, rashes or open sores.    NEURO:  No obvious neurological deficits, normal sensation and strength bilaterally.  Speech clear and fluent.  Able to follow commands, CN 2-12 intact.    Psych: Appropriate mood and affect.      Relevant Results  Results for orders placed or performed during the hospital encounter of 02/21/24 (from the past 24 hour(s))   ECG 12 lead   Result Value Ref  Range    Ventricular Rate 92 BPM    Atrial Rate 92 BPM    TX Interval 164 ms    QRS Duration 80 ms    QT Interval 376 ms    QTC Calculation(Bazett) 464 ms    P Axis 77 degrees    R Axis 34 degrees    T Axis 68 degrees    QRS Count 15 beats    Q Onset 223 ms    P Onset 141 ms    P Offset 203 ms    T Offset 411 ms    QTC Fredericia 433 ms   CBC and Auto Differential   Result Value Ref Range    WBC 5.1 4.4 - 11.3 x10*3/uL    nRBC 0.0 0.0 - 0.0 /100 WBCs    RBC 4.70 4.00 - 5.20 x10*6/uL    Hemoglobin 14.7 12.0 - 16.0 g/dL    Hematocrit 42.9 36.0 - 46.0 %    MCV 91 80 - 100 fL    MCH 31.3 26.0 - 34.0 pg    MCHC 34.3 32.0 - 36.0 g/dL    RDW 13.1 11.5 - 14.5 %    Platelets 215 150 - 450 x10*3/uL    Neutrophils % 50.9 40.0 - 80.0 %    Immature Granulocytes %, Automated 0.2 0.0 - 0.9 %    Lymphocytes % 38.2 13.0 - 44.0 %    Monocytes % 7.5 2.0 - 10.0 %    Eosinophils % 2.4 0.0 - 6.0 %    Basophils % 0.8 0.0 - 2.0 %    Neutrophils Absolute 2.59 1.20 - 7.70 x10*3/uL    Immature Granulocytes Absolute, Automated 0.01 0.00 - 0.70 x10*3/uL    Lymphocytes Absolute 1.94 1.20 - 4.80 x10*3/uL    Monocytes Absolute 0.38 0.10 - 1.00 x10*3/uL    Eosinophils Absolute 0.12 0.00 - 0.70 x10*3/uL    Basophils Absolute 0.04 0.00 - 0.10 x10*3/uL   Comprehensive Metabolic Panel   Result Value Ref Range    Glucose 98 74 - 99 mg/dL    Sodium 138 136 - 145 mmol/L    Potassium 3.8 3.5 - 5.3 mmol/L    Chloride 103 98 - 107 mmol/L    Bicarbonate 29 21 - 32 mmol/L    Anion Gap 10 10 - 20 mmol/L    Urea Nitrogen 14 6 - 23 mg/dL    Creatinine 0.77 0.50 - 1.05 mg/dL    eGFR 86 >60 mL/min/1.73m*2    Calcium 9.6 8.6 - 10.6 mg/dL    Albumin 4.1 3.4 - 5.0 g/dL    Alkaline Phosphatase 72 33 - 136 U/L    Total Protein 6.9 6.4 - 8.2 g/dL    AST 13 9 - 39 U/L    Bilirubin, Total 0.5 0.0 - 1.2 mg/dL    ALT 12 7 - 45 U/L   Magnesium   Result Value Ref Range    Magnesium 1.85 1.60 - 2.40 mg/dL   Troponin I, High Sensitivity, Initial   Result Value Ref Range     Troponin I, High Sensitivity <3 0 - 34 ng/L   B-Type Natriuretic Peptide   Result Value Ref Range    BNP 3 0 - 99 pg/mL   Troponin, High Sensitivity, 1 Hour   Result Value Ref Range    Troponin I, High Sensitivity <3 0 - 34 ng/L       Imaging Results  ECG 12 lead    Result Date: 2/21/2024  Normal sinus rhythm Anterior infarct (cited on or before 13-NOV-2023) Abnormal ECG When compared with ECG of 13-NOV-2023 21:14, QRS axis Shifted right See ED provider note for full interpretation and clinical correlation Confirmed by Sj Willams ([id number]) on 2/21/2024 5:40:14 AM    XR chest 2 views    Result Date: 2/21/2024  Interpreted By:  Sunshine Chambers, STUDY: XR CHEST 2 VIEWS;  2/21/2024 4:56 am   INDICATION: Signs/Symptoms:Chest Pain.   COMPARISON: 11/13/2023   ACCESSION NUMBER(S): [id number]   ORDERING CLINICIAN: PINEDA SIMMS   FINDINGS: PA and lateral radiographs of the chest were provided.       CARDIOMEDIASTINAL SILHOUETTE: Cardiomediastinal silhouette is normal in size and configuration.   LUNGS: Lungs are mildly hyperinflated. Left basilar atelectasis or scarring. No focal consolidation, pleural effusion or sizable pneumothorax seen.   ABDOMEN: No remarkable upper abdominal findings.   BONES: No acute osseous changes.       1.  Findings suggestive of emphysema or  D. No focal consolidation.       MACRO: None   Signed by: Sunshine Chambers 2/21/2024 5:00 AM Dictation workstation:   [id number]      Medications:  famotidine, 20 mg, intravenous, q12h GISELA  hydroCHLOROthiazide, 25 mg, oral, Daily  metoprolol succinate XL, 50 mg, oral, Daily  metoprolol, 5 mg, intravenous, Once  metoprolol tartrate, 100 mg, oral, Once  nitroglycerin, 0.8 mg, sublingual, Once  simvastatin, 10 mg, oral, Nightly       PRN medications: albuterol, LORazepam, metoprolol, metoprolol, metoprolol, metoprolol, metoprolol tartrate, ondansetron     Assessment/Plan     Juju Adams continues to be managed in accordance with the CDU clinical  guidelines for chest pain. An update of their clinical problem list included:  1.)  Chest pain  -Continuous telemetry  -CTA coronary arteries  -Continue home medications    2.)  Asthma  -Continue home nebulized treatments.    We will observe the patient for the following endpoints:   1.)  No acute arrhythmic changes  2.)  Stable vitals  3.)  Symptomatic improvement  4.)  Clear CTA    When met, appropriate disposition will be arranged.    Juju Adams has been admitted to the CDU for 19 hours. I spent 25 minutes in the professional and overall care of this patient   @OBS@    Tory Vidal, APRN-CNP  [facility]  Emergency Department  Extension [phone]

## 2024-02-22 NOTE — H&P
Admission For   Coming for: Chest Pain; Cardiology consulted for CTA findings    HPI:   Juju Adams is a 62 y/o F w/ a PMH of HTN, HLD, Asthma, GERD and rectal bleeding that presented to the ED w/ chest pain.  She reports that she woke from her sleep at ~02:45 and felt a pressured chest pain rated an 8/10 that she said radiated to her R arm. She was frightened, given that her sister who was one year older just passed away a month ago, and woke her BF lying next to her to call EMS. This episode was also worrying for her because she reported sweating when she woke up and has also been feeling nauseous.     En route, she noted that her BP was elevated to ~190s/90. She did not receive any medications from EMS. She reports that she has had episodes of chest pain at rest and has PRN Nitroglycerine that is prescribed by her PCP, Dr. Coombs, and has been using this for many years. She claims to have had about 4 episodes this past month that has required using it. She also complained of having to use her Albuterol inhaler about 4 times in the last month where she has been woken up from sleep w/ difficulty breathing. These have promptly resolved each time. Her only other complaints were about significant GERD as well as a ~40Ib weight gain in the last 2 years. She is currently working to control her weight with diet and exercise. She reports taking Bps at home regularly and she runs in the 120s/70s.    She states she has been sleeping with 3 pillows to prop her head up for comfort for a while now and has noticed her legs are more swollen in the morning by the creases she sees on them on waking. She does reports feeling more tired on moderate walks than usual around the block than usual over several years. She has never been diagnosed w/ COPD or Heart Failure and reports taking her medications regularly. She works here in the hospital, at the  of the [facility] [facility] [employer], and has had no difficulties  performing her ADLs and IADLS.     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    Other specified cough 2019     Post-viral cough syndrome    Personal history of other diseases of the respiratory system 2017     History of acute bronchitis with bronchospasm    Personal history of other diseases of the respiratory system 2018     History of acute sinusitis    Personal history of other drug therapy 2016     History of influenza vaccination    Personal history of other infectious and parasitic diseases 2015     History of herpes zoster     PAST SURGICAL HISTORY:   B/l IVELISSE  L TKA     FAMILY HISTORY:  Mother Heart attack     Hypertension       Father Heart attack     Hypertension    Stroke       Sister Diabetes    Hypertension    Seizures            SOCIAL HISTORY:  - Living Situation: Lives w/ boyfriend   - Functional Status: Able to perform ADLs/iADLs w/o difficulties  - Tobacco:  20 pack years but stopped about 20 years ago  - Alcohol: Denied   - Drugs:  Denied    Allergies:  Allergen Reactions    Latex Unknown    Other Unknown    Sulfa (Sulfonamide Antibiotics) Hives and Unknown    Triethanolamine Oleate Unknown     Home Medications:    ED Data:  - Vital Signs: T 36.8  P  96 RR 12 /69  SPO2  98%  - Labs:  CBC: WBC 5.1  Hgb 14.7  Plt 215  CHEM: Na  138 K 3.8  Cl 103  HCO3 29  Cr 0.77  BUN 14  Mag 1.85  Ca 9.6  Glu 98  LFTS: AST 13 ALT 12  ALP 72  TBili 0.5  Alb 4.1  Trop <3, <3   BNP 3   - EKG:    EKG was without acute ischemic changes or arrhythmias   - Imaging:   CTA findings of Nonobstructive coronary artery disease involving the left main (minimal stenosis less than 25%), LAD and RCA (minimal to mild stenosis 30-40%).   - ED Interventions:   Morphine with improvement in her chest pain symptoms        Objective:  VS:  /68    "Pulse 57   Temp 36.7 °C (98.1 °F) (Temporal)   Resp 18   Ht 1.6 m (5' 3\")   Wt 90.7 kg (200 lb)   SpO2 100%   BMI 35.43 kg/m²    PE:  General: awake, alert, conversant, appears stated age  HEENT: pupils equal and round, no scleral icterus or conjunctivitis  Skin: no suspect lesions or rashes noted on visible skin  Chest: ctab, normal respiratory effort, w/ 2L NC O2. Stated for comfort.  Cardiac: regular rate and rhythm, normal s1, s2, no M/R/G, no JVD  Abdomen: soft, enlarged but ND, NT, no involuntary guarding  : no flank pain or indwelling urinary catheter  EXT: b/l LE enlarged but non-edematous, no asymmetry noted  MSK: no focal joint swelling noted  Neuro: AOx4, moving all limbs spontaneously, follows commands  Psych: coherent thought process, appropriate mood and affect     Labs:  Results from last 7 days   Lab Units 02/21/24  0432   WBC AUTO x10*3/uL 5.1   HEMOGLOBIN g/dL 14.7   HEMATOCRIT % 42.9   PLATELETS AUTO x10*3/uL 215   NEUTROS PCT AUTO % 50.9   LYMPHS PCT AUTO % 38.2   MONOS PCT AUTO % 7.5   EOS PCT AUTO % 2.4     Results from last 7 days   Lab Units 02/21/24  0432   SODIUM mmol/L 138   POTASSIUM mmol/L 3.8   CHLORIDE mmol/L 103   CO2 mmol/L 29   BUN mg/dL 14   CREATININE mg/dL 0.77   CALCIUM mg/dL 9.6   PROTEIN TOTAL g/dL 6.9   BILIRUBIN TOTAL mg/dL 0.5   ALK PHOS U/L 72   ALT U/L 12   AST U/L 13   GLUCOSE mg/dL 98     Results from last 7 days   Lab Units 02/21/24  0432   MAGNESIUM mg/dL 1.85     Trop: <3, <3  BNP: 3     Imaging:  CT angio coronary art with heartflow if score >30%  Result Date: 2/22/2024  1. Nonobstructive coronary artery disease involving the left main (minimal stenosis less than 25%), LAD and RCA (minimal to mild stenosis 30-40%). Coronary calcium score is 394.   2. Diffuse peribronchial thickening and mucous plugging in the lower lobes. Correlate with clinical concern for reactive airways disease or viral respiratory infection.   3. Small hiatal hernia.         XR chest 2 " views  Result Date: 2/21/2024  1.  Findings suggestive of emphysema or  D. No focal consolidation.     Meds:  Scheduled medications  aspirin, 81 mg, oral, Daily  enoxaparin, 40 mg, subcutaneous, q24h  famotidine, 20 mg, intravenous, q12h GISELA  hydroCHLOROthiazide, 25 mg, oral, Daily  metoprolol succinate XL, 50 mg, oral, Daily  [START ON 2/23/2024] pantoprazole, 40 mg, oral, Daily before breakfast  polyethylene glycol, 17 g, oral, Daily  simvastatin, 10 mg, oral, Nightly  topiramate, 100 mg, oral, BID    Continuous medications     PRN medications  PRN medications: acetaminophen **OR** [DISCONTINUED] acetaminophen **OR** [DISCONTINUED] acetaminophen, albuterol, nitroglycerin, ondansetron ODT, oxyCODONE, SUMAtriptan      Assessment and Plan:  Pt is a 62 y/o F w/ a PMH of HTN, HLD, Asthma, GERD and rectal bleeding that presented to the ED w/ chest pain. She has a significant family history of cardiac disease and with CTA findings showing elevated coronary calcium score of 394 and mild to moderate stenosis of the LAD and RCA. Trops/BNP/EKG ischemic event and heart strain. ESPINOZA score 30 (intermediate risk)/Heart score 2 (low risk). Most likely diagnosis at this point is stable angina from but w/ r/o done for possible microvascular dysfunction(predominant in females). Possible cards consult for further workup. Less likely asthma exacerbation as she was not in significant distress and no wheezing on PE.      #Chest pain  #Variant Angina  ::Less likely UA/Microvascular dysfunction; EKG, Trops neg  ::Espinoza score 30 intermediate risk  ::Heart 2 score low risk  ::Trops/bnp neg  ::No hx of TTE done   ::Stress test done 3/21 w/ normal findings and evidence of ischemia at maximal workload  -consider Cards consult in the am: for possible updated stress test vs TTE vs Cath  -continue home nitroglycerin  -continue home metoprolol succinate  - pt against imdur tacphylaxis  -will most rosalinda start imdur tommorow  -Labs in the  am     #HTN  #HLD  - C/w home metoprolol succinate 50mg Daily  - C/w home statin     #GERD  ::Possible exarcebation  ::On Omeprazole at home  ::Reports of recent worsening  -Start Pantoprazole 40mg daily  - C/w Famotidine 20mg started in ED    #Asthma  :: Recent awakenings to use rescue inhaler  :: No wheezing on PE  - C/w home Albuterol inhaler    #HA  - C/w Sumatriptan 25mg PRN daily   - C/w Topiramate 100mg BID      F: as needed  E: as needed  N: low Na  A: Subcute levonox    DVT PPx: Lovenox SubQ  GI PPx: Pantoprazole and Famotidine     Code Status: Full Code   Surrogate Medical Decision-maker:   Arti (Daughter)   Terrence Gannon (Boyfriend)

## 2024-02-23 ENCOUNTER — APPOINTMENT (OUTPATIENT)
Dept: CARDIOLOGY | Facility: HOSPITAL | Age: 64
End: 2024-02-23
Payer: COMMERCIAL

## 2024-02-23 LAB
ALBUMIN SERPL BCP-MCNC: 3.6 G/DL (ref 3.4–5)
ALP SERPL-CCNC: 72 U/L (ref 33–136)
ALT SERPL W P-5'-P-CCNC: 11 U/L (ref 7–45)
AMPHETAMINES UR QL SCN: ABNORMAL
ANION GAP SERPL CALC-SCNC: 12 MMOL/L (ref 10–20)
AORTIC VALVE PEAK VELOCITY: 1.26 M/S
AST SERPL W P-5'-P-CCNC: 9 U/L (ref 9–39)
AV PEAK GRADIENT: 6.4 MMHG
AVA (PEAK VEL): 3.11 CM2
BARBITURATES UR QL SCN: ABNORMAL
BASOPHILS # BLD AUTO: 0.03 X10*3/UL (ref 0–0.1)
BASOPHILS NFR BLD AUTO: 0.6 %
BENZODIAZ UR QL SCN: ABNORMAL
BILIRUB SERPL-MCNC: 0.4 MG/DL (ref 0–1.2)
BUN SERPL-MCNC: 17 MG/DL (ref 6–23)
BZE UR QL SCN: ABNORMAL
CALCIUM SERPL-MCNC: 9.7 MG/DL (ref 8.6–10.6)
CANNABINOIDS UR QL SCN: ABNORMAL
CHLORIDE SERPL-SCNC: 103 MMOL/L (ref 98–107)
CO2 SERPL-SCNC: 29 MMOL/L (ref 21–32)
CREAT SERPL-MCNC: 0.95 MG/DL (ref 0.5–1.05)
EGFRCR SERPLBLD CKD-EPI 2021: 67 ML/MIN/1.73M*2
EOSINOPHIL # BLD AUTO: 0.27 X10*3/UL (ref 0–0.7)
EOSINOPHIL NFR BLD AUTO: 5.4 %
ERYTHROCYTE [DISTWIDTH] IN BLOOD BY AUTOMATED COUNT: 12.9 % (ref 11.5–14.5)
FENTANYL+NORFENTANYL UR QL SCN: ABNORMAL
GLUCOSE SERPL-MCNC: 92 MG/DL (ref 74–99)
HCT VFR BLD AUTO: 40.7 % (ref 36–46)
HGB BLD-MCNC: 13.7 G/DL (ref 12–16)
HOLD SPECIMEN: NORMAL
IMM GRANULOCYTES # BLD AUTO: 0.01 X10*3/UL (ref 0–0.7)
IMM GRANULOCYTES NFR BLD AUTO: 0.2 % (ref 0–0.9)
LEFT ATRIUM VOLUME AREA LENGTH INDEX BSA: 24.6 ML/M2
LEFT VENTRICULAR OUTFLOW TRACT DIAMETER: 2.2 CM
LYMPHOCYTES # BLD AUTO: 2 X10*3/UL (ref 1.2–4.8)
LYMPHOCYTES NFR BLD AUTO: 39.8 %
MAGNESIUM SERPL-MCNC: 1.91 MG/DL (ref 1.6–2.4)
MCH RBC QN AUTO: 31.2 PG (ref 26–34)
MCHC RBC AUTO-ENTMCNC: 33.7 G/DL (ref 32–36)
MCV RBC AUTO: 93 FL (ref 80–100)
MITRAL VALVE E/A RATIO: 1.04
MITRAL VALVE E/E' RATIO: 10.88
MONOCYTES # BLD AUTO: 0.43 X10*3/UL (ref 0.1–1)
MONOCYTES NFR BLD AUTO: 8.5 %
NEUTROPHILS # BLD AUTO: 2.29 X10*3/UL (ref 1.2–7.7)
NEUTROPHILS NFR BLD AUTO: 45.5 %
NRBC BLD-RTO: 0 /100 WBCS (ref 0–0)
OPIATES UR QL SCN: ABNORMAL
OXYCODONE+OXYMORPHONE UR QL SCN: ABNORMAL
PCP UR QL SCN: ABNORMAL
PLATELET # BLD AUTO: 215 X10*3/UL (ref 150–450)
POTASSIUM SERPL-SCNC: 3.9 MMOL/L (ref 3.5–5.3)
PROCALCITONIN SERPL-MCNC: <0.02 NG/ML
PROT SERPL-MCNC: 6.1 G/DL (ref 6.4–8.2)
RBC # BLD AUTO: 4.39 X10*6/UL (ref 4–5.2)
RIGHT VENTRICLE FREE WALL PEAK S': 13.6 CM/S
SODIUM SERPL-SCNC: 140 MMOL/L (ref 136–145)
TRICUSPID ANNULAR PLANE SYSTOLIC EXCURSION: 1.8 CM
WBC # BLD AUTO: 5 X10*3/UL (ref 4.4–11.3)

## 2024-02-23 PROCEDURE — 99222 1ST HOSP IP/OBS MODERATE 55: CPT | Performed by: INTERNAL MEDICINE

## 2024-02-23 PROCEDURE — 36415 COLL VENOUS BLD VENIPUNCTURE: CPT

## 2024-02-23 PROCEDURE — G0378 HOSPITAL OBSERVATION PER HR: HCPCS

## 2024-02-23 PROCEDURE — 93306 TTE W/DOPPLER COMPLETE: CPT

## 2024-02-23 PROCEDURE — 93306 TTE W/DOPPLER COMPLETE: CPT | Performed by: INTERNAL MEDICINE

## 2024-02-23 PROCEDURE — 2500000004 HC RX 250 GENERAL PHARMACY W/ HCPCS (ALT 636 FOR OP/ED): Performed by: PHYSICIAN ASSISTANT

## 2024-02-23 PROCEDURE — 84145 PROCALCITONIN (PCT): CPT

## 2024-02-23 PROCEDURE — 99222 1ST HOSP IP/OBS MODERATE 55: CPT | Performed by: STUDENT IN AN ORGANIZED HEALTH CARE EDUCATION/TRAINING PROGRAM

## 2024-02-23 PROCEDURE — 84075 ASSAY ALKALINE PHOSPHATASE: CPT

## 2024-02-23 PROCEDURE — 80307 DRUG TEST PRSMV CHEM ANLYZR: CPT

## 2024-02-23 PROCEDURE — 2500000004 HC RX 250 GENERAL PHARMACY W/ HCPCS (ALT 636 FOR OP/ED)

## 2024-02-23 PROCEDURE — 96376 TX/PRO/DX INJ SAME DRUG ADON: CPT | Mod: 59 | Performed by: INTERNAL MEDICINE

## 2024-02-23 PROCEDURE — 2500000001 HC RX 250 WO HCPCS SELF ADMINISTERED DRUGS (ALT 637 FOR MEDICARE OP)

## 2024-02-23 PROCEDURE — 2500000001 HC RX 250 WO HCPCS SELF ADMINISTERED DRUGS (ALT 637 FOR MEDICARE OP): Performed by: PHYSICIAN ASSISTANT

## 2024-02-23 PROCEDURE — 2500000002 HC RX 250 W HCPCS SELF ADMINISTERED DRUGS (ALT 637 FOR MEDICARE OP, ALT 636 FOR OP/ED)

## 2024-02-23 PROCEDURE — 83735 ASSAY OF MAGNESIUM: CPT

## 2024-02-23 PROCEDURE — 85025 COMPLETE CBC W/AUTO DIFF WBC: CPT

## 2024-02-23 RX ORDER — GUAIFENESIN 600 MG/1
600 TABLET, EXTENDED RELEASE ORAL 2 TIMES DAILY
Status: DISCONTINUED | OUTPATIENT
Start: 2024-02-23 | End: 2024-02-24 | Stop reason: HOSPADM

## 2024-02-23 RX ORDER — CARVEDILOL 25 MG/1
25 TABLET ORAL
Status: DISCONTINUED | OUTPATIENT
Start: 2024-02-24 | End: 2024-02-24 | Stop reason: HOSPADM

## 2024-02-23 RX ORDER — AMOXICILLIN AND CLAVULANATE POTASSIUM 875; 125 MG/1; MG/1
1 TABLET, FILM COATED ORAL EVERY 12 HOURS SCHEDULED
Status: DISCONTINUED | OUTPATIENT
Start: 2024-02-23 | End: 2024-02-24 | Stop reason: HOSPADM

## 2024-02-23 RX ORDER — POLYETHYLENE GLYCOL 3350 17 G/17G
17 POWDER, FOR SOLUTION ORAL DAILY
Status: DISCONTINUED | OUTPATIENT
Start: 2024-02-23 | End: 2024-02-24 | Stop reason: HOSPADM

## 2024-02-23 RX ORDER — ATORVASTATIN CALCIUM 40 MG/1
40 TABLET, FILM COATED ORAL NIGHTLY
Status: DISCONTINUED | OUTPATIENT
Start: 2024-02-23 | End: 2024-02-24 | Stop reason: HOSPADM

## 2024-02-23 RX ORDER — PANTOPRAZOLE SODIUM 40 MG/1
40 TABLET, DELAYED RELEASE ORAL
Status: DISCONTINUED | OUTPATIENT
Start: 2024-02-24 | End: 2024-02-24 | Stop reason: HOSPADM

## 2024-02-23 RX ORDER — ENOXAPARIN SODIUM 100 MG/ML
40 INJECTION SUBCUTANEOUS EVERY 24 HOURS
Status: DISCONTINUED | OUTPATIENT
Start: 2024-02-23 | End: 2024-02-24 | Stop reason: HOSPADM

## 2024-02-23 RX ADMIN — ASPIRIN 81 MG 81 MG: 81 TABLET ORAL at 09:20

## 2024-02-23 RX ADMIN — GUAIFENESIN 600 MG: 600 TABLET ORAL at 11:29

## 2024-02-23 RX ADMIN — METOPROLOL SUCCINATE 50 MG: 50 TABLET, EXTENDED RELEASE ORAL at 09:19

## 2024-02-23 RX ADMIN — TOPIRAMATE 100 MG: 100 TABLET, FILM COATED ORAL at 11:26

## 2024-02-23 RX ADMIN — POLYETHYLENE GLYCOL 3350 17 G: 17 POWDER, FOR SOLUTION ORAL at 09:21

## 2024-02-23 RX ADMIN — TOPIRAMATE 100 MG: 100 TABLET, FILM COATED ORAL at 20:33

## 2024-02-23 RX ADMIN — GUAIFENESIN 600 MG: 600 TABLET ORAL at 20:33

## 2024-02-23 RX ADMIN — AMOXICILLIN AND CLAVULANATE POTASSIUM 1 TABLET: 875; 125 TABLET, FILM COATED ORAL at 20:33

## 2024-02-23 RX ADMIN — HYDROCHLOROTHIAZIDE 25 MG: 25 TABLET ORAL at 09:20

## 2024-02-23 RX ADMIN — PANTOPRAZOLE SODIUM 40 MG: 40 TABLET, DELAYED RELEASE ORAL at 06:44

## 2024-02-23 RX ADMIN — ATORVASTATIN CALCIUM 40 MG: 40 TABLET, FILM COATED ORAL at 20:33

## 2024-02-23 RX ADMIN — SUMATRIPTAN SUCCINATE 25 MG: 25 TABLET ORAL at 10:29

## 2024-02-23 RX ADMIN — PERFLUTREN 2 ML OF DILUTION: 6.52 INJECTION, SUSPENSION INTRAVENOUS at 14:53

## 2024-02-23 RX ADMIN — FAMOTIDINE 20 MG: 10 INJECTION INTRAVENOUS at 09:20

## 2024-02-23 RX ADMIN — ENOXAPARIN SODIUM 40 MG: 100 INJECTION SUBCUTANEOUS at 16:30

## 2024-02-23 RX ADMIN — AMOXICILLIN AND CLAVULANATE POTASSIUM 1 TABLET: 875; 125 TABLET, FILM COATED ORAL at 11:29

## 2024-02-23 RX ADMIN — NITROGLYCERIN 0.4 MG: 0.4 TABLET SUBLINGUAL at 07:18

## 2024-02-23 ASSESSMENT — COGNITIVE AND FUNCTIONAL STATUS - GENERAL
MOBILITY SCORE: 24
DAILY ACTIVITIY SCORE: 24

## 2024-02-23 ASSESSMENT — PAIN SCALES - GENERAL
PAINLEVEL_OUTOF10: 0 - NO PAIN
PAINLEVEL_OUTOF10: 0 - NO PAIN
PAINLEVEL_OUTOF10: 2
PAINLEVEL_OUTOF10: 4

## 2024-02-23 ASSESSMENT — PAIN - FUNCTIONAL ASSESSMENT
PAIN_FUNCTIONAL_ASSESSMENT: 0-10

## 2024-02-23 ASSESSMENT — PAIN DESCRIPTION - DESCRIPTORS
DESCRIPTORS: ACHING
DESCRIPTORS: ACHING

## 2024-02-23 NOTE — PROGRESS NOTES
Juju Adams is a 63 y.o. female on day 0 of admission presenting with Chest pain.    OVN:  No acute events overnight reported    Subjective:  Pt was seen at the bedside and stated feeling some midline chest pain rated 4/10 and would be receiving pain meds for and these have helped. Stated some continued nausea but no emesis. She also endorsed some bloating but said she had a BM 2 days ago. There were no other complaints of vision changes/light headedness, chest pain, dyspnea, abdominal pain, dysuria.     Objective:  VS:      2/22/2024    11:10 AM 2/22/2024     2:48 PM 2/22/2024     7:45 PM 2/22/2024    10:00 PM 2/23/2024    12:14 AM 2/23/2024     4:00 AM 2/23/2024     6:26 AM   Vitals   Systolic 113 119 142 134 118 112 115   Diastolic 68 70 77 72 73 62 75   Heart Rate  65 79 84 76 78 72   Temp      36.7 °C (98.1 °F)    Resp  18 17 16 16 18 18        Physical Exam:  General: awake, alert, conversant, appears stated age  HEENT: pupils equal and round, no scleral icterus or conjunctivitis  Skin: no suspect lesions or rashes noted on visible skin  Chest: ctab, normal respiratory effort, not on supplemental oxygen  Cardiac: regular rate and rhythm, normal s1, s2, no M/R/G, no JVD  Abdomen: soft, ND, NT, no involuntary guarding  : no flank pain or indwelling urinary catheter  EXT: no peripheral edema, no asymmetry noted  MSK: no focal joint swelling noted  Neuro: AOx4, moving all limbs spontaneously, follows commands  Psych: coherent thought process, appropriate mood and affect    Labs:  Results from last 7 days   Lab Units 02/23/24  0644 02/21/24  0432   WBC AUTO x10*3/uL 5.0 5.1   HEMOGLOBIN g/dL 13.7 14.7   HEMATOCRIT % 40.7 42.9   PLATELETS AUTO x10*3/uL 215 215   NEUTROS PCT AUTO % 45.5 50.9   LYMPHS PCT AUTO % 39.8 38.2   MONOS PCT AUTO % 8.5 7.5   EOS PCT AUTO % 5.4 2.4     Results from last 7 days   Lab Units 02/23/24  0644 02/21/24  0432   SODIUM mmol/L 140 138   POTASSIUM mmol/L 3.9 3.8   CHLORIDE  mmol/L 103 103   CO2 mmol/L 29 29   BUN mg/dL 17 14   CREATININE mg/dL 0.95 0.77   CALCIUM mg/dL 9.7 9.6   PROTEIN TOTAL g/dL 6.1* 6.9   BILIRUBIN TOTAL mg/dL 0.4 0.5   ALK PHOS U/L 72 72   ALT U/L 11 12   AST U/L 9 13   GLUCOSE mg/dL 92 98     Results from last 7 days   Lab Units 02/23/24  0644 02/21/24  0432   MAGNESIUM mg/dL 1.91 1.85     Micro:  None pending    Imaging:  Transthoracic Echo (TTE) Complete  Result Date: 2/23/2024  1. Left ventricular systolic function is normal with a 60% estimated ejection fraction.    2. Poorly visualized anatomical structures due to suboptimal image quality.     CT angio coronary art with heartflow if score >30%  Result Date: 2/22/2024  1. Nonobstructive coronary artery disease involving the left main (minimal stenosis less than 25%), LAD and RCA (minimal to mild stenosis 30-40%). Coronary calcium score is 394. 2. Diffuse peribronchial thickening and mucous plugging in the lower lobes. Correlate with clinical concern for reactive airways disease or viral respiratory infection.   3. Small hiatal hernia.        Medications:  Scheduled medications  aspirin, 81 mg, oral, Daily  enoxaparin, 40 mg, subcutaneous, q24h  famotidine, 20 mg, intravenous, q12h GISELA  hydroCHLOROthiazide, 25 mg, oral, Daily  metoprolol succinate XL, 50 mg, oral, Daily  pantoprazole, 40 mg, oral, Daily before breakfast  polyethylene glycol, 17 g, oral, Daily  simvastatin, 10 mg, oral, Nightly  topiramate, 100 mg, oral, BID      Continuous medications     PRN medications  PRN medications: acetaminophen **OR** [DISCONTINUED] acetaminophen **OR** [DISCONTINUED] acetaminophen, albuterol, nitroglycerin, ondansetron ODT, oxyCODONE, SUMAtriptan    Assessment and Plan:  Pt is a 64 y/o F w/ a PMH of HTN, HLD, Asthma, GERD and rectal bleeding that presented to the ED w/ chest pain. She has a significant family history of cardiac disease and with CTA findings showing elevated coronary calcium score of 394 and mild to  moderate stenosis of the LAD and RCA. Trops/BNP/EKG ischemic event and heart strain. ESPINOZA score 30 (intermediate risk)/Heart score 2 (low risk). Most likely diagnosis at this point is stable angina from but w/ r/o done for possible microvascular dysfunction(predominant in females). Possible cards consult for further workup. Less likely asthma exacerbation as she was not in significant distress and no wheezing on PE.     Updates  2/23:  - Cardiology consulted. Will follow in out pt setting.  D/cheli hydrochlorothiazide, Metop Xl, Sumatriptan, Symvastatin; Started Coreg, Atorvastatin. Possible Amlodipine in future.   - TTE 2/23 w/ nl EF 60%. No wall motion abnormality  - Complaints of some cough and CT findings of mucous plugging; concern for some viremia that could lead to bacterial PNA. Mucinex added.   - Started on Augmenting for 7 days (end 3/01)  - Procal ordered     #Chest pain  #Variant Angina  ::Less likely UA/Microvascular dysfunction; EKG, Trops neg  ::Espinoza score 30 intermediate risk  ::Heart 2 score low risk  ::Trops/bnp neg  ::No hx of TTE done   ::Stress test done 3/21 w/ normal findings and evidence of ischemia at maximal workload  - Cards consulted; recs appreciated  -continue ASA 81  -switch pravastatin to high intensity atorvastatin 40  -switch her metoprolol 50 to carvedilol 25 BID  -stop hydrochlorothiazide   -if BP still allows after above recommendations, can initiate CCB amlodipine depending on BP  - consider omeprazole BID and treating GERD more aggressively  - follow up with cardiology clinic on discharge  - continue home nitroglycerin PRN     #HTN  #HLD  :: D/cheli home metoprolol succinate 50mg Daily, Symvastatin  - Started Atorvastatin 40mg at bedtime   - Started Carvedilol 25mg BID  - Possible Amlodipine start in the future     #GERD  ::Possible exarcebation  ::On Omeprazole at home  ::Reports of recent worsening  -Increased Pantoprazole 40mg BID  - Will continue Omeprazole 40mg BID at home  -  Stopped Famotidine     #Asthma  :: Recent awakenings to use rescue inhaler  :: No wheezing on PE  - C/w home Albuterol inhaler     #HA  - Hold home Sumatriptan 25mg PRN daily   - C/w Topiramate 100mg BID iso angina which could be a possible trigger      F: as needed  E: as needed  N: low Na  A: Subcute levonox     DVT PPx: Lovenox SubQ  GI PPx: Pantoprazole 40mg BID     Code Status: Full Code   Surrogate Medical Decision-maker:   Arti (Daughter)   Terrence Gannon (Boyfriend)     Stone Hughes MD

## 2024-02-23 NOTE — SIGNIFICANT EVENT
"HPI   Juju Adams is a 63 y.o. female with past medical history of hypertension, HLD, former smoker and recent diagnosis of GERD with a significant family history of cardiac disease who is admitted to the Clinical Decision Unit for chest pain.  Patient reports an acute onset of midsternal chest pain that awoke her from sleep at approximately 2:45 AM this morning.  It was about 6/10, sharp, constant, did not radiate and had not relieving or aggravating factors.  Denies fever, sob, orthopnea, pnd, bendopnea, palpitations, changes in bowel or urinary habits or leg swelling. Also denies vomiting/nausea/retching.     While in the ed: cbc/rfp/trops/bnp was negative. Received coronary CTA which showed coronary calcium score of 394 and minimal to moderate stenosis of left main, LAD and RCA. On my reevaluation, patient reports that chest pain has decreased but still present. Was admitted for cards consult concerning her high coronary calcium score.     EKG: NSR unchanged from prior      VS:  /68   Pulse 57   Temp 36.7 °C (98.1 °F) (Temporal)   Resp 18   Ht 1.6 m (5' 3\")   Wt 90.7 kg (200 lb)   SpO2 100%   BMI 35.43 kg/m²     PE:  General: awake, alert, conversant, appears stated age  HEENT: pupils equal and round, no scleral icterus or conjunctivitis  Skin: no suspect lesions or rashes noted on visible skin  Chest: ctab, normal respiratory effort, not on supplemental oxygen  Cardiac: regular rate and rhythm, normal s1, s2, no M/R/G, no JVD  Abdomen: soft, ND, NT, no involuntary guarding  : no flank pain or indwelling urinary catheter  EXT: no peripheral edema, no asymmetry noted  MSK: no focal joint swelling noted  Neuro: AOx4, moving all limbs spontaneously, follows commands  Psych: coherent thought process, appropriate mood and affect    PLAN   Juju Adams is a 63 y.o. female with past medical history of hypertension, HLD, former smoker and recent diagnosis of GERD with a significant family " history of cardiac disease who is admitted to the Clinical Decision Unit for chest pain. Trops/bnp neg with low-moderate pretest probability. ESPINOZA/Heart score low risk. Most likely diagnosis at this point is stable angina r/o possible microvascular dysfunction(predominant in females). Possible cards consult for further workup    #Stable angina  #Less likely UA/Microvascular dysfunction  ::Espinoza score low risk  ::Heart score low risk  -Trops/bnp neg  -consider cards consult in the am: for possible stress test vs cath  -continue home nitroglycerin  -will most likley start imdur tommorow  -continue home metoprolol succinate  - pt against imdur tacphylaxis  -Labs in the am    #HTN  #HLD  -Continue home metoprolol  -continue home statin    #GERD  -Possible exarcebation  -continue home famotidine  -continue home omeprazole    F as needed  E as needed  N low Na  A subcute levonox  Code status: Full code

## 2024-02-23 NOTE — CARE PLAN
The patient's goals for the shift include      The clinical goals for the shift include PT will report pain chest 3/10 during this shift    Problem: Pain  Goal: My pain/discomfort is manageable  2/23/2024 1854 by Ghada Parks RN  Outcome: Progressing  2/23/2024 1853 by Ghada Parks RN  Outcome: Progressing     Problem: Safety  Goal: Patient will be injury free during hospitalization  2/23/2024 1854 by Ghada Parks RN  Outcome: Progressing  2/23/2024 1853 by Ghada Parks RN  Outcome: Progressing  Goal: I will remain free of falls  2/23/2024 1854 by Ghada Parks RN  Outcome: Progressing  2/23/2024 1853 by Ghada Parks RN  Outcome: Progressing     Problem: Daily Care  Goal: Daily care needs are met  2/23/2024 1854 by Ghada Parks RN  Outcome: Progressing  2/23/2024 1853 by Ghada Parks RN  Outcome: Progressing     Problem: Psychosocial Needs  Goal: Demonstrates ability to cope with hospitalization/illness  2/23/2024 1854 by Ghada Parks RN  Outcome: Progressing  2/23/2024 1853 by Ghada Parks RN  Outcome: Progressing  Goal: Collaborate with me, my family, and caregiver to identify my specific goals  2/23/2024 1854 by Ghada Parks RN  Outcome: Progressing  2/23/2024 1853 by Ghada Parks RN  Outcome: Progressing     Problem: Discharge Barriers  Goal: My discharge needs are met  2/23/2024 1854 by Ghada Parks RN  Outcome: Progressing  2/23/2024 1853 by Ghada Parks RN  Outcome: Progressing     Problem: Pain  Goal: Takes deep breaths with improved pain control throughout the shift  2/23/2024 1854 by Ghada Parks RN  Outcome: Progressing  2/23/2024 1853 by Ghada Parks RN  Outcome: Progressing  Goal: Turns in bed with improved pain control throughout the shift  2/23/2024 1854 by Ghada Parks RN  Outcome: Progressing  2/23/2024 1853 by Ghada Parks RN  Outcome: Progressing  Goal: Walks with improved  pain control throughout the shift  2/23/2024 1854 by Ghada Parks RN  Outcome: Progressing  2/23/2024 1853 by Ghada Parks RN  Outcome: Progressing  Goal: Performs ADL's with improved pain control throughout shift  2/23/2024 1854 by Ghada Parks RN  Outcome: Progressing  2/23/2024 1853 by Ghada Parks RN  Outcome: Progressing  Goal: Participates in PT with improved pain control throughout the shift  2/23/2024 1854 by Ghada Parks RN  Outcome: Progressing  2/23/2024 1853 by Ghada Parks RN  Outcome: Progressing  Goal: Free from opioid side effects throughout the shift  2/23/2024 1854 by Ghada Parks RN  Outcome: Progressing  2/23/2024 1853 by Ghada Parks RN  Outcome: Progressing  Goal: Free from acute confusion related to pain meds throughout the shift  2/23/2024 1854 by Ghada Parks RN  Outcome: Progressing  2/23/2024 1853 by Ghada Parks RN  Outcome: Progressing   PT AxOx3 up ad deb, c/o pian located in mid chest pt received nitroglycerin X1 before this shift which was effective.  Pt current pain 3/10.  Pt refused Miralax and submitted drug urine which MD is aware. Pt is in bed awake watching TV.

## 2024-02-23 NOTE — CONSULTS
Inpatient consult to Cardiology  Consult performed by: Joey Isaacs MD  Consult ordered by: Meng Avery MD        History Of Present Illness:    Juju Adams is a 63 y.o. female presenting with chest pain.    Patient is a 63-year-old female with a history of hypertension, dyslipidemia, prior tobacco use, GERD who was admitted under observation for chest pain.  Her troponins were negative, EKG nonischemic and she had an echo 9/2023 with normal ejection fraction.  She had a CT coronary angiogram performed on 2/21 which showed minimal left main disease, mild LAD and RCA disease.  Overall CAC score was 394.      Cardiology consulted for elevated calcium score and chest pain.  She is on nitroglycerin at home, and is also metoprolol succinate, pravastatin.  Her echo performed today shows a perserved EF and normal WM.    At bedside and while here in hospital she has not had further episodes and feels well.  She says sometimes she does get heart burn and most recently has been on omeprazole for it.       Last Recorded Vitals:  Vitals:    02/23/24 0400 02/23/24 0626 02/23/24 0659 02/23/24 1204   BP: 112/62 115/75 (!) 127/93 121/75   BP Location:   Right arm Right arm   Patient Position:   Sitting Lying   Pulse: 78 72 87 78   Resp: 18 18 20 18   Temp: 36.7 °C (98.1 °F)  36.6 °C (97.9 °F) 36.8 °C (98.2 °F)   TempSrc: Oral  Temporal Temporal   SpO2: 99% 99% 100% 96%   Weight:       Height:           Last Labs:  CBC - 2/23/2024:  6:44 AM  5.0 13.7 215    40.7      CMP - 2/23/2024:  6:44 AM  9.7 6.1 9 --- 0.4   _ 3.6 11 72      PTT - 11/2/2023:  8:01 PM  0.9   9.8 32     Troponin I, High Sensitivity   Date/Time Value Ref Range Status   02/22/2024 09:35 AM <3 0 - 34 ng/L Final   02/21/2024 05:32 AM <3 0 - 34 ng/L Final   02/21/2024 04:32 AM <3 0 - 34 ng/L Final     BNP   Date/Time Value Ref Range Status   02/21/2024 04:32 AM 3 0 - 99 pg/mL Final   03/06/2021 05:16 PM 34 0 - 99 pg/mL Final     Comment:     .  <100  pg/mL - Heart failure unlikely  100-299 pg/mL - Intermediate probability of acute heart  .               failure exacerbation. Correlate with clinical  .               context and patient history.    >=300 pg/mL - Heart Failure likely. Correlate with clinical  .               context and patient history.  BNP testing is performed using different testing   methodology at [facility] than at other   [facility] hospitals. Direct result comparisons should   only be made within the same method.     01/17/2019 04:11 PM 12 0 - 99 pg/mL Final     Comment:     .  <100 pg/mL - Heart failure unlikely  100-299 pg/mL - Intermediate probability of acute heart  .               failure exacerbation. Correlate with clinical  .               context and patient history.    >=300 pg/mL - Heart Failure likely. Correlate with clinical  .               context and patient history.  BNP testing is performed using different testing   methodology at [facility] than at other   [facility] hospitals. Direct result comparisons should   only be made within the same method.       Hemoglobin A1C   Date/Time Value Ref Range Status   09/13/2023 10:58 AM 5.3 % Final     Comment:          Diagnosis of Diabetes-Adults   Non-Diabetic: < or = 5.6%   Increased risk for developing diabetes: 5.7-6.4%   Diagnostic of diabetes: > or = 6.5%  .       Monitoring of Diabetes                Age (y)     Therapeutic Goal (%)   Adults:          >18           <7.0   Pediatrics:    13-18           <7.5                   7-12           <8.0                   0- 6            7.5-8.5   American Diabetes Association. Diabetes Care 33(S1), Jan 2010.       VLDL   Date/Time Value Ref Range Status   09/13/2023 10:58 AM 21 0 - 40 mg/dL Final   03/19/2021 04:14 PM 19 0 - 40 mg/dL Final      Last I/O:  No intake/output data recorded.    Past Medical History:  She has a past medical history of Benign neoplasm of thyroid gland, Other specified cough (02/18/2019),  Personal history of other diseases of the respiratory system (12/26/2017), Personal history of other diseases of the respiratory system (07/31/2018), Personal history of other drug therapy (11/01/2016), and Personal history of other infectious and parasitic diseases (04/13/2015).    Past Surgical History:  She has a past surgical history that includes Total hip arthroplasty (02/13/2014) and Tubal ligation (01/04/2018).      Social History:  She reports that she has quit smoking. Her smoking use included cigarettes. She has a 20.00 pack-year smoking history. She has been exposed to tobacco smoke. She has never used smokeless tobacco. She reports that she does not currently use alcohol. She reports that she does not use drugs.    Family History:  Family History   Problem Relation Name Age of Onset    Hypertension Mother      Heart attack Mother      Hypertension Father      Heart attack Father      Stroke Father      Diabetes Sister      Hypertension Sister      Seizures Sister          Allergies:  Latex, Other, Sulfa (sulfonamide antibiotics), and Triethanolamine oleate    Inpatient Medications:  Scheduled medications   Medication Dose Route Frequency    amoxicillin-pot clavulanate  1 tablet oral q12h GISELA    aspirin  81 mg oral Daily    atorvastatin  40 mg oral Nightly    enoxaparin  40 mg subcutaneous q24h    famotidine  20 mg intravenous q12h GISELA    guaiFENesin  600 mg oral BID    hydroCHLOROthiazide  25 mg oral Daily    metoprolol succinate XL  50 mg oral Daily    pantoprazole  40 mg oral Daily before breakfast    polyethylene glycol  17 g oral Daily    topiramate  100 mg oral BID     PRN medications   Medication    acetaminophen    albuterol    nitroglycerin    ondansetron ODT    oxyCODONE     Continuous Medications   Medication Dose Last Rate     Outpatient Medications:  Current Outpatient Medications   Medication Instructions    acetaminophen (TYLENOL) 500 mg, oral, Every 4 hours PRN    acetaminophen (TYLENOL)  325 mg, oral, Every 4 hours PRN    albuterol 90 mcg/actuation inhaler 2 puffs, inhalation, Every 4 hours PRN    ALPRAZolam (Xanax) 1 mg tablet To be given by staff 30 minutes prior to procedure    ascorbic acid, vitamin C, 500 mg capsule oral    aspirin-calcium carbonate 81 mg-300 mg calcium(777 mg) tablet 1 tablet, oral, Daily    diclofenac sodium 1 % kit APPLY 2 TO 4 GRAMS EXTERNALLY TO THE AFFECTED AREA FOUR TIMES DAILY    fexofenadine (ALLEGRA ALLERGY) 180 mg, oral, Daily    hydroCHLOROthiazide (HYDRODiuril) 25 mg tablet TAKE 1 TABLET BY MOUTH EVERY DAY AS DIRECTED    ibuprofen 800 mg tablet Take one tablet night before procedure and one tablet every 8 hours post procedure as needed for pain    metoprolol succinate XL (Toprol-XL) 50 mg 24 hr tablet TAKE 1 TABLET BY MOUTH EVERY DAY    miSOPROStoL (Cytotec) 200 mcg tablet Place tablet in the vagina night before procedure    multivitamin-min-iron-FA-vit K (Bariatric Multivitamins) 45 mg iron- 800 mcg-120 mcg capsule oral    nebulizer and compressor device USE AS DIRECTED.    nitroglycerin (NITROSTAT) 0.4 mg, sublingual, Every 5 min PRN    omeprazole (PRILOSEC) 40 mg, oral, Daily, Do not crush or chew.    ondansetron ODT (ZOFRAN-ODT) 4 mg, oral, Every 8 hours PRN    simvastatin (ZOCOR) 10 mg, oral, Nightly    SUMAtriptan (IMITREX) 25 mg, oral, Once as needed    topiramate (TOPAMAX) 100 mg, oral, 2 times daily    Ventolin HFA 90 mcg/actuation inhaler 2 puffs, inhalation, Every 6 hours PRN       Physical Exam:  GEN: NAD  HEENT: ATNC,  anicteric, no JVD  CV: RRR, no r/g/m  Pulm: CTAB  Abdomen: NTND  Ext: warm, no LE edema noted  Neuro: A+Ox3  Psych: appropriate    Assessment/Plan     63-year-old female with a history of hypertension, dyslipidemia, prior tobacco use, GERD who was admitted under observation for chest pain.  Her troponins were negative, EKG nonischemic and she had an echo 9/2023 with normal ejection fraction.  She had a CT coronary angiogram performed on  2/21 which showed minimal left main disease, mild LAD and RCA disease.  Overall CAC score was 394.      She has non obstructive CAD and her pain is atypical, with hx of GERD potentially was related to heart burn. She otherwise feels well now. Recommend maximizing primary prevention therapy and medical treatment of GERD and CAD.    #atypical chest pain  #GERD  #non obstructive CAD  #DLD    Recommendations:  -continue ASA 81  -switch pravastatin to high intensity atorvastatin 40  -switch her metoprolol 50 to carvedilol 25 BID  -stop hydrochlorothiazide   -if BP still allows after above recommendations, can initiate CCB amlodipine depending on BP  -consider omeprazole BID and treating GERD more aggressively  -ok to dc from cardiology perspective  -follow up with cardiology clinic on discharge    Thank you for interesting consult.  Patient was staffed with Dr. Cheng  We will sign off at this time    General Cardiology Consult Pager: [phone] (weekday 7AM-6PM and weekend 7AM-2PM) and other: [phone]          Joey Isaacs MD

## 2024-02-23 NOTE — PROGRESS NOTES
I saw and evaluated the patient. I personally obtained the key and critical portions of the history and physical exam or was physically present for key and critical portions performed by the resident/student. I reviewed the resident/student's documentation and discussed the patient with the resident/student. I agree with the resident/student's medical decision making as documented in the note with the exception/addition of the following:      Sommer Adams is a 63 y.o. female on hospital day 0 with past medical history as per housestaff note who presented after waking up at approximately 3 AM with a crushing substernal chest pressure radiating to her right arm associated with diaphoresis, nausea and shortness of breath with possible palpitations.  No associated emesis.  She may be having intermittent subjective fevers as well as sinus drainage and congestion and a mild cough.  Denies any significant phlegm production or hemoptysis.  Patient states her pain is worse laying down but improved some sitting up.  Denies actual orthopnea or PND.  She is also has some bloating as of late in addition to the 40 pounds of weight gain although does not feel like her legs have any significant swelling/edema and that is all just weight from eating.  Of note patient did quit tobacco 20 years ago.  She does have some chronic dyspnea on exertion when going for walks.  These walks did not trigger any chest pain.  In the recent past patient has used nitroglycerin for her chest pain episodes but it gives her headache so she did not use it this time.  Patient did have stress test in 2021 and a recent echocardiogram in September 2023 which was negative for ischemia with preserved ejection fraction and no significant valvular disease i.e. aortic stenosis.  Patient denies sour taste in her mouth these episodes or any triggers for her GERD prior to going to bed.  Does not feel like her GERD and she is compliant with her  PPI/omeprazole    Objective     Exam     Vitals:    02/23/24 0400 02/23/24 0626 02/23/24 0659 02/23/24 1204   BP: 112/62 115/75 (!) 127/93 121/75   BP Location:   Right arm Right arm   Patient Position:   Sitting Lying   Pulse: 78 72 87 78   Resp: 18 18 20 18   Temp: 36.7 °C (98.1 °F)  36.6 °C (97.9 °F) 36.8 °C (98.2 °F)   TempSrc: Oral  Temporal Temporal   SpO2: 99% 99% 100% 96%   Weight:       Height:          Intake/Output last 3 shifts:  No intake/output data recorded.    Physical Exam  Vitals reviewed.   Constitutional:       General: She is not in acute distress.     Appearance: Normal appearance. She is not ill-appearing, toxic-appearing or diaphoretic.      Comments: Overweight   HENT:      Head: Normocephalic and atraumatic.      Mouth/Throat:      Mouth: Mucous membranes are moist.   Eyes:      Extraocular Movements: Extraocular movements intact.      Pupils: Pupils are equal, round, and reactive to light.   Neck:      Comments: No JVD  Cardiovascular:      Rate and Rhythm: Normal rate and regular rhythm.      Pulses: Normal pulses.      Heart sounds: No murmur heard.     No friction rub. No gallop.   Pulmonary:      Effort: Pulmonary effort is normal.      Breath sounds: Normal breath sounds. No wheezing, rhonchi or rales.   Abdominal:      General: Bowel sounds are normal. There is distension (Mild).      Palpations: Abdomen is soft.      Tenderness: There is no abdominal tenderness. There is no guarding or rebound.   Musculoskeletal:      Cervical back: No rigidity.      Right lower leg: No edema.      Left lower leg: No edema.   Skin:     General: Skin is warm and dry.   Neurological:      General: No focal deficit present.      Mental Status: She is alert and oriented to person, place, and time.   Psychiatric:         Mood and Affect: Mood normal.         Behavior: Behavior normal.            Medications   amoxicillin-pot clavulanate, 1 tablet, oral, q12h GISELA  aspirin, 81 mg, oral, Daily  enoxaparin,  "40 mg, subcutaneous, q24h  famotidine, 20 mg, intravenous, q12h GISELA  guaiFENesin, 600 mg, oral, BID  hydroCHLOROthiazide, 25 mg, oral, Daily  metoprolol succinate XL, 50 mg, oral, Daily  pantoprazole, 40 mg, oral, Daily before breakfast  polyethylene glycol, 17 g, oral, Daily  simvastatin, 10 mg, oral, Nightly  topiramate, 100 mg, oral, BID       PRN medications: acetaminophen **OR** [DISCONTINUED] acetaminophen **OR** [DISCONTINUED] acetaminophen, albuterol, nitroglycerin, ondansetron ODT, oxyCODONE       Labs     All new labs reviewed:  some of the basic labs as follows -     Results from last 7 days   Lab Units 02/23/24  0644 02/21/24  0432   WBC AUTO x10*3/uL 5.0 5.1   HEMOGLOBIN g/dL 13.7 14.7   HEMATOCRIT % 40.7 42.9   PLATELETS AUTO x10*3/uL 215 215   NEUTROS PCT AUTO % 45.5 50.9   LYMPHS PCT AUTO % 39.8 38.2   MONOS PCT AUTO % 8.5 7.5   EOS PCT AUTO % 5.4 2.4          Results from last 72 hours   Lab Units 02/23/24  0644 02/21/24  0432   SODIUM mmol/L 140 138   POTASSIUM mmol/L 3.9 3.8   CHLORIDE mmol/L 103 103   CO2 mmol/L 29 29   BUN mg/dL 17 14   CREATININE mg/dL 0.95 0.77     Results from last 72 hours   Lab Units 02/23/24  0644 02/21/24  0432   ALK PHOS U/L 72 72   AST U/L 9 13   ALT U/L 11 12   BILIRUBIN TOTAL mg/dL 0.4 0.5   ALBUMIN g/dL 3.6 4.1   PROTEIN TOTAL g/dL 6.1* 6.9     Results from last 72 hours   Lab Units 02/23/24  0644 02/21/24  0432   GLUCOSE mg/dL 92 98         No results found for: \"TR1\"  No results found for: \"URINECULTURE\", \"BLOODCULT\"         Imaging   CT angio coronary art with heartflow if score >30%  Narrative: Interpreted By:  Yoseph Perry,   STUDY:  CT ANGIO CORONARY ART WITH HEARTFLOW IF SCORE >30%;  2/22/2024 11:17  am      INDICATION:  Signs/Symptoms:Chest Pain.      COMPARISON:  CT dated 11/03/2023 and 03/06/2021      ACCESSION NUMBER(S):  [id number]      ORDERING CLINICIAN:  OSCAR CHIANG      TECHNIQUE:  Using multi-detector CT technology,  axial, sequential imaging " with  prospective gating was performed of the chest following the  intravenous administration of contrast material.  A low-osmolar  contrast agent was used (90 mL Omnipaque 350).      The patient was premedicated with  0 mg i.v metoprolol and 0.8 mg  sublingual nitroglycerin for heart rate control and coronary  dilation, respectively.      For optimization of anatomic evaluation, multiplanar reconstruction,  maximum intensity projections, and advanced 3-D off-line  postprocessing were performed on a dedicated stand-alone workstation  under the direct supervision of the interpreting physician.      CT Dose-Length Product (DLP):   458 mGy/cm  CT Dose Reduction Employed: Yes (Prospective triggering, iterative  reconstruction)      FINDINGS:  POTENTIAL STUDY LIMITATIONS:  None.      CORONARY ARTERIES:      CORONARY ARTERY CALCIUM SCORE:  Left main: 42.9  LAD: 343.4  Left circumflex: 0  RCA: 7.7      Total: 394      CORONARY ANATOMY:  There is normal origin of the coronary arteries.      LEFT MAIN CORONARY ARTERY:  The left main is normal sized vessel that  bifurcates into the LAD  and circumflex. Mixed calcified and noncalcified plaque within the  left main results in minimal (1-24%) stenosis.      LEFT ANTERIOR DESCENDING ARTERY:  The LAD is a normal size vessel that  wraps around the apex.  It gives rise to  2 acute diagonal branches.  Mixed calcified and noncalcified plaque in the proximal to mid LAD  results in mild stenosis (30-40%). Punctate calcified plaque at the  origin of the 2nd diagonal branch results in minimal stenosis.      LEFT CIRCUMFLEX ARTERY:  The LCX is a normal size vessel, which is  non-dominant.  It gives rise to  2 obtuse marginal branches. The 1st obtuse marginal  branch is a large caliber vessel. Remaining circumflex is diminutive  in size. There is no significant atherosclerotic change or stenotic  disease.      RIGHT CORONARY ARTERY:  The RCA is a normal size vessel, which is  dominant .  It  gives rise to a  conus branch,  grady branch, and  3 acute  marginal branches.  In its distal segment it bifurcates into the PDA  and PV branch. Scattered areas of mixed calcified and noncalcified  plaque results in mild stenosis (30-40%).      CARDIAC CHAMBERS:  The cardiac chambers demonstrate normal atrioventricular and  ventriculoarterial concordance, and systemic and pulmonary venous  return.      LEFT ATRIUM:  Normal size (19.3-cm2)      RIGHT ATRIUM:  Normal size (16.7-cm2)      INTERATRIAL SEPTUM:  Intact.      LEFT VENTRICLE:  Normal size (4.2-cm)      RIGHT VENTRICLE:  Normal size (4.1-cm)      AORTIC VALVE:  The aortic valve is  trileaflet in morphology.  No calcifications.      MITRAL VALVE:  No thickening/calcification.      THORACIC AORTA:  The visualized thoracic aorta is normal in course, caliber, and  contour. Mild noncalcified and calcified plaque of the visualized  aorta. There is no acute aortic pathology, such as dissection,  intramural hematoma, or contained rupture. The aortic arch is not  included on this examination.      PERICARDIUM:  There is no pericardial effusion of thickening.      CHEST:  The chest wall is normal.  No significant lymphadenopathy or mass is seen in limited images of  the mediastinum. Diffuse peribronchial thickening with mucous  plugging that is greatest in the lower lobes. Areas of  left-greater-than-right dependent atelectasis are seen. No pleural  effusion or pneumothorax. Small hiatal hernia.      UPPER ABDOMEN:  Scattered subcentimeter hypodensities in the liver are too small to  adequately characterize but are stable from prior exam and most  likely represent simple hepatic cysts. Stable nodularity of the left  adrenal gland.      Impression: 1. Nonobstructive coronary artery disease involving the left main  (minimal stenosis less than 25%), LAD and RCA (minimal to mild  stenosis 30-40%). Coronary calcium score is 394.  2. Diffuse peribronchial thickening and  mucous plugging in the lower  lobes. Correlate with clinical concern for reactive airways disease  or viral respiratory infection.  3. Small hiatal hernia.          MACRO:  None      Signed by: Yoseph Perry 2/22/2024 11:46 AM  Dictation workstation:   [id number]     No results found for this or any previous visit from the past 1095 days.     Encounter Date: 02/21/24   ECG 12 lead   Result Value    Ventricular Rate 92    Atrial Rate 92    NM Interval 164    QRS Duration 80    QT Interval 376    QTC Calculation(Bazett) 464    P Axis 77    R Axis 34    T Axis 68    QRS Count 15    Q Onset 223    P Onset 141    P Offset 203    T Offset 411    QTC Fredericia 433    Narrative    Normal sinus rhythm  Anterior infarct (cited on or before 13-NOV-2023)  Abnormal ECG  When compared with ECG of 13-NOV-2023 21:14,  QRS axis Shifted right  See ED provider note for full interpretation and clinical correlation  Confirmed by Sj Willams ([id number]) on 2/21/2024 5:40:14 AM      EKG: Normal sinus rhythm without any acute ischemic changes      Assessment and Plan     Chest pain: Questionable related to URI versus Prinzmetal's type angina versus  ?.  EKG not consistent with acute ischemia or pericarditis.  Troponins are all negative x 3.  CT coronaries shows nonobstructive disease which is reassuring  -Patient admitted for cardiology evaluation and consultation.  Await input.  May benefit from addition of calcium channel blocker  -Checking TTE.  Neither EKG nor exam consistent with a pericarditis although patient with possible viral URI  -Continue telemetry for now  -Check tox screen  -Continue metoprolol  -Continue PPI.  Avoid exacerbating GERD factors  -Continue statin  -I do not believe there is any need for heparin drip or Plavix load at this time    URI: Not improving per patient  -Trial patient on Augmentin for 1 week  -Follow-up sputum cultures if able to produce 1  -Guaifenesin and Tessalon Perles as needed    Possible COPD:  Patient with remote tobacco abuse history and chest x-ray consistent with a COPD/emphysema  -Plan for outpatient PFTs  -Continue albuterol use for asthma    Headache: This time would prefer to avoid sumatriptan should she have a migraine  -Encouraged acetaminophen use  -Can continue Topamax    DVT prophylaxis    Meng Avery MD     Of note the above was done with Dragon dictation system.  Note was proofread to minimize errors.

## 2024-02-24 ENCOUNTER — PHARMACY VISIT (OUTPATIENT)
Dept: PHARMACY | Facility: CLINIC | Age: 64
End: 2024-02-24
Payer: COMMERCIAL

## 2024-02-24 VITALS
WEIGHT: 200 LBS | SYSTOLIC BLOOD PRESSURE: 98 MMHG | HEART RATE: 88 BPM | DIASTOLIC BLOOD PRESSURE: 64 MMHG | HEIGHT: 63 IN | BODY MASS INDEX: 35.44 KG/M2 | OXYGEN SATURATION: 100 % | TEMPERATURE: 98.4 F | RESPIRATION RATE: 20 BRPM

## 2024-02-24 LAB
ALBUMIN SERPL BCP-MCNC: 3.8 G/DL (ref 3.4–5)
ALP SERPL-CCNC: 75 U/L (ref 33–136)
ALT SERPL W P-5'-P-CCNC: 11 U/L (ref 7–45)
ANION GAP SERPL CALC-SCNC: 13 MMOL/L (ref 10–20)
AST SERPL W P-5'-P-CCNC: 11 U/L (ref 9–39)
BASOPHILS # BLD AUTO: 0.04 X10*3/UL (ref 0–0.1)
BASOPHILS NFR BLD AUTO: 0.7 %
BILIRUB SERPL-MCNC: 0.6 MG/DL (ref 0–1.2)
BUN SERPL-MCNC: 15 MG/DL (ref 6–23)
CALCIUM SERPL-MCNC: 10 MG/DL (ref 8.6–10.6)
CHLORIDE SERPL-SCNC: 102 MMOL/L (ref 98–107)
CO2 SERPL-SCNC: 28 MMOL/L (ref 21–32)
CREAT SERPL-MCNC: 0.92 MG/DL (ref 0.5–1.05)
EGFRCR SERPLBLD CKD-EPI 2021: 70 ML/MIN/1.73M*2
EOSINOPHIL # BLD AUTO: 0.23 X10*3/UL (ref 0–0.7)
EOSINOPHIL NFR BLD AUTO: 4.3 %
ERYTHROCYTE [DISTWIDTH] IN BLOOD BY AUTOMATED COUNT: 12.9 % (ref 11.5–14.5)
GLUCOSE SERPL-MCNC: 88 MG/DL (ref 74–99)
HCT VFR BLD AUTO: 43 % (ref 36–46)
HGB BLD-MCNC: 13.8 G/DL (ref 12–16)
IMM GRANULOCYTES # BLD AUTO: 0.01 X10*3/UL (ref 0–0.7)
IMM GRANULOCYTES NFR BLD AUTO: 0.2 % (ref 0–0.9)
LYMPHOCYTES # BLD AUTO: 2.15 X10*3/UL (ref 1.2–4.8)
LYMPHOCYTES NFR BLD AUTO: 39.9 %
MAGNESIUM SERPL-MCNC: 2.01 MG/DL (ref 1.6–2.4)
MCH RBC QN AUTO: 30.8 PG (ref 26–34)
MCHC RBC AUTO-ENTMCNC: 32.1 G/DL (ref 32–36)
MCV RBC AUTO: 96 FL (ref 80–100)
MONOCYTES # BLD AUTO: 0.43 X10*3/UL (ref 0.1–1)
MONOCYTES NFR BLD AUTO: 8 %
NEUTROPHILS # BLD AUTO: 2.53 X10*3/UL (ref 1.2–7.7)
NEUTROPHILS NFR BLD AUTO: 46.9 %
NRBC BLD-RTO: 0 /100 WBCS (ref 0–0)
PLATELET # BLD AUTO: 228 X10*3/UL (ref 150–450)
POTASSIUM SERPL-SCNC: 3.6 MMOL/L (ref 3.5–5.3)
PROT SERPL-MCNC: 6.8 G/DL (ref 6.4–8.2)
RBC # BLD AUTO: 4.48 X10*6/UL (ref 4–5.2)
SODIUM SERPL-SCNC: 139 MMOL/L (ref 136–145)
WBC # BLD AUTO: 5.4 X10*3/UL (ref 4.4–11.3)

## 2024-02-24 PROCEDURE — 2500000002 HC RX 250 W HCPCS SELF ADMINISTERED DRUGS (ALT 637 FOR MEDICARE OP, ALT 636 FOR OP/ED)

## 2024-02-24 PROCEDURE — 99239 HOSP IP/OBS DSCHRG MGMT >30: CPT | Performed by: INTERNAL MEDICINE

## 2024-02-24 PROCEDURE — 36415 COLL VENOUS BLD VENIPUNCTURE: CPT

## 2024-02-24 PROCEDURE — 83735 ASSAY OF MAGNESIUM: CPT

## 2024-02-24 PROCEDURE — 80053 COMPREHEN METABOLIC PANEL: CPT

## 2024-02-24 PROCEDURE — 2500000001 HC RX 250 WO HCPCS SELF ADMINISTERED DRUGS (ALT 637 FOR MEDICARE OP)

## 2024-02-24 PROCEDURE — G0378 HOSPITAL OBSERVATION PER HR: HCPCS

## 2024-02-24 PROCEDURE — RXMED WILLOW AMBULATORY MEDICATION CHARGE

## 2024-02-24 PROCEDURE — 2500000004 HC RX 250 GENERAL PHARMACY W/ HCPCS (ALT 636 FOR OP/ED)

## 2024-02-24 PROCEDURE — 85025 COMPLETE CBC W/AUTO DIFF WBC: CPT

## 2024-02-24 RX ORDER — CARVEDILOL 25 MG/1
25 TABLET ORAL
Qty: 180 TABLET | Refills: 1 | Status: SHIPPED | OUTPATIENT
Start: 2024-02-24

## 2024-02-24 RX ORDER — ATORVASTATIN CALCIUM 40 MG/1
40 TABLET, FILM COATED ORAL NIGHTLY
Qty: 90 TABLET | Refills: 1 | Status: SHIPPED | OUTPATIENT
Start: 2024-02-24

## 2024-02-24 RX ORDER — OMEPRAZOLE 40 MG/1
40 CAPSULE, DELAYED RELEASE ORAL
Qty: 180 CAPSULE | Refills: 1 | Status: SHIPPED | OUTPATIENT
Start: 2024-02-24

## 2024-02-24 RX ORDER — AMOXICILLIN AND CLAVULANATE POTASSIUM 875; 125 MG/1; MG/1
1 TABLET, FILM COATED ORAL EVERY 12 HOURS SCHEDULED
Qty: 12 TABLET | Refills: 0 | Status: SHIPPED | OUTPATIENT
Start: 2024-02-24 | End: 2024-03-05 | Stop reason: WASHOUT

## 2024-02-24 RX ADMIN — CARVEDILOL 25 MG: 25 TABLET, FILM COATED ORAL at 08:57

## 2024-02-24 RX ADMIN — CARBOXYMETHYLCELLULOSE SODIUM 1 DROP: 5 SOLUTION/ DROPS OPHTHALMIC at 08:57

## 2024-02-24 RX ADMIN — CARBOXYMETHYLCELLULOSE SODIUM 1 DROP: 5 SOLUTION/ DROPS OPHTHALMIC at 00:14

## 2024-02-24 RX ADMIN — GUAIFENESIN 600 MG: 600 TABLET ORAL at 08:57

## 2024-02-24 RX ADMIN — AMOXICILLIN AND CLAVULANATE POTASSIUM 1 TABLET: 875; 125 TABLET, FILM COATED ORAL at 09:00

## 2024-02-24 RX ADMIN — ASPIRIN 81 MG 81 MG: 81 TABLET ORAL at 08:57

## 2024-02-24 RX ADMIN — PANTOPRAZOLE SODIUM 40 MG: 40 TABLET, DELAYED RELEASE ORAL at 06:45

## 2024-02-24 RX ADMIN — TOPIRAMATE 100 MG: 100 TABLET, FILM COATED ORAL at 08:58

## 2024-02-24 RX ADMIN — OXYCODONE HYDROCHLORIDE 2.5 MG: 5 TABLET ORAL at 06:46

## 2024-02-24 SDOH — SOCIAL STABILITY: SOCIAL INSECURITY: WERE YOU ABLE TO COMPLETE ALL THE BEHAVIORAL HEALTH SCREENINGS?: YES

## 2024-02-24 SDOH — SOCIAL STABILITY: SOCIAL INSECURITY: HAS ANYONE EVER THREATENED TO HURT YOUR FAMILY OR YOUR PETS?: NO

## 2024-02-24 SDOH — SOCIAL STABILITY: SOCIAL INSECURITY: ARE YOU OR HAVE YOU BEEN THREATENED OR ABUSED PHYSICALLY, EMOTIONALLY, OR SEXUALLY BY ANYONE?: NO

## 2024-02-24 SDOH — SOCIAL STABILITY: SOCIAL INSECURITY: DOES ANYONE TRY TO KEEP YOU FROM HAVING/CONTACTING OTHER FRIENDS OR DOING THINGS OUTSIDE YOUR HOME?: NO

## 2024-02-24 SDOH — SOCIAL STABILITY: SOCIAL INSECURITY: DO YOU FEEL ANYONE HAS EXPLOITED OR TAKEN ADVANTAGE OF YOU FINANCIALLY OR OF YOUR PERSONAL PROPERTY?: NO

## 2024-02-24 SDOH — SOCIAL STABILITY: SOCIAL INSECURITY: DO YOU FEEL UNSAFE GOING BACK TO THE PLACE WHERE YOU ARE LIVING?: NO

## 2024-02-24 SDOH — SOCIAL STABILITY: SOCIAL INSECURITY: ABUSE: ADULT

## 2024-02-24 SDOH — SOCIAL STABILITY: SOCIAL INSECURITY: HAVE YOU HAD THOUGHTS OF HARMING ANYONE ELSE?: NO

## 2024-02-24 SDOH — SOCIAL STABILITY: SOCIAL INSECURITY: ARE THERE ANY APPARENT SIGNS OF INJURIES/BEHAVIORS THAT COULD BE RELATED TO ABUSE/NEGLECT?: NO

## 2024-02-24 ASSESSMENT — PAIN - FUNCTIONAL ASSESSMENT
PAIN_FUNCTIONAL_ASSESSMENT: 0-10

## 2024-02-24 ASSESSMENT — COGNITIVE AND FUNCTIONAL STATUS - GENERAL
MOBILITY SCORE: 24
MOBILITY SCORE: 24
DAILY ACTIVITIY SCORE: 24
DAILY ACTIVITIY SCORE: 24
MOBILITY SCORE: 24
MOBILITY SCORE: 24
DAILY ACTIVITIY SCORE: 24
MOBILITY SCORE: 24
PATIENT BASELINE BEDBOUND: NO

## 2024-02-24 ASSESSMENT — ACTIVITIES OF DAILY LIVING (ADL)
HEARING - RIGHT EAR: FUNCTIONAL
DRESSING YOURSELF: INDEPENDENT
PATIENT'S MEMORY ADEQUATE TO SAFELY COMPLETE DAILY ACTIVITIES?: YES
JUDGMENT_ADEQUATE_SAFELY_COMPLETE_DAILY_ACTIVITIES: YES
HEARING - LEFT EAR: FUNCTIONAL
TOILETING: INDEPENDENT
LACK_OF_TRANSPORTATION: NO
WALKS IN HOME: INDEPENDENT
FEEDING YOURSELF: INDEPENDENT
ADEQUATE_TO_COMPLETE_ADL: YES
BATHING: INDEPENDENT
GROOMING: INDEPENDENT

## 2024-02-24 ASSESSMENT — PAIN SCALES - GENERAL
PAINLEVEL_OUTOF10: 10 - WORST POSSIBLE PAIN
PAINLEVEL_OUTOF10: 0 - NO PAIN

## 2024-02-24 ASSESSMENT — PATIENT HEALTH QUESTIONNAIRE - PHQ9
2. FEELING DOWN, DEPRESSED OR HOPELESS: NOT AT ALL
SUM OF ALL RESPONSES TO PHQ9 QUESTIONS 1 & 2: 0
1. LITTLE INTEREST OR PLEASURE IN DOING THINGS: NOT AT ALL

## 2024-02-24 ASSESSMENT — LIFESTYLE VARIABLES
SKIP TO QUESTIONS 9-10: 1
HOW MANY STANDARD DRINKS CONTAINING ALCOHOL DO YOU HAVE ON A TYPICAL DAY: PATIENT DOES NOT DRINK
HOW OFTEN DO YOU HAVE A DRINK CONTAINING ALCOHOL: NEVER
HOW OFTEN DO YOU HAVE 6 OR MORE DRINKS ON ONE OCCASION: NEVER
AUDIT-C TOTAL SCORE: 0
AUDIT-C TOTAL SCORE: 0

## 2024-02-24 ASSESSMENT — PAIN DESCRIPTION - LOCATION: LOCATION: BACK

## 2024-02-24 NOTE — CARE PLAN
Problem: Safety  Goal: I will remain free of falls  Outcome: Progressing     Problem: Daily Care  Goal: Daily care needs are met  Outcome: Progressing     Problem: Psychosocial Needs  Goal: Demonstrates ability to cope with hospitalization/illness  Outcome: Progressing     Problem: Pain  Goal: My pain/discomfort is manageable  Outcome: Progressing       The clinical goals for the shift include Pt BP and HR will remain WNL throughout shift. Pt will have no c/o chest pain throughout shift.

## 2024-02-24 NOTE — DISCHARGE SUMMARY
Discharge Diagnosis  Atypical chest pain  Gastroesophageal reflux disease  Hypertension    Issues Requiring Follow-Up  - Follow up with Cardiology for possible outpatient stress vs further investigation into elevated coronary calcium score   - Follow up PCP for further BP management and titration of meds   - Complete 7 day course of Augmentin for bronchitis      Test Results Pending At Discharge  Pending Labs       No current pending labs.            Hospital Course  Juju Adams is a 64 y/o F w/ a PMH of HTN, HLD, Asthma, GERD and rectal bleeding that presented to the ED w/ chest pain.  She reports that she woke from her sleep at ~02:45 and felt a pressured chest pain rated an 8/10 that she said radiated to her R arm. She was frightened, given that her sister who was one year older just passed away a month ago, and woke her BF lying next to her to call EMS. This episode was also worrying for her because she reported sweating when she woke up and has also been feeling nauseous.      En route, she noted that her BP was elevated to ~190s/90. She did not receive any medications from EMS. She reports that she has had episodes of chest pain at rest and has PRN Nitroglycerine that is prescribed by her PCP, Dr. Coombs, and has been using this for many years. She claims to have had about 4 episodes this past month that has required using it. She also complained of having to use her Albuterol inhaler about 4 times in the last month where she has been woken up from sleep w/ difficulty breathing. These have promptly resolved each time. Her only other complaints were about significant GERD as well as a ~40Ib weight gain in the last 2 years. She is currently working to control her weight with diet and exercise. She reports taking Bps at home regularly and she runs in the 120s/70s.     She states she has been sleeping with 3 pillows to prop her head up for comfort for a while now and has noticed her legs are more swollen in  the morning by the creases she sees on them on waking. She does reports feeling more tired on moderate walks than usual around the block than usual over several years. She has never been diagnosed w/ COPD or Heart Failure and reports taking her medications regularly. She works here in the hospital, at the  of the [employer], and has had no difficulties performing her ADLs and IADLS.     In the hospital, a work up for ACS was largely unremarkable but for CTA findings showing elevated coronary calcium score of 394 and mild to moderate stenosis of the LAD and RCA. A consult was made to Cardiology that reviewed the data and recommended changing her HTN meds by removing Metoprolol, Hydrochlorothiazide and adding Carvedilol. Her statin was also switched to high intensity Atorvastatin. Her ant-reflux med of Omeprazole was also planned to be BID for aggressive GERD control given reports that she had had a lot of symptomatology with it. A repeat TTE showed unchanged data when compared to her other normal one completed in November, 2023. Cardiology plan to see her in the outpatient setting to review her symptoms and blood pressure for medication titrations. She was discharged on 2/25/2024          Pertinent Physical Exam At Time of Discharge  Physical Exam    General: awake, alert, conversant, appears stated age  HEENT: pupils equal and round, no scleral icterus or conjunctivitis  Skin: no suspect lesions or rashes noted on visible skin  Chest: ctab, normal respiratory effort, not on supplemental oxygen  Cardiac: regular rate and rhythm, normal s1, s2, no M/R/G, no JVD  Abdomen: soft, ND, NT, no involuntary guarding  : no flank pain or indwelling urinary catheter  EXT: no peripheral edema, no asymmetry noted  MSK: no focal joint swelling noted  Neuro: AOx4, moving all limbs spontaneously, follows commands  Psych: coherent thought process, appropriate mood and affect    Home Medications      Medication List      START taking these medications     amoxicillin-pot clavulanate 875-125 mg tablet; Commonly known as:   Augmentin; Take 1 tablet by mouth every 12 hours for 12 doses.   atorvastatin 40 mg tablet; Commonly known as: Lipitor; Take 1 tablet (40   mg) by mouth once daily at bedtime.   carvedilol 25 mg tablet; Commonly known as: Coreg; Take 1 tablet (25 mg)   by mouth 2 times a day with meals.     CHANGE how you take these medications     acetaminophen 325 mg tablet; Commonly known as: Tylenol; What changed:   Another medication with the same name was removed. Continue taking this   medication, and follow the directions you see here.   albuterol 90 mcg/actuation inhaler; What changed: Another medication   with the same name was removed. Continue taking this medication, and   follow the directions you see here.   omeprazole 40 mg DR capsule; Commonly known as: PriLOSEC; Take 1 capsule   (40 mg) by mouth 2 times a day before meals. Do not crush or chew.; What   changed: when to take this     CONTINUE taking these medications     Allegra Allergy 180 mg tablet; Generic drug: fexofenadine   ALPRAZolam 1 mg tablet; Commonly known as: Xanax; To be given by staff   30 minutes prior to procedure   ascorbic acid (vitamin C) 500 mg capsule   aspirin-calcium carbonate 81 mg-300 mg calcium(777 mg) tablet   Bariatric Multivitamins 45 mg iron- 800 mcg-120 mcg capsule; Generic   drug: multivitamin-min-iron-FA-vit K   diclofenac sodium 1 % kit   nebulizer and compressor device   nitroglycerin 0.4 mg SL tablet; Commonly known as: Nitrostat   ondansetron ODT 4 mg disintegrating tablet; Commonly known as:   Zofran-ODT; Take 1 tablet (4 mg) by mouth every 8 hours if needed for   nausea or vomiting.   SUMAtriptan 25 mg tablet; Commonly known as: Imitrex   topiramate 100 mg tablet; Commonly known as: Topamax     STOP taking these medications     hydroCHLOROthiazide 25 mg tablet; Commonly known as: HYDRODiuril   ibuprofen  800 mg tablet   metoprolol succinate XL 50 mg 24 hr tablet; Commonly known as: Toprol-XL   miSOPROStoL 200 mcg tablet; Commonly known as: Cytotec   simvastatin 10 mg tablet; Commonly known as: Zocor       Outpatient Follow-Up  Requested outpatient Cardiology and PCP appointments     No future appointments.    Westley Franklin MD    ATTENDING:  Patient is doing well today and has no further chest pain.  Denies any shortness of breath.  Overall feels back to normal.    Physical exam:  Sitting up in bed in no distress  Lungs are clear bilaterally  CV: S1, S2 is normal; no murmurs  Abdomen is soft, nontender nondistended  Extremities without edema.    Assessment and medical decision making:  Atypical chest pain.  Cardiology input noted.  Patient does have a history of GERD and her symptoms could be consistent with that.  I recommended that she increase her PPI to twice a day for at least a week and then cut it down to once a day and continue it for at least 3 months.  In addition I suggested some lifestyle changes including eating 3 to 4 hours prior to her sleep time and changing her food habits to include 4-5 smaller meals as opposed to 3 large meals in a day.       35 minutes were spent in discharge management that included evaluation of the patient during rounds, review of labs, radiology, discharge medications and follow-up advice.

## 2024-02-24 NOTE — HOSPITAL COURSE
Juju Adams is a 64 y/o F w/ a PMH of HTN, HLD, Asthma, GERD and rectal bleeding that presented to the ED w/ chest pain.  She reports that she woke from her sleep at ~02:45 and felt a pressured chest pain rated an 8/10 that she said radiated to her R arm. She was frightened, given that her sister who was one year older just passed away a month ago, and woke her BF lying next to her to call EMS. This episode was also worrying for her because she reported sweating when she woke up and has also been feeling nauseous.      En route, she noted that her BP was elevated to ~190s/90. She did not receive any medications from EMS. She reports that she has had episodes of chest pain at rest and has PRN Nitroglycerine that is prescribed by her PCP, Dr. Coombs, and has been using this for many years. She claims to have had about 4 episodes this past month that has required using it. She also complained of having to use her Albuterol inhaler about 4 times in the last month where she has been woken up from sleep w/ difficulty breathing. These have promptly resolved each time. Her only other complaints were about significant GERD as well as a ~40Ib weight gain in the last 2 years. She is currently working to control her weight with diet and exercise. She reports taking Bps at home regularly and she runs in the 120s/70s.     She states she has been sleeping with 3 pillows to prop her head up for comfort for a while now and has noticed her legs are more swollen in the morning by the creases she sees on them on waking. She does reports feeling more tired on moderate walks than usual around the block than usual over several years. She has never been diagnosed w/ COPD or Heart Failure and reports taking her medications regularly. She works here in the hospital, at the  of the [employer], and has had no difficulties performing her ADLs and IADLS.     In the hospital, a work up for ACS was largely  unremarkable but for CTA findings showing elevated coronary calcium score of 394 and mild to moderate stenosis of the LAD and RCA. A consult was made to Cardiology that reviewed the data and recommended changing her HTN meds by removing Metoprolol, Hydrochlorothiazide and adding Carvedilol. Her statin was also switched to high intensity Atorvastatin. Her ant-reflux med of Omeprazole was also planned to be BID for aggressive GERD control given reports that she had had a lot of symptomatology with it. A repeat TTE showed unchanged data when compared to her other normal one completed in November, 2023. Cardiology plan to see her in the outpatient setting to review her symptoms and blood pressure for medication titrations. She was discharged on 2/25/2024

## 2024-02-24 NOTE — DISCHARGE INSTRUCTIONS
Good afternoon Mrs. Adams,     You were admitted [facility] after you were having some chest pain. We performed some blood tests and an EKG that did not show any evidence of heart attack or heart damage. We did consult a Cardiology team who recommended changing some medications around including your cholesterol medication ( from pravastatin to atorvastatin),  (metoprolol to carvedilol), and stopping the hydrochlorothiazide. The source of your chest pain was likely related to a GERD flare up and we therefore recommend you increasing your acid pill (omeprazole) to twice a day for at least a week to see if that helps with your symptoms. We will have you follow up with your PCP as well as a Cardiology doctor as an outpatient. Thank you for allowing us to participate in your care.     Sincerely,   [facility] Team

## 2024-02-24 NOTE — PROGRESS NOTES
2/23/24 Transitional Care Coordinator Notes:    Assessment Note: Met with patient to discuss discharge needs. Patient is a current employee with  and will need a work excuse at discharge. Patient is independent and denies any home care needs. Patient will need some [place] paperwork completed. Family will provide transportation home at discharge.    Home Care: denies  DME: denies  PCP: Dr. MARC Osuna  Pharmacy: [facility]  Falls: denies  Dialysis: denies                    Assessment/Plan   Principal Problem:    Chest pain  Active Problems:    Chest pain in adult    Discharge Plans: discharge home           Alma Metcalf RN

## 2024-02-26 ENCOUNTER — PATIENT OUTREACH (OUTPATIENT)
Dept: CARE COORDINATION | Facility: CLINIC | Age: 64
End: 2024-02-26
Payer: COMMERCIAL

## 2024-02-26 NOTE — PROGRESS NOTES
Medications  Medications reviewed with patient/caregiver?: Yes (2/26/2024  1:22 PM)  Does the patient have all medications ordered at discharge?: Yes (2/26/2024  1:22 PM)  Care Management Interventions: No intervention needed (2/26/2024  1:22 PM)  Is the patient taking all medications as directed (includes completed medication regime)?: Yes (2/26/2024  1:22 PM)    Appointments  Does the patient have a primary care provider?: Yes (2/26/2024  1:22 PM)  Care Management Interventions: Verified appointment date/time/provider (2/26/2024  1:22 PM)  Has the patient kept scheduled appointments due by today?: Not applicable (2/26/2024  1:22 PM)  Care Management Interventions: Advised to schedule with specialist (Juju will be scheduling a cardiology appt) (2/26/2024  1:22 PM)    Patient Teaching  Does the patient have access to their discharge instructions?: Yes (2/26/2024  1:22 PM)  Care Management Interventions: Reviewed instructions with patient (2/26/2024  1:22 PM)  What is the patient's perception of their health status since discharge?: Improving (2/26/2024  1:22 PM)    Wrap Up  Wrap Up Additional Comments: Juju reports she is doing well, has all her scripts filled and is taking as directed.  Has PCP appt tomorrow and will be scheduling with her cardiologist at [facility] [employer] [employer] where she works.  Plans to RTW Wednesday this week.  She also has scheduled a dietitian consult for April (2/26/2024  1:22 PM)

## 2024-02-27 ENCOUNTER — OFFICE VISIT (OUTPATIENT)
Dept: PRIMARY CARE | Facility: HOSPITAL | Age: 64
End: 2024-02-27
Payer: COMMERCIAL

## 2024-02-27 VITALS
HEIGHT: 63 IN | BODY MASS INDEX: 35.08 KG/M2 | DIASTOLIC BLOOD PRESSURE: 83 MMHG | SYSTOLIC BLOOD PRESSURE: 128 MMHG | HEART RATE: 75 BPM | WEIGHT: 198 LBS | TEMPERATURE: 97.2 F | OXYGEN SATURATION: 100 %

## 2024-02-27 DIAGNOSIS — R06.02 SOB (SHORTNESS OF BREATH): ICD-10-CM

## 2024-02-27 DIAGNOSIS — Z00.00 HEALTH CARE MAINTENANCE: Primary | ICD-10-CM

## 2024-02-27 PROCEDURE — 1036F TOBACCO NON-USER: CPT

## 2024-02-27 PROCEDURE — 99213 OFFICE O/P EST LOW 20 MIN: CPT

## 2024-02-27 PROCEDURE — 3074F SYST BP LT 130 MM HG: CPT

## 2024-02-27 PROCEDURE — 3079F DIAST BP 80-89 MM HG: CPT

## 2024-02-27 PROCEDURE — 99213 OFFICE O/P EST LOW 20 MIN: CPT | Mod: GC,GE

## 2024-02-27 ASSESSMENT — PAIN SCALES - GENERAL: PAINLEVEL: 0-NO PAIN

## 2024-02-27 ASSESSMENT — ENCOUNTER SYMPTOMS
DEPRESSION: 0
LOSS OF SENSATION IN FEET: 0
OCCASIONAL FEELINGS OF UNSTEADINESS: 0

## 2024-02-27 NOTE — LETTER
February 27, 2024     Patient: Juju Adams   YOB: 1960   Date of Visit: 2/27/2024       To Whom It May Concern:    It is my medical opinion that Juju Adams may return to full duty immediately with no restrictions.    If you have any questions or concerns, please don't hesitate to call.         Sincerely,        Evaristo Bardales MD    CC: No Recipients

## 2024-02-27 NOTE — PROGRESS NOTES
Chief complaint:    HPI:  Juju Adams is a 63 y.o. female with PMH of HTN, HLD, CAD, mild intermittent asthma, migraine headaches, renal cyst who presents to clinic for FUV after being discharged from the ED.    At her last [facility] visit 2/12/24 she was seen for GERD and started on PPI and given referral for GI and EGD. On 12/21 she went to the ED after an episode of chest pain that woke her from sleep. She was notably hypertensive but ACS workup was negative. CTA showed a coronary calcium score of 394 and mild-moderate stenosis of LAD and RCA. Cardiology was consulted and recommended replacing metop and hydrochlorothiazide with coreg. Repeat TTE was unremarkable. Her atorva was also increased. During visit her PPI was also increased to BID. Patient was started on 7 day augmentin course from bronchitis and given cardiology follow up.     Today patient reports no chest pain since changing medications in the ED. She does endorse some SOB with exertion but chest pain/tightness.  She also some swelling of feet and ankles towards the end of the day. Also says her acid reflux symptoms have improved after increasing the dose of PPI to BID in the ED. She says she was told to take twice a day for a week then go back to once a day.      Medications:    Current Outpatient Medications:     acetaminophen (Tylenol) 325 mg tablet, Take 1 tablet (325 mg) by mouth every 4 hours if needed., Disp: , Rfl:     albuterol 90 mcg/actuation inhaler, Inhale 2 puffs every 4 hours if needed., Disp: , Rfl:     ALPRAZolam (Xanax) 1 mg tablet, To be given by staff 30 minutes prior to procedure (Patient not taking: Reported on 2/24/2024), Disp: 1 tablet, Rfl: 0    amoxicillin-pot clavulanate (Augmentin) 875-125 mg tablet, Take 1 tablet by mouth every 12 hours for 12 doses., Disp: 12 tablet, Rfl: 0    ascorbic acid, vitamin C, 500 mg capsule, Take by mouth., Disp: , Rfl:     aspirin-calcium carbonate 81 mg-300 mg calcium(777 mg) tablet, Take 1  tablet by mouth once daily., Disp: , Rfl:     atorvastatin (Lipitor) 40 mg tablet, Take 1 tablet (40 mg) by mouth once daily at bedtime., Disp: 90 tablet, Rfl: 1    carvedilol (Coreg) 25 mg tablet, Take 1 tablet (25 mg) by mouth 2 times a day with meals., Disp: 180 tablet, Rfl: 1    diclofenac sodium 1 % kit, APPLY 2 TO 4 GRAMS EXTERNALLY TO THE AFFECTED AREA FOUR TIMES DAILY, Disp: , Rfl:     fexofenadine (Allegra Allergy) 180 mg tablet, Take 1 tablet (180 mg) by mouth once daily., Disp: , Rfl:     multivitamin-min-iron-FA-vit K (Bariatric Multivitamins) 45 mg iron- 800 mcg-120 mcg capsule, Take by mouth., Disp: , Rfl:     nebulizer and compressor device, USE AS DIRECTED., Disp: , Rfl:     nitroglycerin (Nitrostat) 0.4 mg SL tablet, Place 1 tablet (0.4 mg) under the tongue every 5 minutes if needed., Disp: , Rfl:     omeprazole (PriLOSEC) 40 mg DR capsule, Take 1 capsule (40 mg) by mouth 2 times a day before meals. Do not crush or chew., Disp: 180 capsule, Rfl: 1    ondansetron ODT (Zofran-ODT) 4 mg disintegrating tablet, Take 1 tablet (4 mg) by mouth every 8 hours if needed for nausea or vomiting. (Patient not taking: Reported on 2/24/2024), Disp: 30 tablet, Rfl: 0    SUMAtriptan (Imitrex) 25 mg tablet, Take 1 tablet (25 mg) by mouth 1 time if needed., Disp: , Rfl:     topiramate (Topamax) 100 mg tablet, Take 1 tablet (100 mg) by mouth 2 times a day., Disp: , Rfl:     Allergies:  Allergies   Allergen Reactions    Latex Unknown    Other Unknown    Sulfa (Sulfonamide Antibiotics) Hives and Unknown    Triethanolamine Oleate Unknown       Review of systems:  Constitutional: negative for fevers, chills, weight loss, weight gain, change in appetite, fatigue, weakness.  HEENT: negative for headache, changes in vision or hearing, congestion, sore throat.  Respiratory: negative for cough, hemoptysis, wheezing, positive for LYMAN  Cardiovascular: negative for chest pain, palpitations, orthopnea, PND  GI: negative for  dysphagia, abdominal pain, nausea, vomiting, diarrhea, constipation, melena, hematochezia, BRBPR  : negative for frequency, urgency, dysuria, hematuria, incontinence  MSK: negative for myalgia, arthralgia, decreased joint ROM, positive for LE swelling  Skin: negative for rash, wounds  Heme/lymph: negative for easy bruising, bleeding, epistaxis  Neuro: negative for LOC, numbness, tingling, tremor, vertigo, dizziness    Vitals:  There were no vitals filed for this visit.    Physical exam:  Constitutional: Well-developed female in no acute distress.  HEENT: Normocephalic, atraumatic. PERRL. EOMI. No cervical lymphadenopathy.  Respiratory: CTA bilaterally. No wheezes, rales, or rhonchi. Normal respiratory effort.  Cardiovascular: RRR. No murmurs, gallops, or rubs. No JVD. Radial pulses 2+.  Abdominal: Soft, nondistended, nontender to palpation. Bowel sounds present. No hepatosplenomegaly or masses. No CVA tenderness.  Neuro: CN II-XII intact. UE and LE strength 5/5 bilaterally and sensation intact. Normal FTN testing.  MSK: 1+ LE edema bilaterally.  Skin: Warm, dry. No rashes or wounds.  Psych: Appropriate mood and affect.    Labs:  No results found for this or any previous visit (from the past 24 hour(s)).    Imaging:  Transthoracic Echo (TTE) Complete    Result Date: 2/23/2024   [facility], [address] [place] [address]                Tel [phone] and Fax [phone] TRANSTHORACIC ECHOCARDIOGRAM REPORT  Patient Name:      MURTAZA Rojo Physician:    [id number] Pernell Sheridan MD Study Date:        2/23/2024            Ordering Provider:    [id number] TRACI PANTOJA MRN/PID:           [id number]             Fellow: Accession#:        [id number]         Nurse:                Bibi Berman RN Date of Birth/Age: 1960 / 63 years Sonographer:          Dea  Sony HEARD, DOMENICA Gender:            F                    Additional Staff: Height:            160.02 cm            Admit Date:           2/21/2024 Weight:            90.72 kg             Admission Status:     Inpatient -                                                               Routine BSA / BMI:         1.93 m2 / 35.43      Encounter#:           [id number]                    kg/m2                                         Department Location:  [facility]                                                               Invasive Blood Pressure: 121 /75 mmHg Study Type:    TRANSTHORACIC ECHO (TTE) COMPLETE Diagnosis/ICD: Other chest pain-R07.89 Indication:    Chest pain CPT Code:      Echo Complete w Full [id number] Patient History: Pertinent History: Htn, HLD, chest pain, increased calcium score. Study Detail: The following Echo studies were performed: 2D, M-Mode, Doppler and               color flow. Definity used as a contrast agent for endocardial               border definition. Total contrast used for this procedure was 2 mL               via IV push. The patient was awake.  PHYSICIAN INTERPRETATION: Left Ventricle: The left ventricular systolic function is normal, with an estimated ejection fraction of 60%. There are no regional wall motion abnormalities. The left ventricular cavity size is normal. Spectral Doppler shows a normal pattern of left ventricular diastolic filling. Left Atrium: The left atrium is normal in size. Right Ventricle: The right ventricle is normal in size. There is normal right ventricular global systolic function. Right Atrium: The right atrium is normal in size. Aortic Valve: The aortic valve is trileaflet. There is no evidence of aortic valve regurgitation. The peak instantaneous gradient of the aortic valve is 6.4 mmHg. Mitral Valve: The mitral valve is normal in structure. There is no evidence of mitral valve  regurgitation. Tricuspid Valve: The tricuspid valve is structurally normal. There is trace tricuspid regurgitation. The right ventricular systolic pressure is unable to be estimated. Pulmonic Valve: The pulmonic valve is not well visualized. The pulmonic valve regurgitation was not well visualized. Pericardium: There is a trivial pericardial effusion. Aorta: The aortic root is normal. In comparison to the previous echocardiogram(s): There are no prior studies on this patient for comparison purposes. Compared with study from 9/8/2023,.  CONCLUSIONS:  1. Left ventricular systolic function is normal with a 60% estimated ejection fraction.  2. Poorly visualized anatomical structures due to suboptimal image quality. QUANTITATIVE DATA SUMMARY: LA VOLUME:                               Normal Ranges: LA Vol A4C:        45.5 ml    (22+/-6mL/m2) LA Vol A2C:        44.6 ml LA Vol BP:         47.7 ml LA Vol Index A4C:  23.5ml/m2 LA Vol Index A2C:  23.1 ml/m2 LA Vol Index BP:   24.6 ml/m2 LA Area A4C:       15.0 cm2 LA Area A2C:       15.7 cm2 LA Major Axis A4C: 4.2 cm LA Major Axis A2C: 4.7 cm RA VOLUME BY A/L METHOD:                               Normal Ranges: RA Vol A4C:        39.3 ml    (8.3-19.5ml) RA Vol Index A4C:  20.3 ml/m2 RA Area A4C:       13.6 cm2 RA Major Axis A4C: 4.0 cm AORTA MEASUREMENTS:                    Normal Ranges: Asc Ao, d: 2.90 cm (2.1-3.4cm) LV DIASTOLIC FUNCTION:                               Normal Ranges: MV Peak E:        0.87 m/s    (0.7-1.2 m/s) MV Peak A:        0.84 m/s    (0.42-0.7 m/s) E/A Ratio:        1.04        (1.0-2.2) MV e'             0.08 m/s    (>8.0) MV lateral e'     0.08 m/s MV medial e'      0.07 m/s MV A Dur:         98.00 msec E/e' Ratio:       10.88       (<8.0) a'                0.12 m/s PulmV Sys Brodie:    51.00 cm/s PulmV De Jesus Brodie:   32.60 cm/s PulmV S/D Brodie:    1.60 PulmV A Revs Brodie: 44.10 cm/s PulmV A Revs Dur: 103.00 msec MITRAL VALVE:                 Normal Ranges: MV  DT: 246 msec (150-240msec) AORTIC VALVE:                         Normal Ranges: AoV Vmax:      1.26 m/s (<=1.7m/s) AoV Peak P.4 mmHg (<20mmHg) LVOT Max Brodie:  1.03 m/s (<=1.1m/s) LVOT VTI:      17.90 cm LVOT Diameter: 2.20 cm  (1.8-2.4cm) AoV Area,Vmax: 3.11 cm2 (2.5-4.5cm2)  RIGHT VENTRICLE: RV Basal 3.80 cm RV Mid   1.80 cm RV Major 7.0 cm TAPSE:   18.1 mm RV s'    0.14 m/s PULMONIC VALVE:                      Normal Ranges: PV Max Brodie: 1.1 m/s  (0.6-0.9m/s) PV Max P.4 mmHg Pulmonary Veins: PulmV A Revs Dur: 103.00 msec PulmV A Revs Brodie: 44.10 cm/s PulmV De Jesus Brodie:   32.60 cm/s PulmV S/D Brodie:    1.60 PulmV Sys Brodie:    51.00 cm/s  [id number] Pernell Sheridan MD Electronically signed on 2024 at 5:07:56 PM  ** Final **     CT angio coronary art with heartflow if score >30%    Result Date: 2024  Interpreted By:  Yoseph Perry, STUDY: CT ANGIO CORONARY ART WITH HEARTFLOW IF SCORE >30%;  2024 11:17 am   INDICATION: Signs/Symptoms:Chest Pain.   COMPARISON: CT dated 2023 and 2021   ACCESSION NUMBER(S): [id number]   ORDERING CLINICIAN: OSCAR CHIANG   TECHNIQUE: Using multi-detector CT technology,  axial, sequential imaging with prospective gating was performed of the chest following the intravenous administration of contrast material.  A low-osmolar contrast agent was used (90 mL Omnipaque 350).   The patient was premedicated with  0 mg i.v metoprolol and 0.8 mg sublingual nitroglycerin for heart rate control and coronary dilation, respectively.   For optimization of anatomic evaluation, multiplanar reconstruction, maximum intensity projections, and advanced 3-D off-line postprocessing were performed on a dedicated stand-alone workstation under the direct supervision of the interpreting physician.   CT Dose-Length Product (DLP):   458 mGy/cm CT Dose Reduction Employed: Yes (Prospective triggering, iterative reconstruction)   FINDINGS: POTENTIAL STUDY LIMITATIONS:  None.   CORONARY ARTERIES:    CORONARY ARTERY CALCIUM SCORE: Left main: 42.9 LAD: 343.4 Left circumflex: 0 RCA: 7.7   Total: 394   CORONARY ANATOMY: There is normal origin of the coronary arteries.   LEFT MAIN CORONARY ARTERY: The left main is normal sized vessel that  bifurcates into the LAD and circumflex. Mixed calcified and noncalcified plaque within the left main results in minimal (1-24%) stenosis.   LEFT ANTERIOR DESCENDING ARTERY: The LAD is a normal size vessel that  wraps around the apex. It gives rise to  2 acute diagonal branches. Mixed calcified and noncalcified plaque in the proximal to mid LAD results in mild stenosis (30-40%). Punctate calcified plaque at the origin of the 2nd diagonal branch results in minimal stenosis.   LEFT CIRCUMFLEX ARTERY: The LCX is a normal size vessel, which is  non-dominant. It gives rise to  2 obtuse marginal branches. The 1st obtuse marginal branch is a large caliber vessel. Remaining circumflex is diminutive in size. There is no significant atherosclerotic change or stenotic disease.   RIGHT CORONARY ARTERY: The RCA is a normal size vessel, which is  dominant . It gives rise to a  conus branch,  grady branch, and  3 acute marginal branches.  In its distal segment it bifurcates into the PDA and PV branch. Scattered areas of mixed calcified and noncalcified plaque results in mild stenosis (30-40%).   CARDIAC CHAMBERS: The cardiac chambers demonstrate normal atrioventricular and ventriculoarterial concordance, and systemic and pulmonary venous return.   LEFT ATRIUM: Normal size (19.3-cm2)   RIGHT ATRIUM: Normal size (16.7-cm2)   INTERATRIAL SEPTUM: Intact.   LEFT VENTRICLE: Normal size (4.2-cm)   RIGHT VENTRICLE: Normal size (4.1-cm)   AORTIC VALVE: The aortic valve is  trileaflet in morphology.  No calcifications.   MITRAL VALVE: No thickening/calcification.   THORACIC AORTA: The visualized thoracic aorta is normal in course, caliber, and contour. Mild noncalcified and calcified plaque of the  visualized aorta. There is no acute aortic pathology, such as dissection, intramural hematoma, or contained rupture. The aortic arch is not included on this examination.   PERICARDIUM: There is no pericardial effusion of thickening.   CHEST: The chest wall is normal. No significant lymphadenopathy or mass is seen in limited images of the mediastinum. Diffuse peribronchial thickening with mucous plugging that is greatest in the lower lobes. Areas of left-greater-than-right dependent atelectasis are seen. No pleural effusion or pneumothorax. Small hiatal hernia.   UPPER ABDOMEN: Scattered subcentimeter hypodensities in the liver are too small to adequately characterize but are stable from prior exam and most likely represent simple hepatic cysts. Stable nodularity of the left adrenal gland.       1. Nonobstructive coronary artery disease involving the left main (minimal stenosis less than 25%), LAD and RCA (minimal to mild stenosis 30-40%). Coronary calcium score is 394. 2. Diffuse peribronchial thickening and mucous plugging in the lower lobes. Correlate with clinical concern for reactive airways disease or viral respiratory infection. 3. Small hiatal hernia.     MACRO: None   Signed by: Yoseph Perry 2/22/2024 11:46 AM Dictation workstation:   [id number]    ECG 12 lead    Result Date: 2/21/2024  Normal sinus rhythm Anterior infarct (cited on or before 13-NOV-2023) Abnormal ECG When compared with ECG of 13-NOV-2023 21:14, QRS axis Shifted right See ED provider note for full interpretation and clinical correlation Confirmed by Sj Willams ([id number]) on 2/21/2024 5:40:14 AM    XR chest 2 views    Result Date: 2/21/2024  Interpreted By:  Sunshine Chambers, STUDY: XR CHEST 2 VIEWS;  2/21/2024 4:56 am   INDICATION: Signs/Symptoms:Chest Pain.   COMPARISON: 11/13/2023   ACCESSION NUMBER(S): [id number]   ORDERING CLINICIAN: PINEDA SIMMS   FINDINGS: PA and lateral radiographs of the chest were provided.        CARDIOMEDIASTINAL SILHOUETTE: Cardiomediastinal silhouette is normal in size and configuration.   LUNGS: Lungs are mildly hyperinflated. Left basilar atelectasis or scarring. No focal consolidation, pleural effusion or sizable pneumothorax seen.   ABDOMEN: No remarkable upper abdominal findings.   BONES: No acute osseous changes.       1.  Findings suggestive of emphysema or  D. No focal consolidation.       MACRO: None   Signed by: Sunshine Chambers 2/21/2024 5:00 AM Dictation workstation:   [id number]      Assessment and plan:  63 year old female with PMH of HTN, HLD, mild intermittent asthma, migraine headaches, renal cyst who presents to clinic for FUV after being discharged from the ED for chest pain, ACS workup negative however CTA showed significant non-obstructive CAD. Patient also needs return to work paperwork.      #GERD  - cw omeprazole 40 BID, can decrease to once a day in a week if tolerated  - referral to GI for consideration of EGD in setting of new onset GERD  - referral to dietitian for weight loss  - counseled on minimizing acidic food, eating close to bedtime     #Chest pain  #CAD  - Occasional substernal chest pains 1-2 times/year for past 2 years, relieved by nitroglycerine  - [facility] 3/2013: mild diffuse coronary disease, normal LV pressures  - Stress echo (3/2021): resting EF 60-65%, stress EF > 75%, normal stress echo  - Last EKG 2021: NSR, no abnormalities  - CTA 2/21/24 with mild-moderate RCA and LAD stenosis and CT score of 394  Plan:  - Sublingual Nitroglycerine PRN  - Has cardiology appointment in April for further workup  - On atorva 40/aspirin 81     #HLD  -Last Lipid panel (9/2023): TChol 241, HDL 67.6, ,   -ASCVD >20%  Plan:  -C/w atorva 40mg  -Can re-check lipid panel in 3 months after atorva increase     #HTN  -controlled in office today and at home  Plan:  -instructed to maintain daily BP log  - Continue coreg 25mg BID     #Migraines  -Significantly improved, has 1  migraine every 1-2 months  Plan:  -Continue Sumatriptan 25 mg PRN,   - Stop topiramate 100 BID because patient is not taking and migranes are less than once per month       # Health maintenance  Last HbA1c: 5.3 in 9/23  Infectious: Hep C, HIV Not addressed  Vaccinations: Not addressed  Colonoscopy: colonoscopy in 11/23, repeat in 7 years  Pap testing last done 11/23 normal, no longer needed, mammogram has referral for mammo  Low-dose CT chest: Not indicated  Bone density: Revisit at 64  AAA screening: Not indicated    Follow-up in 6 months.    Patient and plan discussed with attending physician, Dr. Navarrete.    Evaristo Bardales MD  PGY-1 Internal Medicine  [facility]  I reviewed the resident/fellow's documentation and saw and discussed the patient with the resident/fellow. I agree with the resident/fellow's medical decision making as documented in the note.    ATTENDING TEMPLATE [facility]  NAME: Juju Adams  HPI:  63 year old here after DC from ER with chest pressure woke her in the middle of the night, diaphoretic, called EMS BP systolic 190s.  Her coronary CTA should mild moderate coronary artery disease in LAD, RCA, mild CX with total score 390s.  Cardiology saw her and switched her to metoprolol BID and ER docs switched her PPI.  Also CT scan noted mucus in the bronchus and she started her on augmentin 7 day course.  Also atorvastatin was increased.  She also notes some SOB with exertion.  She wants a letter regarding clearance to return to work  PMX:  HTN, hyperlipidemia, migraine, allergic asthma, GERD  MEDS: atorvastatin 40 mg, coreg 25 mg BID, omeprazole 40 mg BID then to drop to daily, Topamax, sumatriptan, nitroglycerin, allegra, aspirin 81 mg,   ALLERGIES:   SOC HX: works at the [employer] HX:    PE:  128/83, 75, trace pitting edema bilateral ankles there end of the day  RECOMMEND:  1.  Discussed results of CT scan and mucus plugs and evidence of broncho mucus plugging. She  already has albuterol and we will send her for formal PFT and to pulmonary to address  2.  Following with GI already for her GERD.  On omeprazole  3.  Dr. Bardales cleaned up her med list  4.  Reviewed prevention and screening issues  5.  Return to work note written    Corrine Navarrete MD

## 2024-02-27 NOTE — PATIENT INSTRUCTIONS
As discussed today, our plan is:     Medication changes: We talked about how your medication Topamax is not a medication to take as needed and because you get migraines so infrequently without taking this daily, we can stop it and you can just continue your sumatriptan as needed  Consultations - you were referred to see the following specialists: You have an appointment with cardiology in April, it is important to go to this appointment. You also have a referral to see the GI doctor for your GERD, please schedule this at your convenience  Your cancer screenings: We talked about not needed pap smears moving forward given you have no new risk factors and your last one was normal. Your colonoscopy was normal and you need a repeat in 7 years. You have a referral for a mammogram so please schedule this at your convenience.    Please come back to see me in: 6 months  ------  If you have any problems or questions, please contact the clinic at [phone] to leave a message. Our fax number is [phone]. If your issue cannot wait until the next business day, please go to urgent care or the emergency department.     I also strongly urge all of my patients to register for [facility] by going to: [email or web address]  (The  staff can also send you a text/email link to register when you check out).    No shows: It is understandable if you are unable to make it to a visit, but please cancel your appointment instead of not showing up. This helps to give other patients access to primary care and keeps wait times low.      Dr. Evaristo Bardales

## 2024-03-02 NOTE — PROGRESS NOTES
I saw and evaluated the patient. I personally obtained the key and critical portions of the history and physical exam or was physically present for key and critical portions performed by the resident/fellow. I reviewed the resident/fellow's documentation and discussed the patient with the resident/fellow. I agree with the resident/fellow's medical decision making as documented in the note.    ATTENDING TEMPLATE C  NAME: Juju Adams  HPI:  63 year old here after DC from ER with chest pressure woke her in the middle of the night, diaphoretic, called EMS BP systolic 190s.  Her coronary CTA should mild moderate coronary artery disease in LAD, RCA, mild CX with total score 390s.  Cardiology saw her and switched her to metoprolol BID and ER docs switched her PPI.  Also CT scan noted mucus in the bronchus and she started her on augmentin 7 day course.  Also atorvastatin was increased.  She also notes some SOB with exertion.  She wants a letter regarding clearance to return to work  PMX:  HTN, hyperlipidemia, migraine, allergic asthma, GERD  MEDS: atorvastatin 40 mg, coreg 25 mg BID, omeprazole 40 mg BID then to drop to daily, Topamax, sumatriptan, nitroglycerin, allegra, aspirin 81 mg,   ALLERGIES:   SOC HX: works at the [employer] HX:    PE:  128/83, 75, trace pitting edema bilateral ankles there end of the day  RECOMMEND:  1.  Discussed results of CT scan and mucus plugs and evidence of broncho mucus plugging. She already has albuterol and we will send her for formal PFT and to pulmonary to address  2.  Following with GI already for her GERD.  On omeprazole  3.  Dr. Bardales cleaned up her med list  4.  Reviewed prevention and screening issues  5.  Return to work note written  Corrine Navarrete MD

## 2024-03-05 ENCOUNTER — HOSPITAL ENCOUNTER (OUTPATIENT)
Facility: HOSPITAL | Age: 64
Setting detail: OBSERVATION
Discharge: HOME | End: 2024-03-05
Attending: INTERNAL MEDICINE | Admitting: EMERGENCY MEDICINE
Payer: COMMERCIAL

## 2024-03-05 ENCOUNTER — CLINICAL SUPPORT (OUTPATIENT)
Dept: EMERGENCY MEDICINE | Facility: HOSPITAL | Age: 64
End: 2024-03-05
Payer: COMMERCIAL

## 2024-03-05 ENCOUNTER — APPOINTMENT (OUTPATIENT)
Dept: RADIOLOGY | Facility: HOSPITAL | Age: 64
End: 2024-03-05
Payer: COMMERCIAL

## 2024-03-05 VITALS
TEMPERATURE: 97.7 F | OXYGEN SATURATION: 100 % | HEIGHT: 63 IN | HEART RATE: 70 BPM | SYSTOLIC BLOOD PRESSURE: 130 MMHG | DIASTOLIC BLOOD PRESSURE: 78 MMHG | WEIGHT: 190 LBS | RESPIRATION RATE: 18 BRPM | BODY MASS INDEX: 33.66 KG/M2

## 2024-03-05 DIAGNOSIS — R07.9 CHEST PAIN, UNSPECIFIED TYPE: Primary | ICD-10-CM

## 2024-03-05 DIAGNOSIS — R07.9 CHEST PAIN IN ADULT: ICD-10-CM

## 2024-03-05 LAB
ALBUMIN SERPL BCP-MCNC: 3.7 G/DL (ref 3.4–5)
ALP SERPL-CCNC: 74 U/L (ref 33–136)
ALT SERPL W P-5'-P-CCNC: 11 U/L (ref 7–45)
ANION GAP SERPL CALC-SCNC: 9 MMOL/L (ref 10–20)
AST SERPL W P-5'-P-CCNC: 14 U/L (ref 9–39)
BASOPHILS # BLD AUTO: 0.03 X10*3/UL (ref 0–0.1)
BASOPHILS NFR BLD AUTO: 0.5 %
BILIRUB SERPL-MCNC: 0.7 MG/DL (ref 0–1.2)
BNP SERPL-MCNC: 19 PG/ML (ref 0–99)
BUN SERPL-MCNC: 10 MG/DL (ref 6–23)
CALCIUM SERPL-MCNC: 9.8 MG/DL (ref 8.6–10.6)
CARDIAC TROPONIN I PNL SERPL HS: <3 NG/L (ref 0–34)
CARDIAC TROPONIN I PNL SERPL HS: <3 NG/L (ref 0–34)
CHLORIDE SERPL-SCNC: 100 MMOL/L (ref 98–107)
CO2 SERPL-SCNC: 28 MMOL/L (ref 21–32)
CREAT SERPL-MCNC: 0.74 MG/DL (ref 0.5–1.05)
EGFRCR SERPLBLD CKD-EPI 2021: 90 ML/MIN/1.73M*2
EOSINOPHIL # BLD AUTO: 0.06 X10*3/UL (ref 0–0.7)
EOSINOPHIL NFR BLD AUTO: 0.9 %
ERYTHROCYTE [DISTWIDTH] IN BLOOD BY AUTOMATED COUNT: 12.6 % (ref 11.5–14.5)
FLUAV RNA RESP QL NAA+PROBE: NOT DETECTED
FLUBV RNA RESP QL NAA+PROBE: NOT DETECTED
GLUCOSE SERPL-MCNC: 115 MG/DL (ref 74–99)
HCT VFR BLD AUTO: 37.9 % (ref 36–46)
HGB BLD-MCNC: 13 G/DL (ref 12–16)
IMM GRANULOCYTES # BLD AUTO: 0.01 X10*3/UL (ref 0–0.7)
IMM GRANULOCYTES NFR BLD AUTO: 0.2 % (ref 0–0.9)
LYMPHOCYTES # BLD AUTO: 1.42 X10*3/UL (ref 1.2–4.8)
LYMPHOCYTES NFR BLD AUTO: 21.7 %
MAGNESIUM SERPL-MCNC: 1.63 MG/DL (ref 1.6–2.4)
MCH RBC QN AUTO: 30.3 PG (ref 26–34)
MCHC RBC AUTO-ENTMCNC: 34.3 G/DL (ref 32–36)
MCV RBC AUTO: 88 FL (ref 80–100)
MONOCYTES # BLD AUTO: 0.37 X10*3/UL (ref 0.1–1)
MONOCYTES NFR BLD AUTO: 5.7 %
NEUTROPHILS # BLD AUTO: 4.64 X10*3/UL (ref 1.2–7.7)
NEUTROPHILS NFR BLD AUTO: 71 %
NRBC BLD-RTO: 0 /100 WBCS (ref 0–0)
PLATELET # BLD AUTO: 203 X10*3/UL (ref 150–450)
POTASSIUM SERPL-SCNC: 3.4 MMOL/L (ref 3.5–5.3)
PROT SERPL-MCNC: 6.4 G/DL (ref 6.4–8.2)
RBC # BLD AUTO: 4.29 X10*6/UL (ref 4–5.2)
SARS-COV-2 RNA RESP QL NAA+PROBE: NOT DETECTED
SODIUM SERPL-SCNC: 134 MMOL/L (ref 136–145)
WBC # BLD AUTO: 6.5 X10*3/UL (ref 4.4–11.3)

## 2024-03-05 PROCEDURE — 36415 COLL VENOUS BLD VENIPUNCTURE: CPT

## 2024-03-05 PROCEDURE — 2500000005 HC RX 250 GENERAL PHARMACY W/O HCPCS

## 2024-03-05 PROCEDURE — 99285 EMERGENCY DEPT VISIT HI MDM: CPT | Mod: 25

## 2024-03-05 PROCEDURE — 99285 EMERGENCY DEPT VISIT HI MDM: CPT | Performed by: EMERGENCY MEDICINE

## 2024-03-05 PROCEDURE — 84484 ASSAY OF TROPONIN QUANT: CPT

## 2024-03-05 PROCEDURE — 93010 ELECTROCARDIOGRAM REPORT: CPT | Performed by: EMERGENCY MEDICINE

## 2024-03-05 PROCEDURE — 83735 ASSAY OF MAGNESIUM: CPT

## 2024-03-05 PROCEDURE — 99223 1ST HOSP IP/OBS HIGH 75: CPT | Performed by: STUDENT IN AN ORGANIZED HEALTH CARE EDUCATION/TRAINING PROGRAM

## 2024-03-05 PROCEDURE — 71046 X-RAY EXAM CHEST 2 VIEWS: CPT

## 2024-03-05 PROCEDURE — 93971 EXTREMITY STUDY: CPT | Performed by: STUDENT IN AN ORGANIZED HEALTH CARE EDUCATION/TRAINING PROGRAM

## 2024-03-05 PROCEDURE — G0378 HOSPITAL OBSERVATION PER HR: HCPCS

## 2024-03-05 PROCEDURE — 85025 COMPLETE CBC W/AUTO DIFF WBC: CPT | Performed by: INTERNAL MEDICINE

## 2024-03-05 PROCEDURE — 2500000001 HC RX 250 WO HCPCS SELF ADMINISTERED DRUGS (ALT 637 FOR MEDICARE OP)

## 2024-03-05 PROCEDURE — 83880 ASSAY OF NATRIURETIC PEPTIDE: CPT | Performed by: INTERNAL MEDICINE

## 2024-03-05 PROCEDURE — 93005 ELECTROCARDIOGRAM TRACING: CPT

## 2024-03-05 PROCEDURE — 93970 EXTREMITY STUDY: CPT

## 2024-03-05 PROCEDURE — 87636 SARSCOV2 & INF A&B AMP PRB: CPT

## 2024-03-05 PROCEDURE — 80053 COMPREHEN METABOLIC PANEL: CPT | Performed by: INTERNAL MEDICINE

## 2024-03-05 PROCEDURE — 71046 X-RAY EXAM CHEST 2 VIEWS: CPT | Mod: FOREIGN READ | Performed by: RADIOLOGY

## 2024-03-05 RX ORDER — ONDANSETRON 4 MG/1
4 TABLET, FILM COATED ORAL EVERY 8 HOURS PRN
COMMUNITY
End: 2024-03-19 | Stop reason: WASHOUT

## 2024-03-05 RX ORDER — ACETAMINOPHEN 325 MG/1
975 TABLET ORAL ONCE
Status: COMPLETED | OUTPATIENT
Start: 2024-03-05 | End: 2024-03-05

## 2024-03-05 RX ORDER — ASPIRIN 81 MG/1
81 TABLET ORAL DAILY
COMMUNITY

## 2024-03-05 RX ORDER — AMOXICILLIN 250 MG
2 CAPSULE ORAL 2 TIMES DAILY
Status: DISCONTINUED | OUTPATIENT
Start: 2024-03-05 | End: 2024-03-05

## 2024-03-05 RX ORDER — POTASSIUM CHLORIDE 1.5 G/1.58G
40 POWDER, FOR SOLUTION ORAL ONCE
Status: COMPLETED | OUTPATIENT
Start: 2024-03-05 | End: 2024-03-05

## 2024-03-05 RX ORDER — ASPIRIN 325 MG
325 TABLET ORAL ONCE
Status: COMPLETED | OUTPATIENT
Start: 2024-03-05 | End: 2024-03-05

## 2024-03-05 RX ORDER — ONDANSETRON 4 MG/1
4 TABLET, ORALLY DISINTEGRATING ORAL ONCE
Status: COMPLETED | OUTPATIENT
Start: 2024-03-05 | End: 2024-03-05

## 2024-03-05 RX ORDER — TOPIRAMATE 100 MG/1
100 TABLET, FILM COATED ORAL DAILY
COMMUNITY

## 2024-03-05 RX ORDER — ENOXAPARIN SODIUM 100 MG/ML
40 INJECTION SUBCUTANEOUS EVERY 24 HOURS
Status: DISCONTINUED | OUTPATIENT
Start: 2024-03-05 | End: 2024-03-05

## 2024-03-05 RX ADMIN — ONDANSETRON 4 MG: 4 TABLET, ORALLY DISINTEGRATING ORAL at 10:40

## 2024-03-05 RX ADMIN — ACETAMINOPHEN 975 MG: 325 TABLET ORAL at 10:40

## 2024-03-05 RX ADMIN — ASPIRIN 325 MG ORAL TABLET 325 MG: 325 PILL ORAL at 10:40

## 2024-03-05 RX ADMIN — POTASSIUM CHLORIDE 40 MEQ: 1.5 POWDER, FOR SOLUTION ORAL at 11:23

## 2024-03-05 ASSESSMENT — LIFESTYLE VARIABLES
HAVE YOU EVER FELT YOU SHOULD CUT DOWN ON YOUR DRINKING: NO
EVER FELT BAD OR GUILTY ABOUT YOUR DRINKING: NO
EVER HAD A DRINK FIRST THING IN THE MORNING TO STEADY YOUR NERVES TO GET RID OF A HANGOVER: NO
HAVE PEOPLE ANNOYED YOU BY CRITICIZING YOUR DRINKING: NO

## 2024-03-05 ASSESSMENT — PAIN SCALES - GENERAL
PAINLEVEL_OUTOF10: 0 - NO PAIN
PAINLEVEL_OUTOF10: 7

## 2024-03-05 ASSESSMENT — PAIN - FUNCTIONAL ASSESSMENT
PAIN_FUNCTIONAL_ASSESSMENT: 0-10
PAIN_FUNCTIONAL_ASSESSMENT: 0-10

## 2024-03-05 ASSESSMENT — COLUMBIA-SUICIDE SEVERITY RATING SCALE - C-SSRS
2. HAVE YOU ACTUALLY HAD ANY THOUGHTS OF KILLING YOURSELF?: NO
1. IN THE PAST MONTH, HAVE YOU WISHED YOU WERE DEAD OR WISHED YOU COULD GO TO SLEEP AND NOT WAKE UP?: NO
6. HAVE YOU EVER DONE ANYTHING, STARTED TO DO ANYTHING, OR PREPARED TO DO ANYTHING TO END YOUR LIFE?: NO

## 2024-03-05 ASSESSMENT — PAIN DESCRIPTION - LOCATION: LOCATION: CHEST

## 2024-03-05 ASSESSMENT — PAIN DESCRIPTION - PAIN TYPE: TYPE: ACUTE PAIN

## 2024-03-05 NOTE — PROGRESS NOTES
Handoff Note    I received Juju Adams in signout from Dr. Mayo.  Please see the previous note for all HPI, PE and MDM up to the time of signout at 1500.    In brief Juju Adams is an 63 y.o. female presenting for   Chief Complaint   Patient presents with    Chest Pain   .      At the time of signout, this patient had been admitted to the CDU for provocative cardiac testing.  Cardiology fellow evaluated the patient at bedside, and after discussion with the attending cardiologist, agreed that the patient is stable for discharge and outpatient management.  Discussion had with the patient with the cardiology team.  She has a cardiology appointment set up for 4/3 which she should keep.  Disposition changed from sent to CDU to discharge.  She remained hemodynamically stable and discharged home.      Throughout the ED stay, the patient was monitored and re-examined for any changes in stability or symptomatology.  The patient was instructed to return to the ED if any symptoms recurred, worsened, or if there was any additional concerns.    ED Course:   Diagnoses as of 03/05/24 1605   Chest pain, unspecified type         Patient seen by and discussed with attending emergency medicine physician, Dr. Chauhan    Pt Disposition: Discharge    Procedures      Rikcey Paz DO  Emergency Medicine PGY-2  [facility]

## 2024-03-05 NOTE — ED TRIAGE NOTES
Pt to ed with c/o CP down R shoulder/arm x this morning. Pt states she was recently hospitalized for similar and dx with CAD and COPD. Pt states she was sitting at the table eating when the pain started this time. Pt compliant on meidcations.

## 2024-03-05 NOTE — CONSULTS
Inpatient consult to cardiology  Consult performed by: Joey Isaacs MD  Consult ordered by: Ramin Pugh PA-C        History Of Present Illness:    Juju Adams is a 63 y.o. female presenting with leg cramping, nausea, right sided check and shoulder pain.    Patient is a 63-year-old female with a history of GERD, hypertension, dyslipidemia who is presenting to the ED for chest pain.  She was recently discharged from the hospital with atypical chest pain.  She had non obstructive CAD with majority of calcium in her LAD but resulted in mild stenosis. Ultimately coreg was started, asa 81, statin, and her ppi was doubled for a short period.     Today she said she woke up at 5 AM not feeling well. Mainly with leg cramping, nausea, and right sided chest pressure. ECG unchanged from prior admission, HS trop negative x 2, BNP wnls.   Otherwise hypokalemic and hypoMg.  No actual emesis, no diarrhea. No exertional chest pain.        2/22/2024 CT coronary angio:  IMPRESSION:  1. Nonobstructive coronary artery disease involving the left main  (minimal stenosis less than 25%), LAD and RCA (minimal to mild  stenosis 30-40%). Coronary calcium score is 394.  2. Diffuse peribronchial thickening and mucous plugging in the lower  lobes. Correlate with clinical concern for reactive airways disease  or viral respiratory infection.  3. Small hiatal hernia.      2/23/2024 TTE:  CONCLUSIONS:   1. Left ventricular systolic function is normal with a 60% estimated ejection fraction.   2. Poorly visualized anatomical structures due to suboptimal image quality.      Last Recorded Vitals:  Vitals:    03/05/24 1430 03/05/24 1527 03/05/24 1528 03/05/24 1538   BP: (!) 164/98 137/82  150/80   BP Location:  Right arm  Right arm   Patient Position:  Lying  Lying   Pulse: 71 63  60   Resp: 11 16  19   Temp:    36.5 °C (97.7 °F)   TempSrc:    Temporal   SpO2:  100% 100% 100%   Weight:       Height:           Last Labs:  CBC - 3/5/2024:  9:46  "AM  6.5 13.0 203    37.9      CMP - 3/5/2024:  9:46 AM  9.8 6.4 14 --- 0.7   _ 3.7 11 74      PTT - 11/2/2023:  8:01 PM  0.9   9.8 32     Troponin I, High Sensitivity   Date/Time Value Ref Range Status   03/05/2024 10:49 AM <3 0 - 34 ng/L Final   03/05/2024 09:46 AM <3 0 - 34 ng/L Final   02/22/2024 09:35 AM <3 0 - 34 ng/L Final     BNP   Date/Time Value Ref Range Status   03/05/2024 09:46 AM 19 0 - 99 pg/mL Final   02/21/2024 04:32 AM 3 0 - 99 pg/mL Final     Hemoglobin A1C   Date/Time Value Ref Range Status   09/13/2023 10:58 AM 5.3 % Final     Comment:          Diagnosis of Diabetes-Adults   Non-Diabetic: < or = 5.6%   Increased risk for developing diabetes: 5.7-6.4%   Diagnostic of diabetes: > or = 6.5%  .       Monitoring of Diabetes                Age (y)     Therapeutic Goal (%)   Adults:          >18           <7.0   Pediatrics:    13-18           <7.5                   7-12           <8.0                   0- 6            7.5-8.5   American Diabetes Association. Diabetes Care 33(S1), Jan 2010.       VLDL   Date/Time Value Ref Range Status   09/13/2023 10:58 AM 21 0 - 40 mg/dL Final   03/19/2021 04:14 PM 19 0 - 40 mg/dL Final      Last I/O:  No intake/output data recorded.    Past Cardiology Tests (Last 3 Years):  EKG:  ECG 12 lead 03/05/2024 (Wet Read)      ECG 12 lead 02/21/2024      ECG 12 lead 11/14/2023    Echo:  Transthoracic Echo (TTE) Complete 02/23/2024    Ejection Fractions:  No results found for: \"EF\"  Cath:  No results found for this or any previous visit from the past 1095 days.    Stress Test:  No results found for this or any previous visit from the past 1095 days.    Cardiac Imaging:  CT angio coronary art with heartflow if score >30% 02/22/2024      Past Medical History:  She has a past medical history of Benign neoplasm of thyroid gland, Other specified cough (02/18/2019), Personal history of other diseases of the respiratory system (12/26/2017), Personal history of other diseases of the " respiratory system (07/31/2018), Personal history of other drug therapy (11/01/2016), and Personal history of other infectious and parasitic diseases (04/13/2015).    Past Surgical History:  She has a past surgical history that includes Total hip arthroplasty (02/13/2014) and Tubal ligation (01/04/2018).      Social History:  She reports that she has quit smoking. Her smoking use included cigarettes. She has a 20.00 pack-year smoking history. She has been exposed to tobacco smoke. She has never used smokeless tobacco. She reports that she does not currently use alcohol. She reports that she does not use drugs.    Family History:  Family History   Problem Relation Name Age of Onset    Hypertension Mother      Heart attack Mother      Hypertension Father      Heart attack Father      Stroke Father      Diabetes Sister      Hypertension Sister      Seizures Sister          Allergies:  Latex, Other, Sulfa (sulfonamide antibiotics), Triethanolamine, and Triethanolamine oleate    Inpatient Medications:        Outpatient Medications:  Current Outpatient Medications   Medication Instructions    acetaminophen (TYLENOL) 325 mg, oral, Every 6 hours PRN    albuterol 90 mcg/actuation inhaler 1-2 puffs, inhalation, Every 6 hours PRN    ascorbic acid (VITAMIN C) 500 mg, oral, Daily    aspirin 81 mg, oral, Daily    atorvastatin (LIPITOR) 40 mg, oral, Nightly    carvedilol (COREG) 25 mg, oral, 2 times daily with meals    fexofenadine (ALLEGRA ALLERGY) 180 mg, oral, Daily PRN    nitroglycerin (NITROSTAT) 0.4 mg, sublingual, Every 5 min PRN    omeprazole (PRILOSEC) 40 mg, oral, 2 times daily before meals, Do not crush or chew.    ondansetron (ZOFRAN) 4 mg, oral, Every 8 hours PRN    SUMAtriptan (IMITREX) 25 mg, oral, Daily PRN    topiramate (TOPAMAX) 100 mg, oral, Daily       Physical Exam:  GEN: NAD  HEENT: ATNC,  anicteric, no JVD  CV: RRR, no r/g/m  Pulm: CTAB  Abdomen: NTND  Ext: warm, no LE edema noted  Neuro: A+Ox3  Psych:  appropriate       Assessment/Plan     63-year-old female with a history of GERD, hypertension, dyslipidemia who is presenting to the ED for chest pain after recent dc with mild CAD seen on CT coronary with normal TTE.    #atypical chest pain    Recommendations  -discussed with patient risk and benefit of invasive angiogram for definitive coronary evaluation but felt that her symptoms do not seem cardiac related at this time and reassuring lab work, ecg, CT scan, echo will continue observation and outpt follow up  -keep appt with Dr Cheng on 4/3  -continue ASA 81, atorvastatin 40  -continue SLN prn  -continue omperazole daily (recently completed BID)  -replete Mg and K (may help with cramping)  -okay to dc from cardiology perspective    Thank you for interesting consult.  Patient was staffed with Dr. Roberts  Pager numbers:  General Cardiology Consult Pager: [phone] (weekday 7AM-6PM and weekend 7AM-2PM) and other: [phone]      Joey Isaacs MD

## 2024-03-05 NOTE — ED PROCEDURE NOTE
History of Present Illness   CC: Chest Pain     History provided by: Patient  Limitations to History: None    HPI:  Juju Adams is a 63 y.o. female with a PMH of CAD, GERD, HTN, HLD, Asthma/COPD (no PFTs) who presents to the ED with chest pain. She reports that she woke up at 5:00 AM with nausea and right-sided sharp chest pain rated 9/10 that radiates to her right arm. She took Nitroglycerin at around 5:30 AM, which mildly improved the pain. She further endorses symptoms of headache, dizziness, diaphoresis, and bilateral lower extremity edema that began last night. She denies shortness of breath, dyspnea, fatigue, weakness, emesis, diarrhea, numbness, tingling, syncope, and hearing/vision changes. She endorses a family history of CAD and MI.     The patient was recently discharged from the hospital on 2/25 for atypical chest pain in which CTA findings show an elevated coronary calcium score of 394 and mild to moderate LAD and RCA stenosis. She was placed on Carvedilol and discontinued from Metoprolol and Hydrochlorothiazide. She was also placed on Omeprazole BID for GERD. The patient endorses taking her medications as prescribed.     Physical Exam   Visit Vitals  /81 (BP Location: Left arm, Patient Position: Sitting)   Pulse 73   Temp 36.8 °C (98.2 °F) (Temporal)   Resp 17     General: NAD  HEENT: PERRL, EOMI, MMM,   Neck: Supple, No LAD, Normal ROM  Resp: normal respiratory effort, no crackles, no rales, no stridor, +bilateral wheezing   CV: RRR, normal S1/S2, no m/r/g  ABD: Soft, non-distended, non-tender, normal bowel sounds, no masses  Extremities: Cap refill <2s, normal tone and ROM, bilateral lower extremity edema  Neuro: A&Ox3, cranial nerves intact,   Skin: intact, no rashes, lesions, or erythema    ED Course & Medical Decision Making   Differential diagnoses considered include but are not limited to: Acute coronary syndrome     Social Determinants Limiting Care: None identified    MDM:  63  y.o. female with PMH of CAD, GERD, HTN, HLD, Asthma/COPD (no PFTs) who presents to the ED with chest pain. She further endorses nausea, headache, diaphoresis, and bilateral lower extremity edema. In the ED, she was found to be in acute pain, alert & oriented x,3, and hypertensive (165/81). There was no increased work of breathing or tachycardia. ECG was unremarkable with normal sinus rhythm and no ST elevations. CXR was unremarkable in comparison to prior. The patient's chest pain and nausea were treated symptomatically with acetaminophen, aspirin, and ondansetron. Potassium was found to be 3.2 and replenished with 40 mEq. Due to lack of clinical findings and no significant prior history of clotting, workup for DVT/PE was not pursued.     Disposition   Given the patient recent hospital admission for CAD and a HEART score of 4, she will be admitted for further cardiac diagnostics.     Procedures   CXR  IMPRESSION:  No acute pulmonary pathology.  Signed by Inocencio Poole MD    EKG  Normal sinus rhythm    Patient seen and discussed with ED attending physician.    Anjali Oliavrez  Medical Student

## 2024-03-05 NOTE — ED PROVIDER NOTES
History of Present Illness   CC: Chest Pain     History provided by: Patient  Limitations to History: None    HPI:  Juju Adams is a 63 y.o. female with a PMH of CAD, GERD, HTN, HLD, Asthma/COPD (no PFTs) who presents to the ED with chest pain. She reports that she woke up at 5:00 AM with nausea and right-sided sharp chest pain rated 9/10 that radiates to her right arm. She took Nitroglycerin at around 5:30 AM, which mildly improved the pain. She further endorses symptoms of headache, dizziness, diaphoresis, and bilateral lower extremity edema that began last night. She denies shortness of breath, dyspnea, fatigue, weakness, emesis, diarrhea, numbness, tingling, syncope, and hearing/vision changes. She endorses a family history of CAD and MI.      The patient was recently discharged from the hospital on 2/25 for atypical chest pain in which CTA findings show an elevated coronary calcium score of 394 and mild to moderate LAD and RCA stenosis. She was placed on Carvedilol and discontinued from Metoprolol and Hydrochlorothiazide. She was also placed on Omeprazole BID for GERD. The patient endorses taking her medications as prescribed.  The patient also notes bilateral lower extremity edema that is acute on chronic with left calf pain.  Denied history of blood clots.       Physical Exam   Visit Vitals  /81 (BP Location: Left arm, Patient Position: Sitting)   Pulse 73   Temp 36.8 °C (98.2 °F) (Temporal)   Resp 17      General: NAD  HEENT: PERRL, EOMI, MMM,   Neck: Supple, No LAD, Normal ROM  Resp: normal respiratory effort, no crackles, no rales, no stridor, +bilateral wheezing   CV: RRR, normal S1/S2, no m/r/g  ABD: Soft, non-distended, non-tender, normal bowel sounds, no masses  Extremities: Cap refill <2s, normal tone and ROM, bilateral lower extremity edema  Neuro: A&Ox3, cranial nerves intact,   Skin: intact, no rashes, lesions, or erythema    ED Course & Medical Decision Making   Differential  diagnoses considered include but are not limited to: Acute coronary syndrome      Social Determinants Limiting Care: None identified     MDM:  63 y.o. female with PMH of CAD, GERD, HTN, HLD, Asthma/COPD (no PFTs) who presents to the ED with chest pain. She further endorses nausea, headache, diaphoresis, and bilateral lower extremity edema. In the ED, she was found to be in acute pain, alert & oriented x,3, and hypertensive (165/81). There was no increased work of breathing or tachycardia. ECG was unremarkable with normal sinus rhythm and no ST elevations. CXR was unremarkable in comparison to prior. The patient's chest pain and nausea were treated symptomatically with acetaminophen, aspirin, and ondansetron. Potassium was found to be 3.2 and replenished with 40 mEq. Due to lack of clinical findings and no significant prior history of clotting, workup for PE was not pursued.  Duplex ultrasound did not display findings concerning for DVT.  Lower clinical concern for ACS with unremarkable EKG and troponin.  Patient noted that she has cardiac follow-up the first week of April.  Upon discharge, patient was recommended to undergo a stress echo. Patient notes that she would like to be admitted given that she continues to have persistent right-sided chest pain and would like to be worked up while she is here.    Disposition   Given the patient recent hospital admission for CAD and a HEART score of 4 given presentation, history, and age.  Discussed patient presentation with cardiology who recommended stress echo and they will evaluate after the echo. She will be admitted to CDU for stress echo with cardiology consult.  Discussed ED findings and admission with the patient.  All questions were answered.  Discussed patient presentation with CDU provider.  Patient admitted to the CDU in stable condition.      Procedures   CXR  IMPRESSION:  No acute pulmonary pathology.  Signed by Inocencio Poole MD     EKG  Rate is 67, sinus  rhythm, normal axis, no interval prolongation, no st elevation or depression.  When compared to EKG on 2/21/2024 review of EKG does not show any signs of STEMI, complete heart block, asystole, V-fib.     Patient seen and discussed with ED attending physician.     Anjali Olivarez  Medical Student    Otf Mayo MD  EM PGY2    Comment: Please note this report has been produced using speech recognition software and may contain errors related to that system including errors in grammar, punctuation, and spelling as well as words and phrases that may be inappropriate.  If there are any questions or concerns please feel free to contact the dictating provider for clarification.       Otf Mayo MD  Resident  03/05/24 1437       Otf Mayo MD  Resident  03/05/24 5576

## 2024-03-05 NOTE — DISCHARGE INSTRUCTIONS
Please follow up with your primary care doctor regarding your symptoms and presentation today. I have provided follow-up information for our primary care clinic if a doctor is needed. Please also follow up with cardiology. I have ordered a referral and you can also reach their office at the number [phone]. Return to the emergency department for any new or worsening symptoms or for any other concerns.

## 2024-03-05 NOTE — PROGRESS NOTES
Pharmacy Medication History Review    Juju Adams is a 63 y.o. female admitted for Chest pain. Pharmacy reviewed the patient's [id number] medications  for accuracy.    The list below reflects the updated PTA list. Comments regarding how patient may be taking medications differently can be found in the Admit Orders Activity  Prior to Admission Medications   Prescriptions Last Dose Informant   SUMAtriptan (Imitrex) 25 mg tablet  Self   Sig: Take 1 tablet (25 mg) by mouth once daily as needed for migraine.   acetaminophen (Tylenol) 325 mg tablet  Self   Sig: Take 1 tablet (325 mg) by mouth every 6 hours if needed (pain).   albuterol 90 mcg/actuation inhaler  Self   Sig: Inhale 1-2 puffs every 6 hours if needed for shortness of breath or wheezing.   ascorbic acid (Vitamin C) 500 mg tablet  Self   Sig: Take 1 tablet (500 mg) by mouth once daily.   aspirin 81 mg EC tablet  Self   Sig: Take 1 tablet (81 mg) by mouth once daily.   atorvastatin (Lipitor) 40 mg tablet  Self   Sig: Take 1 tablet (40 mg) by mouth once daily at bedtime.   carvedilol (Coreg) 25 mg tablet  Self   Sig: Take 1 tablet (25 mg) by mouth 2 times a day with meals.   fexofenadine (Allegra Allergy) 180 mg tablet  Self   Sig: Take 1 tablet (180 mg) by mouth once daily as needed.   nitroglycerin (Nitrostat) 0.4 mg SL tablet 3/5/2024 Self   Sig: Place 1 tablet (0.4 mg) under the tongue every 5 minutes if needed.   omeprazole (PriLOSEC) 40 mg DR capsule  Self   Sig: Take 1 capsule (40 mg) by mouth 2 times a day before meals. Do not crush or chew.   Patient taking differently: Take 1 capsule (40 mg) by mouth once daily. Do not crush or chew.   ondansetron (Zofran) 4 mg tablet  Self   Sig: Take 1 tablet (4 mg) by mouth every 8 hours if needed for nausea or vomiting.   topiramate (Topamax) 100 mg tablet  Self   Sig: Take 1 tablet (100 mg) by mouth once daily.      Facility-Administered Medications: None          Patient accepts M2B at discharge.  Pharmacy has been updated to [facility].    Sources:    -patient interview  -outpatient pharmacy dispense history  -Care Everywhere medication lists  -OARRS  -discharge summary med list from 2/24/24    Below are additional concerns with the patient's PTA list:    -at discharge (2/24/24) from previous admission to [facility] patient was told to stop her former home meds of hydrochlorothiazide 25 daily and metoprolol succinate 50mg daily.  This was replaced with carevedilol 25mg twice a day.  -omperazole 40mg-  patient took twice daily since discharge on 2/24 and then has been taking once daily since Zeferino 3/3  -patient thought her topamax was only to be taken as needed for migraines, she recently started taking 100mg once daily for migraine prevention and imitrex as needed for migraines.    Khanh Peña, PharmD  Transitions of Care Pharmacist  [facility]  Please reach out via Secure Chat for questions, or if no response call [facility] or [facility]

## 2024-03-07 LAB
ATRIAL RATE: 67 BPM
P AXIS: 60 DEGREES
P OFFSET: 195 MS
P ONSET: 134 MS
PR INTERVAL: 160 MS
Q ONSET: 214 MS
QRS COUNT: 11 BEATS
QRS DURATION: 86 MS
QT INTERVAL: 408 MS
QTC CALCULATION(BAZETT): 431 MS
QTC FREDERICIA: 423 MS
R AXIS: -2 DEGREES
T AXIS: 54 DEGREES
T OFFSET: 418 MS
VENTRICULAR RATE: 67 BPM

## 2024-03-19 ENCOUNTER — OFFICE VISIT (OUTPATIENT)
Dept: PRIMARY CARE | Facility: HOSPITAL | Age: 64
End: 2024-03-19
Payer: COMMERCIAL

## 2024-03-19 VITALS
HEART RATE: 63 BPM | BODY MASS INDEX: 35.47 KG/M2 | SYSTOLIC BLOOD PRESSURE: 168 MMHG | DIASTOLIC BLOOD PRESSURE: 87 MMHG | TEMPERATURE: 96.8 F | HEIGHT: 63 IN | OXYGEN SATURATION: 94 % | WEIGHT: 200.2 LBS

## 2024-03-19 DIAGNOSIS — R07.89 OTHER CHEST PAIN: Primary | ICD-10-CM

## 2024-03-19 DIAGNOSIS — M79.89 LEG SWELLING: ICD-10-CM

## 2024-03-19 PROCEDURE — 99214 OFFICE O/P EST MOD 30 MIN: CPT | Mod: GC

## 2024-03-19 PROCEDURE — 3079F DIAST BP 80-89 MM HG: CPT

## 2024-03-19 PROCEDURE — 99214 OFFICE O/P EST MOD 30 MIN: CPT

## 2024-03-19 PROCEDURE — 3077F SYST BP >= 140 MM HG: CPT

## 2024-03-19 PROCEDURE — 1036F TOBACCO NON-USER: CPT

## 2024-03-19 RX ORDER — COMP.STOCKING,THIGH,LONG,LARGE
3 EACH MISCELLANEOUS DAILY
Qty: 3.214 EACH | Refills: 2 | Status: SHIPPED | OUTPATIENT
Start: 2024-03-19

## 2024-03-19 RX ORDER — CHLORTHALIDONE 25 MG/1
25 TABLET ORAL DAILY
Qty: 90 TABLET | Refills: 1 | Status: SHIPPED | OUTPATIENT
Start: 2024-03-19 | End: 2024-09-15

## 2024-03-19 ASSESSMENT — ENCOUNTER SYMPTOMS
OCCASIONAL FEELINGS OF UNSTEADINESS: 0
DEPRESSION: 0
LOSS OF SENSATION IN FEET: 0

## 2024-03-19 ASSESSMENT — PAIN SCALES - GENERAL: PAINLEVEL: 3

## 2024-03-19 NOTE — PROGRESS NOTES
I reviewed the resident/fellow's documentation and discussed the patient with the resident/fellow. I agree with the resident/fellow's medical decision making as documented in the note.    Case d/w Dr. Faust; agree with his A/P. Would start Chlorthalidone 25mg daily - this will help the BP and the LE edema. Pt should follow up with the clinic RN in a few weeks for BP recheck. TEDs are advised; low-salt diet and LE elevation. If BP remains elevated would discontinue the Kcl supplement at follow up visit and add Spironolactone. Pt has follow up with cardiology. Pt should be screened for CHAZ with a PSG.    Sue Vogel MD

## 2024-03-19 NOTE — PATIENT INSTRUCTIONS
As discussed today, our plan is:     Labs - we collected labs today and will call you with any abnormal results. If you have any questions or concerns prior to us calling feel free to call our office to have your questions addressed.   Medication changes: Started Chlorthalidone 25 daily, Compression stockings  Consultations - you were referred to see the following specialists:  (Sleep medicine-for Sleep study) You should receive a call from central scheduling in the next few days if you do not receive a call within 3-5 business days please call [phone] to schedule your appointment.   4.   Please follow up with Cardiology for chest pain.     Please come back to see me in: 2 months  ------  If you have any problems or questions, please contact the clinic at [phone] to leave a message. Our fax number is [phone]. If your issue cannot wait until the next business day, please go to urgent care or the emergency department.     I also strongly urge all of my patients to register for [facility] by going to: [email or web address]  (The  staff can also send you a text/email link to register when you check out).    No shows: It is understandable if you are unable to make it to a visit, but please cancel your appointment instead of not showing up. This helps to give other patients access to primary care and keeps wait times low.        [facility]   [phone]

## 2024-03-19 NOTE — PROGRESS NOTES
Chief complaint:    HPI:  Juju Adams is a 63 y.o. female with PMH of HTN, HLD, CAD, mild intermittent asthma, migraine headaches, renal cyst who presents to clinic for FUV after being discharged from the ED for bilateral leg pain, bilateral leg swelling and right sided chest pain.     Patient present to [facility] with same complaint as ED visit. She reported 1 month history of bilateral leg swelling, leg pain and right sided chest pain. Patient was recently admitted to hospital on 2/2024 for atypical chest pain and URI where she was switched from Metoprolol succinate to Coreg, pravastatin to atorvastatin and her hydrochlorothiazide was stopped by Cardiology.  During her hospitalization she was hypertensive but ACS workup was negative. CTA during hospitalization showed a coronary calcium score of 394 and mild-moderate stenosis of LAD and RCA. TTE was unremarkable. She reported that after she started taking the atorvastatin and Coreg that her legs have gradually started swelling. She has been compliant on all of her medications and also takes magnesium as well as calcium supplements. In regards to her chest pain she described it as right sided chest pain, non-pleuritic, non-radiating, worse with exertion, relieved with rest and improved with sublingual NTG    Her ED visit for similar complaints was on 3/5/2024. HS trop negative x 2, BNP wnls., bilateral U/S were negative for DVT. She was hypokalemic and hypoMg. Cardiology was consulted in ED, who felt symptoms were not cardiac in origin but, recommended maintaining Cardiology follow up on 4/3/2024.     Medications:    Current Outpatient Medications:     albuterol 90 mcg/actuation inhaler, Inhale 1-2 puffs every 6 hours if needed for shortness of breath or wheezing., Disp: , Rfl:     ascorbic acid (Vitamin C) 500 mg tablet, Take 1 tablet (500 mg) by mouth once daily., Disp: , Rfl:     aspirin 81 mg EC tablet, Take 1 tablet (81 mg) by mouth once daily., Disp: , Rfl:      atorvastatin (Lipitor) 40 mg tablet, Take 1 tablet (40 mg) by mouth once daily at bedtime., Disp: 90 tablet, Rfl: 1    carvedilol (Coreg) 25 mg tablet, Take 1 tablet (25 mg) by mouth 2 times a day with meals., Disp: 180 tablet, Rfl: 1    chlorthalidone (Hygroton) 25 mg tablet, Take 1 tablet (25 mg) by mouth once daily., Disp: 90 tablet, Rfl: 1    comp.stocking,thigh,long,large (EMS Anti-Embolism Stocking) misc, 3 each once daily., Disp: 3.214 each, Rfl: 2    fexofenadine (Allegra Allergy) 180 mg tablet, Take 1 tablet (180 mg) by mouth once daily as needed., Disp: , Rfl:     nitroglycerin (Nitrostat) 0.4 mg SL tablet, Place 1 tablet (0.4 mg) under the tongue every 5 minutes if needed., Disp: , Rfl:     omeprazole (PriLOSEC) 40 mg DR capsule, Take 1 capsule (40 mg) by mouth 2 times a day before meals. Do not crush or chew. (Patient taking differently: Take 1 capsule (40 mg) by mouth once daily. Do not crush or chew.), Disp: 180 capsule, Rfl: 1    SUMAtriptan (Imitrex) 25 mg tablet, Take 1 tablet (25 mg) by mouth once daily as needed for migraine., Disp: , Rfl:     topiramate (Topamax) 100 mg tablet, Take 1 tablet (100 mg) by mouth once daily., Disp: , Rfl:     Allergies:  Allergies   Allergen Reactions    Latex Unknown    Other Unknown    Sulfa (Sulfonamide Antibiotics) Hives and Unknown    Triethanolamine Unknown    Triethanolamine Oleate Unknown       Vitals:  Vitals:    03/19/24 1500   BP: 168/87   Pulse: 63   Temp: 36 °C (96.8 °F)   SpO2: 94%       Family Hx:  Nephew- SLE   Sister- DM, ESRD      Physical exam:  Constitutional: Well-developed female in no acute distress.  HEENT: Normocephalic, atraumatic. PERRL. EOMI. No cervical lymphadenopathy.  Respiratory: CTA bilaterally. No wheezes, rales, or rhonchi. Normal respiratory effort.  Cardiovascular: RRR. No murmurs, gallops, or rubs. No JVD. Radial pulses 2+.  Abdominal: Soft, nondistended, nontender to palpation. Bowel sounds present. No hepatosplenomegaly or  masses. No CVA tenderness.  Neuro: CN II-XII intact. UE and LE strength 5/5 bilaterally and sensation intact. Normal FTN testing.  MSK: 3+ LE slightly pitting edema bilaterally.  Skin: Warm, dry. No rashes or wounds.  Psych: Appropriate mood and affect.    Labs:  No results found for this or any previous visit (from the past 24 hour(s)).    Imaging:  Transthoracic Echo (TTE) Complete    Result Date: 2/23/2024   [facility], [address] [place] [address]                Tel [phone] and Fax [phone] TRANSTHORACIC ECHOCARDIOGRAM REPORT  Patient Name:      MURTAZA TRAN CAMPBELL  Reading Physician:    [id number] ePrnell Sheridan MD Study Date:        2/23/2024            Ordering Provider:    [id number] TRACI PANTOJA MRN/PID:           [id number]             Fellow: Accession#:        [id number]         Nurse:                Bibi Berman RN Date of Birth/Age: 1960 / 63 years Sonographer:          DOMENICA Leyva RDCS Gender:            F                    Additional Staff: Height:            160.02 cm            Admit Date:           2/21/2024 Weight:            90.72 kg             Admission Status:     Inpatient -                                                               Routine BSA / BMI:         1.93 m2 / 35.43      Encounter#:           [id number]                    kg/m2                                         Department Location:  [facility] Non                                                               Invasive Blood Pressure: 121 /75 mmHg Study Type:    TRANSTHORACIC ECHO (TTE) COMPLETE Diagnosis/ICD: Other chest pain-R07.89 Indication:    Chest pain CPT Code:      Echo Complete w Full Doppler-[id number] Patient History: Pertinent History: Htn, HLD, chest pain, increased calcium score. Study  Detail: The following Echo studies were performed: 2D, M-Mode, Doppler and               color flow. Definity used as a contrast agent for endocardial               border definition. Total contrast used for this procedure was 2 mL               via IV push. The patient was awake.  PHYSICIAN INTERPRETATION: Left Ventricle: The left ventricular systolic function is normal, with an estimated ejection fraction of 60%. There are no regional wall motion abnormalities. The left ventricular cavity size is normal. Spectral Doppler shows a normal pattern of left ventricular diastolic filling. Left Atrium: The left atrium is normal in size. Right Ventricle: The right ventricle is normal in size. There is normal right ventricular global systolic function. Right Atrium: The right atrium is normal in size. Aortic Valve: The aortic valve is trileaflet. There is no evidence of aortic valve regurgitation. The peak instantaneous gradient of the aortic valve is 6.4 mmHg. Mitral Valve: The mitral valve is normal in structure. There is no evidence of mitral valve regurgitation. Tricuspid Valve: The tricuspid valve is structurally normal. There is trace tricuspid regurgitation. The right ventricular systolic pressure is unable to be estimated. Pulmonic Valve: The pulmonic valve is not well visualized. The pulmonic valve regurgitation was not well visualized. Pericardium: There is a trivial pericardial effusion. Aorta: The aortic root is normal. In comparison to the previous echocardiogram(s): There are no prior studies on this patient for comparison purposes. Compared with study from 9/8/2023,.  CONCLUSIONS:  1. Left ventricular systolic function is normal with a 60% estimated ejection fraction.  2. Poorly visualized anatomical structures due to suboptimal image quality. QUANTITATIVE DATA SUMMARY: LA VOLUME:                               Normal Ranges: LA Vol A4C:        45.5 ml    (22+/-6mL/m2) LA Vol A2C:        44.6 ml LA Vol BP:          47.7 ml LA Vol Index A4C:  23.5ml/m2 LA Vol Index A2C:  23.1 ml/m2 LA Vol Index BP:   24.6 ml/m2 LA Area A4C:       15.0 cm2 LA Area A2C:       15.7 cm2 LA Major Axis A4C: 4.2 cm LA Major Axis A2C: 4.7 cm RA VOLUME BY A/L METHOD:                               Normal Ranges: RA Vol A4C:        39.3 ml    (8.3-19.5ml) RA Vol Index A4C:  20.3 ml/m2 RA Area A4C:       13.6 cm2 RA Major Axis A4C: 4.0 cm AORTA MEASUREMENTS:                    Normal Ranges: Asc Ao, d: 2.90 cm (2.1-3.4cm) LV DIASTOLIC FUNCTION:                               Normal Ranges: MV Peak E:        0.87 m/s    (0.7-1.2 m/s) MV Peak A:        0.84 m/s    (0.42-0.7 m/s) E/A Ratio:        1.04        (1.0-2.2) MV e'             0.08 m/s    (>8.0) MV lateral e'     0.08 m/s MV medial e'      0.07 m/s MV A Dur:         98.00 msec E/e' Ratio:       10.88       (<8.0) a'                0.12 m/s PulmV Sys Brodie:    51.00 cm/s PulmV De Jesus Brodie:   32.60 cm/s PulmV S/D Brodie:    1.60 PulmV A Revs Brodie: 44.10 cm/s PulmV A Revs Dur: 103.00 msec MITRAL VALVE:                 Normal Ranges: MV DT: 246 msec (150-240msec) AORTIC VALVE:                         Normal Ranges: AoV Vmax:      1.26 m/s (<=1.7m/s) AoV Peak P.4 mmHg (<20mmHg) LVOT Max Brodie:  1.03 m/s (<=1.1m/s) LVOT VTI:      17.90 cm LVOT Diameter: 2.20 cm  (1.8-2.4cm) AoV Area,Vmax: 3.11 cm2 (2.5-4.5cm2)  RIGHT VENTRICLE: RV Basal 3.80 cm RV Mid   1.80 cm RV Major 7.0 cm TAPSE:   18.1 mm RV s'    0.14 m/s PULMONIC VALVE:                      Normal Ranges: PV Max Brodie: 1.1 m/s  (0.6-0.9m/s) PV Max P.4 mmHg Pulmonary Veins: PulmV A Revs Dur: 103.00 msec PulmV A Revs Brodie: 44.10 cm/s PulmV De Jesus Brodie:   32.60 cm/s PulmV S/D Brodie:    1.60 PulmV Sys Brodie:    51.00 cm/s  [id number] Pernell Sheridan MD Electronically signed on 2024 at 5:07:56 PM  ** Final **     CT angio coronary art with heartflow if score >30%    Result Date: 2024  Interpreted By:  Yoseph Perry, STUDY: CT ANGIO CORONARY ART WITH HEARTFLOW  IF SCORE >30%;  2/22/2024 11:17 am   INDICATION: Signs/Symptoms:Chest Pain.   COMPARISON: CT dated 11/03/2023 and 03/06/2021   ACCESSION NUMBER(S): [id number]   ORDERING CLINICIAN: OSCAR CHIANG   TECHNIQUE: Using multi-detector CT technology,  axial, sequential imaging with prospective gating was performed of the chest following the intravenous administration of contrast material.  A low-osmolar contrast agent was used (90 mL Omnipaque 350).   The patient was premedicated with  0 mg i.v metoprolol and 0.8 mg sublingual nitroglycerin for heart rate control and coronary dilation, respectively.   For optimization of anatomic evaluation, multiplanar reconstruction, maximum intensity projections, and advanced 3-D off-line postprocessing were performed on a dedicated stand-alone workstation under the direct supervision of the interpreting physician.   CT Dose-Length Product (DLP):   458 mGy/cm CT Dose Reduction Employed: Yes (Prospective triggering, iterative reconstruction)   FINDINGS: POTENTIAL STUDY LIMITATIONS:  None.   CORONARY ARTERIES:   CORONARY ARTERY CALCIUM SCORE: Left main: 42.9 LAD: 343.4 Left circumflex: 0 RCA: 7.7   Total: 394   CORONARY ANATOMY: There is normal origin of the coronary arteries.   LEFT MAIN CORONARY ARTERY: The left main is normal sized vessel that  bifurcates into the LAD and circumflex. Mixed calcified and noncalcified plaque within the left main results in minimal (1-24%) stenosis.   LEFT ANTERIOR DESCENDING ARTERY: The LAD is a normal size vessel that  wraps around the apex. It gives rise to  2 acute diagonal branches. Mixed calcified and noncalcified plaque in the proximal to mid LAD results in mild stenosis (30-40%). Punctate calcified plaque at the origin of the 2nd diagonal branch results in minimal stenosis.   LEFT CIRCUMFLEX ARTERY: The LCX is a normal size vessel, which is  non-dominant. It gives rise to  2 obtuse marginal branches. The 1st obtuse marginal branch is a large  caliber vessel. Remaining circumflex is diminutive in size. There is no significant atherosclerotic change or stenotic disease.   RIGHT CORONARY ARTERY: The RCA is a normal size vessel, which is  dominant . It gives rise to a  conus branch,  grady branch, and  3 acute marginal branches.  In its distal segment it bifurcates into the PDA and PV branch. Scattered areas of mixed calcified and noncalcified plaque results in mild stenosis (30-40%).   CARDIAC CHAMBERS: The cardiac chambers demonstrate normal atrioventricular and ventriculoarterial concordance, and systemic and pulmonary venous return.   LEFT ATRIUM: Normal size (19.3-cm2)   RIGHT ATRIUM: Normal size (16.7-cm2)   INTERATRIAL SEPTUM: Intact.   LEFT VENTRICLE: Normal size (4.2-cm)   RIGHT VENTRICLE: Normal size (4.1-cm)   AORTIC VALVE: The aortic valve is  trileaflet in morphology.  No calcifications.   MITRAL VALVE: No thickening/calcification.   THORACIC AORTA: The visualized thoracic aorta is normal in course, caliber, and contour. Mild noncalcified and calcified plaque of the visualized aorta. There is no acute aortic pathology, such as dissection, intramural hematoma, or contained rupture. The aortic arch is not included on this examination.   PERICARDIUM: There is no pericardial effusion of thickening.   CHEST: The chest wall is normal. No significant lymphadenopathy or mass is seen in limited images of the mediastinum. Diffuse peribronchial thickening with mucous plugging that is greatest in the lower lobes. Areas of left-greater-than-right dependent atelectasis are seen. No pleural effusion or pneumothorax. Small hiatal hernia.   UPPER ABDOMEN: Scattered subcentimeter hypodensities in the liver are too small to adequately characterize but are stable from prior exam and most likely represent simple hepatic cysts. Stable nodularity of the left adrenal gland.       1. Nonobstructive coronary artery disease involving the left main (minimal stenosis less  than 25%), LAD and RCA (minimal to mild stenosis 30-40%). Coronary calcium score is 394. 2. Diffuse peribronchial thickening and mucous plugging in the lower lobes. Correlate with clinical concern for reactive airways disease or viral respiratory infection. 3. Small hiatal hernia.     MACRO: None   Signed by: Yoseph Perry 2/22/2024 11:46 AM Dictation workstation:   [id number]    ECG 12 lead    Result Date: 2/21/2024  Normal sinus rhythm Anterior infarct (cited on or before 13-NOV-2023) Abnormal ECG When compared with ECG of 13-NOV-2023 21:14, QRS axis Shifted right See ED provider note for full interpretation and clinical correlation Confirmed by Sj Willams ([id number]) on 2/21/2024 5:40:14 AM    XR chest 2 views    Result Date: 2/21/2024  Interpreted By:  Sunshine Chambers, STUDY: XR CHEST 2 VIEWS;  2/21/2024 4:56 am   INDICATION: Signs/Symptoms:Chest Pain.   COMPARISON: 11/13/2023   ACCESSION NUMBER(S): [id number]   ORDERING CLINICIAN: PINEDA SIMMS   FINDINGS: PA and lateral radiographs of the chest were provided.       CARDIOMEDIASTINAL SILHOUETTE: Cardiomediastinal silhouette is normal in size and configuration.   LUNGS: Lungs are mildly hyperinflated. Left basilar atelectasis or scarring. No focal consolidation, pleural effusion or sizable pneumothorax seen.   ABDOMEN: No remarkable upper abdominal findings.   BONES: No acute osseous changes.       1.  Findings suggestive of emphysema or  D. No focal consolidation.       MACRO: None   Signed by: Sunshine Chambers 2/21/2024 5:00 AM Dictation workstation:   [id number]      Assessment and plan:  Juju Adams is a 63 y.o. female with PMH of HTN, HLD, CAD, mild intermittent asthma, migraine headaches, renal cyst who presents to clinic for FUV after being discharged from the ED for leg pain, bilateral leg swelling and right sided chest pain.        #Bilateral Edema  :: Medication induced (Stopping hydrochlorothiazide) vs undiagnosed CHAZ vs  Lymphedema vs  Lipidemia vs HFpEF   -Started Chlorthalidone 25 daily   -Will avoid Lasix given sulfa allergy-Hives  -Ordered Compression stockings  -Ordered Sleep study -(STOP Bang-4 today)  -Consider lymphoscintigram if work-up otherwise negative     #GERD  - C/w omeprazole 40   - Consider referral to GI for consideration of PH studies if chest pain evaluated by Cardiology is non-cardiac, also consider EGD  - referral to dietitian for weight loss  - counseled on minimizing acidic food, eating close to bedtime     #Typical Angina   #CAD  - Occasional substernal chest pains 1-2 times/year for past 2 years, relieved by nitroglycerine  - [facility] 3/2013: mild diffuse coronary disease, normal LV pressures  - Stress echo (3/2021): resting EF 60-65%, stress EF > 75%, normal stress echo  - Last EKG 2021: NSR, no abnormalities  - CTA 2/21/24 with mild-moderate RCA and LAD stenosis and CT score of 394  Plan:  - Sublingual Nitroglycerine PRN  - Has cardiology appointment in April for further workup  - On atorva 40/aspirin 81  -Tylenol   -ESR/CRP for pericarditis given recent URI   -Pt was in a rush and was unwilling to get EKG at this visit      #HLD  -Last Lipid panel (9/2023): TChol 241, HDL 67.6, ,   -ASCVD >20%  Plan:  -C/w atorva 40mg  -Can re-check lipid panel in 3 months after atorva increase     #HTN  -controlled in office today and at home  Plan:  -instructed to maintain daily BP log  - Continue coreg 25mg BID  -Started Chlorthalidone 25 daily      #Migraines  -Significantly improved, has 1 migraine every 1-2 months  Plan:  -Continue Sumatriptan 25 mg PRN,   - Stop topiramate 100 BID because patient is not taking and migranes are less than once per month       # Health maintenance  Last HbA1c: 5.3 in 9/23  Infectious: Hep C, HIV Not addressed  Vaccinations: Not addressed  Colonoscopy: colonoscopy in 11/23, repeat in 7 years  Pap testing last done 11/23 normal, no longer needed, mammogram has referral for mammo  Low-dose CT  chest: Not indicated  Bone density: Revisit at 64  AAA screening: Not indicated    Follow-up in 2 months     Patient and plan discussed with attending physician, Dr. Vogel.     Eran Faust MD  PGY-2 Internal Medicine  [facility]

## 2024-03-26 ENCOUNTER — PATIENT OUTREACH (OUTPATIENT)
Dept: CARE COORDINATION | Facility: CLINIC | Age: 64
End: 2024-03-26
Payer: COMMERCIAL

## 2024-03-26 NOTE — PROGRESS NOTES
JESSICA 30 day [facility] follow up:  Chart reviewed, call placed and VMM left.  Will graduate from the [facility] program

## 2024-04-02 RX ORDER — PHENAZOPYRIDINE HYDROCHLORIDE 200 MG/1
200 TABLET, FILM COATED ORAL 3 TIMES DAILY PRN
COMMUNITY
Start: 2024-03-09

## 2024-04-02 RX ORDER — LORATADINE 10 MG/1
10 TABLET ORAL DAILY
COMMUNITY
Start: 2019-02-18

## 2024-04-02 RX ORDER — TAMSULOSIN HYDROCHLORIDE 0.4 MG/1
0.4 CAPSULE ORAL DAILY
COMMUNITY
Start: 2021-03-13

## 2024-04-02 RX ORDER — POLYETHYLENE GLYCOL-3350 AND ELECTROLYTES 236; 6.74; 5.86; 2.97; 22.74 G/274.31G; G/274.31G; G/274.31G; G/274.31G; G/274.31G
POWDER, FOR SOLUTION ORAL
COMMUNITY
Start: 2023-11-20

## 2024-04-03 ENCOUNTER — OFFICE VISIT (OUTPATIENT)
Dept: CARDIOLOGY | Facility: HOSPITAL | Age: 64
End: 2024-04-03
Payer: COMMERCIAL

## 2024-04-03 ENCOUNTER — NUTRITION (OUTPATIENT)
Dept: NUTRITION | Facility: HOSPITAL | Age: 64
End: 2024-04-03
Payer: COMMERCIAL

## 2024-04-03 VITALS
OXYGEN SATURATION: 95 % | SYSTOLIC BLOOD PRESSURE: 136 MMHG | DIASTOLIC BLOOD PRESSURE: 78 MMHG | HEART RATE: 76 BPM | BODY MASS INDEX: 34.19 KG/M2 | WEIGHT: 193 LBS

## 2024-04-03 DIAGNOSIS — R12 BURNING REFLUX: ICD-10-CM

## 2024-04-03 DIAGNOSIS — R07.9 CHEST PAIN IN ADULT: ICD-10-CM

## 2024-04-03 DIAGNOSIS — E78.00 PURE HYPERCHOLESTEROLEMIA: ICD-10-CM

## 2024-04-03 DIAGNOSIS — E66.9 OBESITY, UNSPECIFIED CLASSIFICATION, UNSPECIFIED OBESITY TYPE, UNSPECIFIED WHETHER SERIOUS COMORBIDITY PRESENT: ICD-10-CM

## 2024-04-03 DIAGNOSIS — I10 ESSENTIAL HYPERTENSION: ICD-10-CM

## 2024-04-03 DIAGNOSIS — R07.9 CHEST PAIN, UNSPECIFIED TYPE: Primary | ICD-10-CM

## 2024-04-03 DIAGNOSIS — I25.118 CORONARY ARTERY DISEASE INVOLVING NATIVE CORONARY ARTERY OF NATIVE HEART WITH OTHER FORM OF ANGINA PECTORIS (CMS-HCC): ICD-10-CM

## 2024-04-03 DIAGNOSIS — Z71.3 DIETARY COUNSELING: Primary | ICD-10-CM

## 2024-04-03 DIAGNOSIS — K21.9 GASTROESOPHAGEAL REFLUX DISEASE, UNSPECIFIED WHETHER ESOPHAGITIS PRESENT: ICD-10-CM

## 2024-04-03 PROBLEM — I25.10 CORONARY ARTERY DISEASE INVOLVING NATIVE CORONARY ARTERY OF NATIVE HEART: Status: ACTIVE | Noted: 2024-04-03

## 2024-04-03 PROCEDURE — 99215 OFFICE O/P EST HI 40 MIN: CPT | Performed by: STUDENT IN AN ORGANIZED HEALTH CARE EDUCATION/TRAINING PROGRAM

## 2024-04-03 PROCEDURE — 3078F DIAST BP <80 MM HG: CPT | Performed by: STUDENT IN AN ORGANIZED HEALTH CARE EDUCATION/TRAINING PROGRAM

## 2024-04-03 PROCEDURE — 97802 MEDICAL NUTRITION INDIV IN: CPT | Performed by: DIETITIAN, REGISTERED

## 2024-04-03 PROCEDURE — 93005 ELECTROCARDIOGRAM TRACING: CPT | Performed by: STUDENT IN AN ORGANIZED HEALTH CARE EDUCATION/TRAINING PROGRAM

## 2024-04-03 PROCEDURE — RXMED WILLOW AMBULATORY MEDICATION CHARGE

## 2024-04-03 PROCEDURE — 1036F TOBACCO NON-USER: CPT | Performed by: STUDENT IN AN ORGANIZED HEALTH CARE EDUCATION/TRAINING PROGRAM

## 2024-04-03 PROCEDURE — 3075F SYST BP GE 130 - 139MM HG: CPT | Performed by: STUDENT IN AN ORGANIZED HEALTH CARE EDUCATION/TRAINING PROGRAM

## 2024-04-03 RX ORDER — SPIRONOLACTONE 25 MG/1
12.5 TABLET ORAL DAILY
Qty: 45 TABLET | Refills: 3 | Status: SHIPPED | OUTPATIENT
Start: 2024-04-03 | End: 2025-04-03

## 2024-04-03 ASSESSMENT — PAIN SCALES - GENERAL: PAINLEVEL: 0-NO PAIN

## 2024-04-03 ASSESSMENT — ENCOUNTER SYMPTOMS
DEPRESSION: 0
LOSS OF SENSATION IN FEET: 0
OCCASIONAL FEELINGS OF UNSTEADINESS: 0

## 2024-04-03 NOTE — PATIENT INSTRUCTIONS
Dear Juju Adams,    It was a pleasure meeting you today at the Cardiology office. As we dicussed, your clinical condition is hypertension, heartburns, high cholesterol and calcium in your coronary arteries. I recommended rechecking your lipid panel in a month and adding spironolactone 12.5 mg once a day to your treatment to optimize your blood pressure and helping with cramps. I will contact you once your results are available to give you my impressions through [employer].    Sincerely,     Mychal Cheng MD

## 2024-04-03 NOTE — PROGRESS NOTES
Location of visit: [facility]   Type of Visit: Established     Chief Complaint:  Patient was referred to Cardiology by Dr. Vasquez for chest pain.    History Of Present Illness:    Juju Adams is a 63 y.o. female, with history significant for mild CAD, GERD, hiatal hernia, hypertension, migraine, kidney stones, asthma, prior smoker, BMI 34, and dyslipidemia, who visits Cardiology today as a follow up visit  for chest pain.  She was recently discharged from the hospital with atypical chest pain.  She had non obstructive CAD detected on coronary CTA, with majority of calcium in her LAD but resulted in mild stenosis. Ultimately coreg was started, asa 81, statin, and her ppi was doubled for a short period. She consulted again to the ER at the beginning of March after waking up at 5 AM not feeling well. Mainly with leg cramping, nausea, and right sided chest pressure. ECG unchanged from prior admission, US trop negative x 2 and BNP within normal limits. Rest of labs were significant for hypokalemia and hypoMg.  No emesis, no diarrhea, and no exertional chest pain.  No recent EGD.   She reports doing better since discharge. She changed her diet, lost weight and is exercising at home. No new episodes of chest pain. Only night cramps in calves.     BP appears not yet controlled, with systolic between 130-140 and diastolic 80-90 mmHg.     Patient denies chest pain, dyspnea on exertion, shortness of breath, orthopnea, PND, nocturia, edema, palpitations, dizziness, lightheadedness, syncope, claudication, or snoring/apnea.    Blood pressure today: 136/78 mmHg    Last ECG 3/5/2024 shows sinus rhythm at 67 bpm, normal AV conduction, decreased voltage, poor R wave progression in precordials, and normal ventricular repolarization.    Past Medical History:  She has a past medical history of mild CAD, GERD, hiatal hernia, hypertension, migraine, kidney stones, asthma, prior smoker, BMI 34, and dyslipidemia.    Past  Surgical History:  She has a past surgical history that includes Total hip arthroplasty (02/13/2014, left knee replacement, and Tubal ligation (01/04/2018).    Social History:  She reports that she has quit smoking. Her smoking use included cigarettes. She has a 20.00 pack-year smoking history. She has been exposed to tobacco smoke. She has never used smokeless tobacco. She reports that she does not currently use alcohol. She reports that she does not use drugs.    Family History:  Family History   Problem Relation Name Age of Onset    Hypertension Mother      Heart attack Mother      Hypertension Father      Heart attack Father      Stroke Father      Diabetes Sister      Hypertension Sister      Seizures Sister       Allergies:  Latex, Sulfa (sulfonamide antibiotics), and Triethanolamine oleate    Outpatient Medications:  Current Outpatient Medications   Medication Instructions    albuterol 90 mcg/actuation inhaler 1-2 puffs, inhalation, Every 6 hours PRN    ascorbic acid (VITAMIN C) 500 mg, oral, Daily    aspirin 81 mg, oral, Daily    atorvastatin (LIPITOR) 40 mg, oral, Nightly    carvedilol (COREG) 25 mg, oral, 2 times daily with meals    chlorthalidone (HYGROTON) 25 mg, oral, Daily    comp.stocking,thigh,long,large (EMS Anti-Embolism Stocking) misc 3 each, miscellaneous, Daily    fexofenadine (ALLEGRA ALLERGY) 180 mg, oral, Daily PRN    GaviLyte-G 236-22.74-6.74 -5.86 gram solution PLEASE SEE ATTACHED FOR DETAILED DIRECTIONS    loratadine (CLARITIN) 10 mg, oral, Daily    lubricating eye drops (polyvinyl alcohol-povidon,PF,) ophthalmic solution 1 drop, Both Eyes, As needed    nitroglycerin (NITROSTAT) 0.4 mg, sublingual, Every 5 min PRN    omeprazole (PRILOSEC) 40 mg, oral, 2 times daily before meals, Do not crush or chew.    phenazopyridine (PYRIDIUM) 200 mg, oral, 3 times daily PRN    spironolactone (ALDACTONE) 12.5 mg, oral, Daily    SUMAtriptan (IMITREX) 25 mg, oral, Daily PRN    tamsulosin (FLOMAX) 0.4 mg,  "oral, Daily    topiramate (TOPAMAX) 100 mg, oral, Daily     Last Recorded Vitals:  Vitals:    04/03/24 1012   BP: 136/78   Pulse: 76   SpO2: 95%   Weight: 87.5 kg (193 lb)     Physical Exam:      4/3/2024    10:12 AM 3/19/2024     3:00 PM 3/5/2024     4:35 PM 3/5/2024     3:38 PM 3/5/2024     3:27 PM 3/5/2024     2:30 PM   Vitals   Systolic 136 168 130 150 137 164   Diastolic 78 87 78 80 82 98   Heart Rate 76 63 70 60 63 71   Temp  36 °C (96.8 °F)  36.5 °C (97.7 °F)     Resp   18 19 16 11   Height (in)  1.6 m (5' 3\")       Weight (lb) 193 200.2       BMI 34.19 kg/m2 35.46 kg/m2       BSA (m2) 1.97 m2 2.01 m2       Visit Report Report Report         Wt Readings from Last 5 Encounters:   04/03/24 87.5 kg (193 lb)   03/19/24 90.8 kg (200 lb 3.2 oz)   03/05/24 86.2 kg (190 lb)   02/27/24 89.8 kg (198 lb)   02/21/24 90.7 kg (200 lb)     General: Sitting up comfortably in chair; in no apparent distress.  HEENT: Normocephalic; atraumatic. Well hydrated.  Eyes: Anicteric sclera. Extraocular movement intact.  Neck: Supple; no thyromegaly; normal jugular venous pressure, no bruits.  Respiratory: Bilateral air entry equal. No wheezing.  Cardiovascular: Normal S1, S2; no murmurs auscultated.  Abdomen: Nondistended; nontender. (+) bowel sounds.  Extremities: No peripheral edema present. Pulses 2+ diffusely.  Neurological: Oriented to time, place, and person; nonfocal.  Psychiatric: Normal affect.     Last Labs Reviewed:  CBC -  Recent Labs     03/05/24  0946 02/24/24  0636 02/23/24  0644 02/21/24  0432 11/13/23  2120   WBC 6.5 5.4 5.0 5.1 5.8   HGB 13.0 13.8 13.7 14.7 14.1   HCT 37.9 43.0 40.7 42.9 43.2    228 215 215 223   MCV 88 96 93 91 95     CMP -  Recent Labs     03/05/24  0946 02/24/24  0636 02/23/24  0644 02/21/24  0432 11/13/23  2120   * 139 140 138 129*   K 3.4* 3.6 3.9 3.8 4.2    102 103 103 92*   CO2 28 28 29 29 26   ANIONGAP 9* 13 12 10 11   BUN 10 15 17 14 8   CREATININE 0.74 0.92 0.95 0.77 0.70 "   EGFR 90 70 67 86 >90   MG 1.63 2.01 1.91 1.85 2.00   CALCIUM 9.8 10.0 9.7 9.6 9.8     Recent Labs     03/05/24  0946 02/24/24  0636 02/23/24  0644 02/21/24  0432 11/13/23  2120   ALBUMIN 3.7 3.8 3.6 4.1 4.0   ALKPHOS 74 75 72 72 108   ALT 11 11 11 12 14   AST 14 11 9 13 18   BILITOT 0.7 0.6 0.4 0.5 0.3   LIPASE  --   --   --   --  <16*     LIPID PANEL -   Recent Labs     09/13/23  1058 03/19/21  1614   CHOL 241* 210*   LDLF 152* 136*   HDL 67.6 55.7   TRIG 107 93     COAGULATION PANEL -  Recent Labs     11/02/23 2001 03/06/21  1716 05/29/20  1700   INR 0.9 1.0 0.9     HEME/ENDO -  Recent Labs     09/13/23  1058 03/19/21  1614   TSH 0.54 0.21*   HGBA1C 5.3  --    FREET4  --  1.25     CARDIOVASCULAR  Recent Labs     03/05/24  1049 03/05/24  0946 02/22/24  0935 02/21/24  0532 02/21/24  0432 03/06/21  1716 01/17/19  1611   TROPHS <3 <3 <3 <3 <3  --   --    BNP  --  19  --   --  3 34 12     Last Cardiology/Imaging Tests Personally Reviewed (if images available) and Interpreted:  ECG:  Encounter Date: 03/05/24   ECG 12 lead   Result Value    Ventricular Rate 67    Atrial Rate 67    KY Interval 160    QRS Duration 86    QT Interval 408    QTC Calculation(Bazett) 431    P Axis 60    R Axis -2    T Axis 54    QRS Count 11    Q Onset 214    P Onset 134    P Offset 195    T Offset 418    QTC Fredericia 423    Narrative    Normal sinus rhythm  Low voltage QRS  Poor R wave progression in precordials     Echocardiogram:  Transthoracic Echo Complete 02/23/2024  CONCLUSIONS:  1. Left ventricular systolic function is normal with a 55-60% estimated ejection fraction.  2. No regional wall motion abnormalities.    Cath:  No results found.    Stress Test:  No results found.    Cardiac CT/MRI:  CT angio coronary art with heartflow if score >30% 02/22/2024  1. Nonobstructive coronary artery disease involving the left main (minimal stenosis less than 25%), LAD and RCA (minimal to mild stenosis 30-40%). Coronary calcium score is 394.  2.  Diffuse peribronchial thickening and mucous plugging in the lower lobes.  3. Small hiatal hernia.    Other CT:  CT ABDOMEN PELVIS W IV CONTRAST; 11/3/2023   1. Profound constipation.   2. Severe calcifications of abdominal aorta and iliac arteries.   3. Large cyst in left kidney.    CV RISK FACTORS:   # Hypertension: Last BP: 136/78.  # Hyperlipidemia: Last Tchol 241 /  / HDL 67.6 / TRIG 107 (9/13/2023: 10:58 AM).  # Type II Diabetes Mellitus: Last A1c 5.3 (9/13/2023: 10:58 AM).  # Obesity: Last BMI:  .  # CKD: Last BUN/Cr (GFR): 10/0.74 (90), 3/5/2024:  9:46 AM.    ASCV RISK:  The 10-year ASCVD risk score (Missy WADE, et al., 2019) is: 9.4%    Values used to calculate the score:      Age: 63 years      Sex: Female      Is Non- : Yes      Diabetic: No      Tobacco smoker: No      Systolic Blood Pressure: 136 mmHg      Is BP treated: No      HDL Cholesterol: 67.6 mg/dL      Total Cholesterol: 241 mg/dL    Assessment/Plan   63 y.o. female, with history significant for mild CAD, GERD, hypertension, migraine, kidney stones, asthma, prior smoker, BMI 34, and dyslipidemia, who visits Cardiology today as a follow up visit  for chest pain.  She was recently discharged from the hospital with atypical chest pain. Pain appears more related to GERD and esophageal spasm. She has improved with diet change and losing weight. We discussed about diet and exercises/muscle mass. Also, she has hypokalemia and cramps related to thiazides and blood pressure is not well controlled. Regarding her HLD and CAD, her statin treatment was increased during her hospitalization as her last LDL was >100.     Recommendations:  - GERD/chest pain: continue working in a diet low in carbohydrates and sat fats, exercise and weight loss  - HTN: add spironolactone 12.5 mg and check CMP in a month  - HLD/CAD: check lipid panel in a month  - I will contact the patient once the results are available through [employer]  - I spent 40  minutes assessing the case between pre-charting, face-to-face patient interaction, and documentation    Mychal Cheng MD

## 2024-04-03 NOTE — PROGRESS NOTES
Reason for Nutrition Visit:  Pt is a 63 y.o. female being seen for initial apt at [facility] 6 referred for   1. Burning reflux  Referral to Nutrition Services         Past Medical Hx:  Patient Active Problem List   Diagnosis    Aftercare following joint replacement surgery    Presence of left artificial knee joint    Nausea in adult    OA (osteoarthritis) of knee    Primary osteoarthritis of left knee    Obesity, Class I, BMI 30-34.9    Renal mass    Spinal stenosis of lumbar region without neurogenic claudication    Mild intermittent asthma without complication    Migraine without aura and without status migrainosus, not intractable    Left nephrolithiasis    Left flank pain    Insect bite    Hypertension    Hyperlipidemia    History of hip replacement, total, bilateral    Hip joint replacement status    Goiter    Lumbago    Chest pain    Abscess of skin    Back pain    Valgus deformity, not elsewhere classified, unspecified site    Unspecified asthma, uncomplicated    Other abnormalities of gait and mobility    Anemia    Long term (current) use of inhaled steroids    Bug bites    Bumps on skin    Presence of artificial hip joint, bilateral    Personal history of nicotine dependence    Long term current use of opiate analgesic    History of falling    Essential (primary) hypertension    Kidney pain    Post-menopausal bleeding    Slow transit constipation      Lab Results   Component Value Date    HGBA1C 5.3 09/13/2023    CHOL 241 (H) 09/13/2023    LDLF 152 (H) 09/13/2023    TRIG 107 09/13/2023    HDL 67.6 09/13/2023      Food and Nutrition Hx:  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 She mentions she was hospitalized a month ago and they found blockage in her artery. She has been trying to lose weight and eat healthier, but hasn't been able to lose weight. She is trying to eat more plant based foods and finding plant based meat alternatives. She mentions her appetite has reduced the last few years. She typically eats 2-3  meals per day and might skip breakfast though may have cereal or oatmeal if she feels like it. She also likes to have activia sometimes. She mentions having more salt cravings and likes to snack on chips.    24 Diet Recall:  Meal 1: bowl of special k cereal with blueberries and almond milk  Meal 2: salad with turkey, lettuce, kale, spinach, tomatoes, onions, cucumbers and strawberries with balsamic vinaigrette and potato chips  Meal 3: 7 grilled garlic shrimp and lettuce, onions, cucumbers, kale, spinach and carrots  Beverages: 4-5 16 oz water bottles sometimes with liquid IV, 1 cup of coffee, 2 glasses of cranberry juice, no etoh    Allergies: None  Intolerance: can't drink whole milk without upsetting her stomach; can tolerate 2% milk  Appetite: Fluctuates  Intake: >75%  GI Symptoms : reflux, bloating, constipation, and mentions she used to have 3-4 BMs per day and now only has 2-3  Frequency: intermittent  Swallowing Difficulty: No problems with swallowing  Dentition : own    Types of Activities: Doing home exercises with small dumbbells; plus walks a lot at work  Duration: <30 minutes daily    Sleep duration/quality : 7+ hours and continuous sleep  Sleep disorders: none    Supplements: Multivitamin, Magnesium, Potassium, and Probiotic daily    Feelings of Hunger?: Yes and will eat  Physical Feeling: Physically full  Binging: Never  Cravings: Salty  Energy Levels: Stable    Food Preparation: Patient  Cooking Skills/Barriers: None reported  Grocery Shopping: Patient    Eating Out Type: Restaurant  1-2 times per month  Convenience Foods: Denies     Nutrition Focused Physical Exam:    Performed/Deferred: Deferred as pt visually appears well-nourished with no signs of malnutrition    Muscle Wasting:  Temporal: None  Shoulder: None  None  Interosseous Muscle: None  Quadriceps: None    Loss of Subcutaneous Fat:  Eyes: None  Perioral: None  Triceps: None  Chest: None    Other Physical Findings:  Hair: None  None  Mouth:  None  Skin: None  Nails: None  none    Malnutrition Present: No    Estimated Energy Needs:    Weight Loss Needs: 1468 kcal/day, MSJ: 1432x1.2-250, 72 g/pro/day, and .8 g/pro/kg/day    Nutrition Diagnosis:    Diagnosis Statement 1:  Diagnosis Status: New  Diagnosis : Food and nutrition related knowledge deficit related to lack of or limited prior nutrition-related education as evidenced by no prior knowledge of need for food- and nutrition-related recommendations    Diagnosis Statement 2:  Diagnosis Status: New  Diagnosis : Altered nutrition related lab values  related to  metabolic dysfunction  as evidenced by  high cholesterol (241) and LDL (152) on 09/13/2023.    Diagnosis Statement 3:   Diagnosis Status: New  Diagnosis : Inadequate fiber intake  related to food and nutrition related knowledge deficit concerning desirable quantities of fiber as evidenced by estimated intake of fiber that is insufficient when compared to recommended amounts (38 g/day for men and 25 g/day for women)    Nutrition Interventions:  Anti-Inflammatory Diet, Decreased Sodium Diet, Increased Soluble Fiber, Increased Omega-3 Diet, Increased Protein Diet, and Low Saturated Fat Diet  JenMonitoring&Evaluation: Estimated Energy Intake, Amount of Food - Estimated, Meal/Snack Pattern - Estimated, Estimated Protein Intake, Estimated Fiber Intake, Food and Nutrition Knowledge, and Food and Nutrition Skill  Nutrition Counseling: Motivational Interviewing and Goal Setting  Coordination of Care: None    Nutrition Goals:  Nutrition Goals : Adequate fluid intake: 64 oz daily  Consistent meal/snack pattern  Decrease intake of added sugars  Decrease intake of saturated fats  Decrease sodium intake  Increase awareness and respond to hunger cues  Increase awareness and respond to satiety cues  Reduce Kcal Intake  Reduce LDL level  Weight Loss    Nutrition Recommendations:  Via teach back method patient verbalized understanding of the following topics:  1)  Make a plate that is 1/2 filled with non-starchy vegetables (any vegetable other than potatoes, peas or corn), 1/4 filled with whole grain/starch and 1/4 lean protein. This will help increase fiber intake and keep you full after eating.  2) Discussed ways to increase protein at breakfast such as greek yogurt with cereal, adding nuts or seeds in oatmeal or a plant based protein shake on days pt doesn't want to eat breakfast.  3) Reviewed label reading for added sugars. Look for food items with less than 20% of daily value from added sugars or as close to 0%. Recommended swapping activia with a lower sugar greek yogurt.  4) Encouraged pt to add more plant based protein when eating plant based through beans, lentils, chickpeas, nuts, seeds, peanut butter, quinoa, tofu or plant based alternatives.    Educational Handouts: ADA Placemat and Roasted Tofu    Readiness to Change : Fair  Level of Understanding : Fair  Anticipated Compliant : Fair

## 2024-04-03 NOTE — PATIENT INSTRUCTIONS
1. When following a vegetarian or vegan diet it can be difficult to eat enough protein foods. Plant based sources may include nuts, seeds, tofu, tempeh, edamame, meat alternatives,   beans, lentils and peas. Eggs, milk, yogurt and cheese can provide protein for those following a vegetarian diet. Nut milks will provide less protein than regular cow's milk or soy milk.  2. A nutrient of concern on the vegan diet include vitamin B12 as animal products are typically the richest sources of these nutrients. Vitamin B-12 can be found in fortified breakfast cereals, some meat alternatives, soy burgers, nutritional yeast and fortified non-dairy milk. Recommend amounts of vitamin B12 is 2.4 mcg daily for adults.  3. Iron may not be well absorbed when eating non-heme iron sources from plant foods. Absorption can be increased with the addition of vitamin C to iron foods. Best sources of plant based iron foods include fortified cereals, beans (kidney, lima and navy, and white), lentils, pumpkin seeds, soy, spinach, tofu, or fish/seafood (if following lacto-ovo vegetarian diet). Recommend iron amounts for adult men is 8 mg and for women is 18 mg. Zinc can be found in nuts, seeds, fortified cereals, yogurt or cheese. Adult men should consume 11 mg and women 8 mg daily.  4. Calcium intake should be carefully planned. Many plant based sources of calcium and even fortified non-dairy milks are sometimes poorly absorbed due to the presence of other compounds like oxalate, phytates and fiber that decrease absorption. The recommended daily amounts of calcium for adults range from 1,000 to 1,200 mg. Vitamin D status is important for calcium absorption. If supplementation of calcium is needed, then a calcium supplement with vitamin D can enhance absorption.

## 2024-04-04 ENCOUNTER — PHARMACY VISIT (OUTPATIENT)
Dept: PHARMACY | Facility: CLINIC | Age: 64
End: 2024-04-04
Payer: COMMERCIAL

## 2024-04-18 ENCOUNTER — HOSPITAL ENCOUNTER (EMERGENCY)
Facility: HOSPITAL | Age: 64
Discharge: HOME | End: 2024-04-18
Payer: COMMERCIAL

## 2024-04-18 VITALS
BODY MASS INDEX: 33.66 KG/M2 | HEART RATE: 83 BPM | WEIGHT: 190 LBS | RESPIRATION RATE: 18 BRPM | HEIGHT: 63 IN | OXYGEN SATURATION: 100 % | DIASTOLIC BLOOD PRESSURE: 79 MMHG | TEMPERATURE: 98.2 F | SYSTOLIC BLOOD PRESSURE: 133 MMHG

## 2024-04-18 DIAGNOSIS — S39.012A LUMBAR STRAIN, INITIAL ENCOUNTER: Primary | ICD-10-CM

## 2024-04-18 LAB
APPEARANCE UR: CLEAR
BACTERIA #/AREA URNS AUTO: ABNORMAL /HPF
BILIRUB UR STRIP.AUTO-MCNC: NEGATIVE MG/DL
COLOR UR: YELLOW
GLUCOSE UR STRIP.AUTO-MCNC: NORMAL MG/DL
HOLD SPECIMEN: NORMAL
HYALINE CASTS #/AREA URNS AUTO: ABNORMAL /LPF
KETONES UR STRIP.AUTO-MCNC: NEGATIVE MG/DL
LEUKOCYTE ESTERASE UR QL STRIP.AUTO: ABNORMAL
MUCOUS THREADS #/AREA URNS AUTO: ABNORMAL /LPF
NITRITE UR QL STRIP.AUTO: NEGATIVE
PH UR STRIP.AUTO: 6.5 [PH]
PROT UR STRIP.AUTO-MCNC: NEGATIVE MG/DL
RBC # UR STRIP.AUTO: NEGATIVE /UL
RBC #/AREA URNS AUTO: ABNORMAL /HPF
SP GR UR STRIP.AUTO: 1.02
SQUAMOUS #/AREA URNS AUTO: ABNORMAL /HPF
UROBILINOGEN UR STRIP.AUTO-MCNC: NORMAL MG/DL
WBC #/AREA URNS AUTO: ABNORMAL /HPF

## 2024-04-18 PROCEDURE — 99283 EMERGENCY DEPT VISIT LOW MDM: CPT

## 2024-04-18 PROCEDURE — 96372 THER/PROPH/DIAG INJ SC/IM: CPT | Performed by: NURSE PRACTITIONER

## 2024-04-18 PROCEDURE — 2500000005 HC RX 250 GENERAL PHARMACY W/O HCPCS: Performed by: NURSE PRACTITIONER

## 2024-04-18 PROCEDURE — 2500000004 HC RX 250 GENERAL PHARMACY W/ HCPCS (ALT 636 FOR OP/ED): Performed by: NURSE PRACTITIONER

## 2024-04-18 PROCEDURE — 81001 URINALYSIS AUTO W/SCOPE: CPT | Performed by: NURSE PRACTITIONER

## 2024-04-18 PROCEDURE — 87086 URINE CULTURE/COLONY COUNT: CPT | Performed by: NURSE PRACTITIONER

## 2024-04-18 PROCEDURE — 99284 EMERGENCY DEPT VISIT MOD MDM: CPT | Performed by: NURSE PRACTITIONER

## 2024-04-18 RX ORDER — TIZANIDINE HYDROCHLORIDE 4 MG/1
4 CAPSULE, GELATIN COATED ORAL 3 TIMES DAILY
Qty: 30 CAPSULE | Refills: 0 | Status: SHIPPED | OUTPATIENT
Start: 2024-04-18 | End: 2024-04-28

## 2024-04-18 RX ORDER — KETOROLAC TROMETHAMINE 15 MG/ML
15 INJECTION, SOLUTION INTRAMUSCULAR; INTRAVENOUS ONCE
Status: COMPLETED | OUTPATIENT
Start: 2024-04-18 | End: 2024-04-18

## 2024-04-18 RX ORDER — IBUPROFEN 600 MG/1
600 TABLET ORAL EVERY 8 HOURS PRN
Qty: 30 TABLET | Refills: 0 | Status: SHIPPED | OUTPATIENT
Start: 2024-04-18 | End: 2024-04-28

## 2024-04-18 RX ORDER — ONDANSETRON 4 MG/1
4 TABLET, ORALLY DISINTEGRATING ORAL ONCE
Status: COMPLETED | OUTPATIENT
Start: 2024-04-18 | End: 2024-04-18

## 2024-04-18 RX ORDER — ACETAMINOPHEN 325 MG/1
650 TABLET ORAL EVERY 6 HOURS PRN
Qty: 30 TABLET | Refills: 0 | Status: SHIPPED | OUTPATIENT
Start: 2024-04-18 | End: 2024-04-28

## 2024-04-18 RX ORDER — ORPHENADRINE CITRATE 30 MG/ML
60 INJECTION INTRAMUSCULAR; INTRAVENOUS ONCE
Status: COMPLETED | OUTPATIENT
Start: 2024-04-18 | End: 2024-04-18

## 2024-04-18 RX ADMIN — KETOROLAC TROMETHAMINE 15 MG: 15 INJECTION, SOLUTION INTRAMUSCULAR; INTRAVENOUS at 08:37

## 2024-04-18 RX ADMIN — ORPHENADRINE CITRATE 60 MG: 60 INJECTION INTRAMUSCULAR; INTRAVENOUS at 08:36

## 2024-04-18 RX ADMIN — ONDANSETRON 4 MG: 4 TABLET, ORALLY DISINTEGRATING ORAL at 08:36

## 2024-04-18 ASSESSMENT — PAIN SCALES - GENERAL: PAINLEVEL_OUTOF10: 8

## 2024-04-18 ASSESSMENT — COLUMBIA-SUICIDE SEVERITY RATING SCALE - C-SSRS
6. HAVE YOU EVER DONE ANYTHING, STARTED TO DO ANYTHING, OR PREPARED TO DO ANYTHING TO END YOUR LIFE?: NO
1. IN THE PAST MONTH, HAVE YOU WISHED YOU WERE DEAD OR WISHED YOU COULD GO TO SLEEP AND NOT WAKE UP?: NO
2. HAVE YOU ACTUALLY HAD ANY THOUGHTS OF KILLING YOURSELF?: NO

## 2024-04-18 ASSESSMENT — PAIN - FUNCTIONAL ASSESSMENT: PAIN_FUNCTIONAL_ASSESSMENT: 0-10

## 2024-04-18 NOTE — ED TRIAGE NOTES
Pt states she got a sharp pain in L low back when she bent down to put shoes on this morning. She endorses nausea with the pain. She reports hx of spinal stenosis but states this feels different

## 2024-04-18 NOTE — ED PROVIDER NOTES
[facility]                                                  ED Provider Note    HPI   Chief Complaint   Patient presents with   • Back Pain     Juju Adams is a 63-year-old female with history of hypertension, dyslipidemia, CAD, osteoarthritis of left knee s/p total knee replacement, obesity, and lumbar stenosis at L4-5 with moderate neural foraminal impingement L5-S1 who presents to the emergency department with complaints of back pain.  Patient reports that this morning she bent over to put her shoes on and had a sudden onset of left sided back pain, pain is constant, rates 10/10, described as a sharp sensation, nonradiating, worsens with any movement, denies taking anything for her symptoms.  Patient denies feeling any popping sensation, denies dysuria, hematuria, history of kidney stones, extremity paresthesias, numbness or weakness, bowel or bladder dysfunction.  Patient does endorse nausea which she correlates due to the intense pain.  Patient denies fever, chills, headache, lightheadedness or dizziness, chest discomfort, palpitations, orthopnea, lower extremity edema, shortness of breath, wheezing, cough, abdominal pain, vomiting, diarrhea, constipation, or urinary symptoms.      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   • Other specified cough 02/18/2019    Post-viral cough syndrome   • Personal history of other diseases of the respiratory system 12/26/2017    History of acute bronchitis with bronchospasm   • Personal history of other diseases of the respiratory system 07/31/2018    History of acute sinusitis   • Personal history of other drug therapy 11/01/2016    History of influenza vaccination   • Personal history of other infectious and parasitic diseases  04/13/2015    History of herpes zoster     Past Surgical History:   Procedure Laterality Date   • TOTAL HIP ARTHROPLASTY  02/13/2014    Total Hip Replacement   • TUBAL LIGATION  01/04/2018    Tubal Ligation     Family History   Problem Relation Name Age of Onset   • Hypertension Mother     • Heart attack Mother     • Hypertension Father     • Heart attack Father     • Stroke Father     • Diabetes Sister     • Hypertension Sister     • Seizures Sister       Social History     Tobacco Use   • Smoking status: Former     Current packs/day: 0.50     Average packs/day: 0.5 packs/day for 40.0 years (20.0 ttl pk-yrs)     Types: Cigarettes     Passive exposure: Past   • Smokeless tobacco: Never   Vaping Use   • Vaping status: Never Used   Substance Use Topics   • Alcohol use: Not Currently   • Drug use: Never       Physical Exam   ED Triage Vitals [04/18/24 0805]   Temperature Heart Rate Respirations BP   36.8 °C (98.2 °F) 83 18 133/79      Pulse Ox Temp src Heart Rate Source Patient Position   100 % -- -- --      BP Location FiO2 (%)     -- 21 %     Physical Exam:  VS reviewed   GEN: Nontoxic appearing black female, apparent discomfort  HEENT: NCAT, PERRL. EOMI.   NECK: Supple, no lymphadenopathy  CHEST: Lungs clear to throughout, bilaterally. No wheezing, rhonchi, or rales  HEART: RRR, Normal S1, S2.  No murmurs/rubs/gallops.  ABD: Soft, non-surgical. No guarding, rebound tenderness or palpable mass.   BACK: Tenderness to palpation in the left mid to lower back, no ecchymosis, erythema, edema or rash. No CVA tenderness, bilaterally No scoliosis  EXT : Moving BUE and BLE at baseline. No clubbing, cyanosis or edema.  NEURO: Alert, oriented, and appropriately interactive. No focal neurological deficits.  Negative straight leg test bilaterally.  Stable gait observed.  PSYCH: Cooperative. Kempt .       ED Course & MDM   ED Course as of 04/18/24 1006   Thu Apr 18, 2024   0930 Urinalysis reveals bacteriuria, negative for  nitrites. [DL]      ED Course User Index  [DL] Filiberto Victoria, APRN-CNP         Diagnoses as of 04/18/24 1006   Lumbar strain, initial encounter       Medical Decision Making  Juju Adams is a 63-year-old female with history of hypertension, dyslipidemia, CAD, osteoarthritis of left knee s/p total knee replacement, obesity, and lumbar stenosis at L4-5 with moderate neural foraminal impingement L5-S1 who presents to the emergency department with complaints of back pain.  Reviewed prior encounter and current encounter documentation, laboratory studies, diagnostic results and interventions via EMR/Care Everywhere.  Reviewed vitals, stable and afebrile.  Physical exam as documented.  Tenderness to palpation of left mid to lower back, no ecchymosis, erythema, swelling, or rash appreciated.  Patient in obvious pain with movement.  Respiratory and cardiac exams are unrevealing.  Negative straight leg test bilaterally.  Abdomen benign. No CVA tenderness bilaterally.  Patient is nontoxic-appearing though is in apparent discomfort.  Given symptom presentation and physical exam findings musculoskeletal strain. Due to absence of trauma and lack of neurological symptoms, very low suspicion for spinal cord involvement. No emergent imaging warranted at this time.  Will obtain urinalysis to assess for infection or blood. Will obtain CT to rule out renal vs urolithiasis blood is present.  Symptoms initially managed with Zofran ODT, Toradol 15 mg IM, and Norflex 60 mg IM.  Please refer to ED course for laboratory study interpretation.  Patient reevaluated, endorsed improvement, requesting to be discharged as her ride is here to pick her up.  Voiced she is unable to wait for urine results.  Patient aware of risks with leaving prior to results, acknowledged.  States that she may need to return or will receive a call if findings are abnormal.  Given clinical presentation and urinalysis findings suspect asymptomatic bacteriuria.   I would recommend waiting for culture results before treating for UTI.  Patient will be notified with positive urine culture results.  Patient discharged home with recommendations to follow-up with her PCP in 1 week, take tizanidine, ibuprofen and Tylenol as needed for pain, strict return precautions were explained.  Patient acknowledged and amenable to plan.    Amount and/or Complexity of Data Reviewed  External Data Reviewed: labs, radiology and notes.  Labs: ordered. Decision-making details documented in ED Course.      Procedure  Procedures    Impression:  -Back strain  -Asymptomatic bacteriuria    Disposition:  -Discharge home  -1 week PCP follow-up    MICHELLE Sotelo III-CNP  [facility]   Emergency Medicine    Disclaimer: This note was dictated using a speech recognition program.  While an attempt was made at proof reading to minimize errors, minor errors in transcription may be present     MAGAN Murphy  04/18/24 1007

## 2024-04-18 NOTE — Clinical Note
Juju Adams was seen and treated in our emergency department on 4/18/2024.  She may return to work on 04/19/2024.  Ms. Adams may return to work as of 4/19/2024 without restrictions.  May need to take additional periods of rest to decrease pain.     If you have any questions or concerns, please don't hesitate to call.      Filiberto Victoria, APRN-CNP

## 2024-04-19 LAB — BACTERIA UR CULT: NORMAL

## 2024-05-07 PROBLEM — I10 ESSENTIAL HYPERTENSION: Status: RESOLVED | Noted: 2022-05-20 | Resolved: 2024-05-07

## 2024-05-31 ENCOUNTER — PHARMACY VISIT (OUTPATIENT)
Dept: PHARMACY | Facility: CLINIC | Age: 64
End: 2024-05-31
Payer: COMMERCIAL

## 2024-05-31 PROCEDURE — RXMED WILLOW AMBULATORY MEDICATION CHARGE

## 2024-06-14 ENCOUNTER — PHARMACY VISIT (OUTPATIENT)
Dept: PHARMACY | Facility: CLINIC | Age: 64
End: 2024-06-14
Payer: COMMERCIAL

## 2024-06-14 PROCEDURE — RXMED WILLOW AMBULATORY MEDICATION CHARGE

## 2024-07-16 ENCOUNTER — HOSPITAL ENCOUNTER (EMERGENCY)
Facility: HOSPITAL | Age: 64
Discharge: HOME | End: 2024-07-16
Attending: EMERGENCY MEDICINE
Payer: COMMERCIAL

## 2024-07-16 ENCOUNTER — CLINICAL SUPPORT (OUTPATIENT)
Dept: EMERGENCY MEDICINE | Facility: HOSPITAL | Age: 64
End: 2024-07-16
Payer: COMMERCIAL

## 2024-07-16 VITALS
RESPIRATION RATE: 18 BRPM | BODY MASS INDEX: 30.12 KG/M2 | TEMPERATURE: 99 F | SYSTOLIC BLOOD PRESSURE: 108 MMHG | HEIGHT: 63 IN | WEIGHT: 170 LBS | DIASTOLIC BLOOD PRESSURE: 69 MMHG | OXYGEN SATURATION: 98 % | HEART RATE: 78 BPM

## 2024-07-16 DIAGNOSIS — E87.6 HYPOKALEMIA: ICD-10-CM

## 2024-07-16 DIAGNOSIS — E87.1 HYPONATREMIA: ICD-10-CM

## 2024-07-16 DIAGNOSIS — E86.0 DEHYDRATION: Primary | ICD-10-CM

## 2024-07-16 DIAGNOSIS — R11.0 NAUSEA: ICD-10-CM

## 2024-07-16 LAB
ALBUMIN SERPL BCP-MCNC: 3.8 G/DL (ref 3.4–5)
ALBUMIN SERPL BCP-MCNC: 4.1 G/DL (ref 3.4–5)
ALP SERPL-CCNC: 78 U/L (ref 33–136)
ALT SERPL W P-5'-P-CCNC: 13 U/L (ref 7–45)
ANION GAP SERPL CALC-SCNC: 12 MMOL/L (ref 10–20)
ANION GAP SERPL CALC-SCNC: 13 MMOL/L (ref 10–20)
APPEARANCE UR: CLEAR
AST SERPL W P-5'-P-CCNC: 16 U/L (ref 9–39)
ATRIAL RATE: 67 BPM
BASOPHILS # BLD AUTO: 0.03 X10*3/UL (ref 0–0.1)
BASOPHILS NFR BLD AUTO: 0.6 %
BILIRUB SERPL-MCNC: 1 MG/DL (ref 0–1.2)
BILIRUB UR STRIP.AUTO-MCNC: NEGATIVE MG/DL
BUN SERPL-MCNC: 9 MG/DL (ref 6–23)
BUN SERPL-MCNC: 9 MG/DL (ref 6–23)
CALCIUM SERPL-MCNC: 9.4 MG/DL (ref 8.6–10.6)
CALCIUM SERPL-MCNC: 9.7 MG/DL (ref 8.6–10.6)
CHLORIDE SERPL-SCNC: 91 MMOL/L (ref 98–107)
CHLORIDE SERPL-SCNC: 93 MMOL/L (ref 98–107)
CHLORIDE UR-SCNC: 46 MMOL/L
CHLORIDE/CREATININE (MMOL/G) IN URINE: 197 MMOL/G CREAT (ref 38–318)
CO2 SERPL-SCNC: 26 MMOL/L (ref 21–32)
CO2 SERPL-SCNC: 28 MMOL/L (ref 21–32)
COLOR UR: COLORLESS
CREAT SERPL-MCNC: 0.71 MG/DL (ref 0.5–1.05)
CREAT SERPL-MCNC: 0.72 MG/DL (ref 0.5–1.05)
CREAT UR-MCNC: 23.3 MG/DL (ref 20–320)
EGFRCR SERPLBLD CKD-EPI 2021: >90 ML/MIN/1.73M*2
EGFRCR SERPLBLD CKD-EPI 2021: >90 ML/MIN/1.73M*2
EOSINOPHIL # BLD AUTO: 0.04 X10*3/UL (ref 0–0.7)
EOSINOPHIL NFR BLD AUTO: 0.8 %
ERYTHROCYTE [DISTWIDTH] IN BLOOD BY AUTOMATED COUNT: 12.4 % (ref 11.5–14.5)
GLUCOSE SERPL-MCNC: 101 MG/DL (ref 74–99)
GLUCOSE SERPL-MCNC: 82 MG/DL (ref 74–99)
GLUCOSE UR STRIP.AUTO-MCNC: NORMAL MG/DL
HCT VFR BLD AUTO: 39.4 % (ref 36–46)
HGB BLD-MCNC: 13.8 G/DL (ref 12–16)
IMM GRANULOCYTES # BLD AUTO: 0.01 X10*3/UL (ref 0–0.7)
IMM GRANULOCYTES NFR BLD AUTO: 0.2 % (ref 0–0.9)
KETONES UR STRIP.AUTO-MCNC: NEGATIVE MG/DL
LEUKOCYTE ESTERASE UR QL STRIP.AUTO: NEGATIVE
LIPASE SERPL-CCNC: 11 U/L (ref 9–82)
LYMPHOCYTES # BLD AUTO: 1.62 X10*3/UL (ref 1.2–4.8)
LYMPHOCYTES NFR BLD AUTO: 31.6 %
MAGNESIUM SERPL-MCNC: 1.79 MG/DL (ref 1.6–2.4)
MCH RBC QN AUTO: 30.7 PG (ref 26–34)
MCHC RBC AUTO-ENTMCNC: 35 G/DL (ref 32–36)
MCV RBC AUTO: 88 FL (ref 80–100)
MONOCYTES # BLD AUTO: 0.39 X10*3/UL (ref 0.1–1)
MONOCYTES NFR BLD AUTO: 7.6 %
NEUTROPHILS # BLD AUTO: 3.03 X10*3/UL (ref 1.2–7.7)
NEUTROPHILS NFR BLD AUTO: 59.2 %
NITRITE UR QL STRIP.AUTO: NEGATIVE
NRBC BLD-RTO: 0 /100 WBCS (ref 0–0)
P AXIS: 68 DEGREES
P OFFSET: 203 MS
P ONSET: 140 MS
PH UR STRIP.AUTO: 7 [PH]
PHOSPHATE SERPL-MCNC: 2.6 MG/DL (ref 2.5–4.9)
PHOSPHATE SERPL-MCNC: 3 MG/DL (ref 2.5–4.9)
PLATELET # BLD AUTO: 233 X10*3/UL (ref 150–450)
POTASSIUM SERPL-SCNC: 3.3 MMOL/L (ref 3.5–5.3)
POTASSIUM SERPL-SCNC: 3.7 MMOL/L (ref 3.5–5.3)
POTASSIUM UR-SCNC: 9 MMOL/L
POTASSIUM/CREAT UR-RTO: 39 MMOL/G CREAT
PR INTERVAL: 160 MS
PROT SERPL-MCNC: 7.3 G/DL (ref 6.4–8.2)
PROT UR STRIP.AUTO-MCNC: NEGATIVE MG/DL
Q ONSET: 220 MS
QRS COUNT: 10 BEATS
QRS DURATION: 84 MS
QT INTERVAL: 424 MS
QTC CALCULATION(BAZETT): 448 MS
QTC FREDERICIA: 440 MS
R AXIS: 10 DEGREES
RBC # BLD AUTO: 4.49 X10*6/UL (ref 4–5.2)
RBC # UR STRIP.AUTO: NEGATIVE /UL
SODIUM SERPL-SCNC: 128 MMOL/L (ref 136–145)
SODIUM SERPL-SCNC: 128 MMOL/L (ref 136–145)
SODIUM UR-SCNC: 51 MMOL/L
SODIUM/CREAT UR-RTO: 219 MMOL/G CREAT
SP GR UR STRIP.AUTO: 1
T AXIS: 55 DEGREES
T OFFSET: 432 MS
UROBILINOGEN UR STRIP.AUTO-MCNC: NORMAL MG/DL
VENTRICULAR RATE: 67 BPM
WBC # BLD AUTO: 5.1 X10*3/UL (ref 4.4–11.3)

## 2024-07-16 PROCEDURE — 83690 ASSAY OF LIPASE: CPT | Performed by: EMERGENCY MEDICINE

## 2024-07-16 PROCEDURE — 96361 HYDRATE IV INFUSION ADD-ON: CPT

## 2024-07-16 PROCEDURE — 82436 ASSAY OF URINE CHLORIDE: CPT | Performed by: EMERGENCY MEDICINE

## 2024-07-16 PROCEDURE — 36415 COLL VENOUS BLD VENIPUNCTURE: CPT

## 2024-07-16 PROCEDURE — 2500000004 HC RX 250 GENERAL PHARMACY W/ HCPCS (ALT 636 FOR OP/ED)

## 2024-07-16 PROCEDURE — 2500000005 HC RX 250 GENERAL PHARMACY W/O HCPCS

## 2024-07-16 PROCEDURE — 80307 DRUG TEST PRSMV CHEM ANLYZR: CPT | Performed by: NURSE PRACTITIONER

## 2024-07-16 PROCEDURE — 96375 TX/PRO/DX INJ NEW DRUG ADDON: CPT

## 2024-07-16 PROCEDURE — 99284 EMERGENCY DEPT VISIT MOD MDM: CPT | Performed by: EMERGENCY MEDICINE

## 2024-07-16 PROCEDURE — 85025 COMPLETE CBC W/AUTO DIFF WBC: CPT | Performed by: EMERGENCY MEDICINE

## 2024-07-16 PROCEDURE — 2500000002 HC RX 250 W HCPCS SELF ADMINISTERED DRUGS (ALT 637 FOR MEDICARE OP, ALT 636 FOR OP/ED)

## 2024-07-16 PROCEDURE — 80069 RENAL FUNCTION PANEL: CPT | Mod: CCI

## 2024-07-16 PROCEDURE — 84100 ASSAY OF PHOSPHORUS: CPT

## 2024-07-16 PROCEDURE — 36415 COLL VENOUS BLD VENIPUNCTURE: CPT | Performed by: EMERGENCY MEDICINE

## 2024-07-16 PROCEDURE — 99284 EMERGENCY DEPT VISIT MOD MDM: CPT

## 2024-07-16 PROCEDURE — 83735 ASSAY OF MAGNESIUM: CPT

## 2024-07-16 PROCEDURE — 80053 COMPREHEN METABOLIC PANEL: CPT | Performed by: EMERGENCY MEDICINE

## 2024-07-16 PROCEDURE — 93010 ELECTROCARDIOGRAM REPORT: CPT | Performed by: EMERGENCY MEDICINE

## 2024-07-16 PROCEDURE — 93005 ELECTROCARDIOGRAM TRACING: CPT

## 2024-07-16 PROCEDURE — 96365 THER/PROPH/DIAG IV INF INIT: CPT

## 2024-07-16 PROCEDURE — 81003 URINALYSIS AUTO W/O SCOPE: CPT | Performed by: EMERGENCY MEDICINE

## 2024-07-16 RX ORDER — ONDANSETRON 4 MG/1
4 TABLET, ORALLY DISINTEGRATING ORAL ONCE
Status: COMPLETED | OUTPATIENT
Start: 2024-07-16 | End: 2024-07-16

## 2024-07-16 RX ORDER — FAMOTIDINE 10 MG/ML
20 INJECTION INTRAVENOUS ONCE
Status: COMPLETED | OUTPATIENT
Start: 2024-07-16 | End: 2024-07-16

## 2024-07-16 RX ORDER — POTASSIUM CHLORIDE 14.9 MG/ML
20 INJECTION INTRAVENOUS
Status: DISPENSED | OUTPATIENT
Start: 2024-07-16 | End: 2024-07-16

## 2024-07-16 RX ORDER — ONDANSETRON 4 MG/1
4 TABLET, FILM COATED ORAL EVERY 6 HOURS
Qty: 12 TABLET | Refills: 0 | Status: SHIPPED | OUTPATIENT
Start: 2024-07-16 | End: 2024-07-19 | Stop reason: WASHOUT

## 2024-07-16 RX ORDER — ONDANSETRON HYDROCHLORIDE 2 MG/ML
4 INJECTION, SOLUTION INTRAVENOUS ONCE
Status: COMPLETED | OUTPATIENT
Start: 2024-07-16 | End: 2024-07-16

## 2024-07-16 RX ORDER — POTASSIUM CHLORIDE 20 MEQ/1
20 TABLET, EXTENDED RELEASE ORAL ONCE
Status: COMPLETED | OUTPATIENT
Start: 2024-07-16 | End: 2024-07-16

## 2024-07-16 RX ORDER — ONDANSETRON 4 MG/1
TABLET, ORALLY DISINTEGRATING ORAL
Status: DISCONTINUED
Start: 2024-07-16 | End: 2024-07-16 | Stop reason: HOSPADM

## 2024-07-16 ASSESSMENT — PAIN - FUNCTIONAL ASSESSMENT: PAIN_FUNCTIONAL_ASSESSMENT: 0-10

## 2024-07-16 ASSESSMENT — PAIN SCALES - GENERAL: PAINLEVEL_OUTOF10: 10 - WORST POSSIBLE PAIN

## 2024-07-16 NOTE — PROGRESS NOTES
Emergency Department Transition of Care Note       Signout   I received Juju Adams in signout from Dr. Price.  Please see the ED Provider Note for all HPI, PE and MDM up to the time of signout at 3 pm.  This is in addition to the primary record.    In brief Juju Adams is an 63 y.o. female presenting for evaluation of 2 days of abdominal pain and nausea vomiting that started after she ate and YOGURT. She has not been able to keep down much p.o. and feels very dehydrated.     At the time of signout we were awaiting:  Repeat RFP, p.o. challenge, completion of potassium replacement.    ED Course & Medical Decision Making   Medical Decision Making:  Under my care, patient is eating and drinking without nausea or vomiting.  20 mg of potassium was switched to oral given the patient is able to tolerate p.o. patient states that she is feeling some GERD-like symptoms and some nausea.  Repeat RFP showed a potassium of 3.7. Patient was given Pepcid and Zofran.  After the medication, patient is feeling much better and the nausea has resolved.  Patient is eating and drinking appropriately.  On reexamination, patient's belly is soft, nondistended, nontender.  Patient states that feels good to go home.  Patient was given prescription of Zofran along with strict precautions to return to emergency room if he has any new or worsening symptoms.    ED Course:  Diagnoses as of 07/16/24 1902   Dehydration   Hyponatremia   Hypokalemia   Nausea       Disposition   As a result of the work-up, the patient was discharged home.  she was informed of her diagnosis and instructed to come back with any concerns or worsening of condition.  she and was agreeable to the plan as discussed above.  she was given the opportunity to ask questions.  All of the patient's questions were answered.    Procedures   Procedures    Patient seen and discussed with ED attending physician.    Mackenzie Reina MD  Emergency Medicine

## 2024-07-16 NOTE — DISCHARGE INSTRUCTIONS
You were found to have low potassium and was given replacement.  Your labs look normal with exception for this low potassium.  You are given prescription for Zofran for the nausea.  Please return to emergency room if you have any new or worsening symptoms.

## 2024-07-16 NOTE — ED TRIAGE NOTES
Pt to ED with c/o abdominal pain, vomiting, dizziness, and general malaise since yesterday morning. Two nights ago, pt states she was having chest pain and took nitroglycerin which relieved her pain.     Hx of htn, CAD, dyslipidemia, osteoarthritis, and lumbar stenosis.

## 2024-07-16 NOTE — ED PROVIDER NOTES
History of Present Illness     History provided by: Patient  Limitations to History: None  External Records Reviewed with Brief Summary:  Outpatient labs and notes reviewed and cardiac history including recent echoes from 2024 with normal cardiac function, recent EKG and stress test from 2021 which was normal.    HPI:  Juju Adams is a 63 y.o. female presents for evaluation of 2 days of nausea and vomiting that started after she ate an old yogurt.  She reports that the yogurt tasted off when she ate it and her symptoms started approximately 30 minutes after eating the yogurt. She reports nausea and vomiting. She has not been able to keep down much p.o. intake and feels very dehydrated.  In contrast to the triage note, the patient denies any abdominal pain or dizziness. No trauma, falls, or injuries. No passing out or near-syncope. She had an episode of chest pain a few days ago that responded to her home nitroglycerin.  She has not had any chest pain since developing her nausea and vomiting. She does not have any chest pain now. No cough or congestion. No headache, vision changes, neck pain, back pain, shortness of breath,  abdominal pain, diarrhea, constipation, urinary symptoms, numbness, tingling, fevers, or chills. No sick contacts or recent travels. No one else with similar symptoms.    Physical Exam   Triage vitals:  T 36.6 °C (97.9 °F)  HR 81  /84  RR 20  O2 95 % None (Room air)    General: Awake, alert, oriented, in no acute distress. Resting calmly.  Eyes: Gaze conjugate.  No scleral icterus or injection. EOMI.   HENT: Normo-cephalic, atraumatic. No stridor.  Dry mucous membranes  Neck: supple, full ROM intact.  CV: Regular rate, regular rhythm. Radial pulses 2+ bilaterally  Resp: Breathing non-labored, speaking in full sentences.  Clear to auscultation bilaterally  GI: Soft, non-distended, non-tender. No rebound or guarding.  Negative Cruz's sign. No peritoneal signs.   MSK/Extremities:  No gross bony deformities. Moving all extremities with good tone and ROM intact.  Skin: Warm. Appropriate color  Neuro: Alert. Oriented. Face symmetric. Speech is fluent.  Gross strength and sensation intact in b/l UE and LEs  Psych: Appropriate mood and affect      Medical Decision Making & ED Course   Medical Decision Makin y.o. female who presents for evaluation of nausea and vomiting with dehydration.  She is afebrile, hemodynamically stable upon arrival though appears clinically dehydrated.  The patient is in no respiratory distress, satting well on room air. Will give IV fluids and IV zofran and check electrolytes.  Patient has soft, nontender, non-peritonitic abdominal exam. She denies any current chest pain, abdominal pain, or shortness of breath. CT abdomen/pelvis was considered but not felt to be indicated at this time given lack of abdominal pain and with patient's soft, nontender, non-distended abdominal exam. Patient is neurologically intact with no focal deficits. No ataxia. On evaluation of her labs, the patient does have hyponatremia hypochloremia and hypokalemia that appear acute, most likely in the setting of dehydration.  Due to patient's inability to tolerate p.o. will attempt first IV potassium repletion as well as given IV fluids and plan to recheck RFP for electrolyte normalization.  Patient was given Zofran with significant improvement in her symptoms and was able to tolerate p.o.  She did receive an EKG which shows no significant changes from previous and given no current chest pain or chest pain throughout these episodes I do not believe that this is an atypical ACS presentation. Patient was offered admission to the hospital for observation and further monitoring but she reports that she is scheduled to go on vacation with her family tomorrow and wants to try to avoid being admitted.  ----      Differential diagnoses considered include but are not limited to: Dehydration, food  poisoning, gastroenteritis, colitis, cholecystitis, pancreatitis, glycemic derangement, electrolyte derangement, infection, low suspicion for atypical presentation of ACS.     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    EKG Independent Interpretation: EKG interpreted by myself. Please see ED Course and MDM for full interpretation.    Independent Result Review and Interpretation: Results were independently reviewed and interpreted by myself. Please see ED course and [facility] for full interpretation.    Chronic conditions affecting the patient's care: As documented in the MDM    The patient was discussed with the following consultants/services: None    Care Considerations: As per [facility]    ED Course:  ED Course as of 07/17/24 2324 Tue Jul 16, 2024   1400 EKG independently interpreted with sinus rhythm  Heart rate 67 bpm  OH interval 160 MS QRS 84 MS QTc 448 MS within normal limits.  No acute ST segment elevations or T wave inversions concerning for acute ischemia, no arrhythmia or heart block.  No significant change from previous. [SC]      ED Course User Index  [SC] Lise Price DO         Diagnoses as of 07/17/24 2324   Dehydration   Hyponatremia   Hypokalemia   Nausea     Disposition   Patient was signed out to Dr. Munoz at 1500 pending completion of their work-up and pending remainder of ED course and final disposition.  Please see the next provider's transition of care note for the remainder of the patient's care. Patient remains stable at time of sign-out.    Procedures   Procedures    Patient seen and discussed with ED attending physician. Dr Flory Price DO  Emergency Medicine      Lise Price DO  Resident  07/17/24 2325    I saw and evaluated the patient. I personally obtained the key and critical  portions of the history and physical exam or was physically present for key and critical portions performed by the resident/fellow. I reviewed the resident/fellow's documentation and discussed the patient with the resident/fellow. I agree with the resident/fellow's medical decision making as documented in the note.    MD Lesly Medellin MD  07/18/24 9857

## 2024-07-17 LAB — HOLD SPECIMEN: NORMAL

## 2024-07-17 ASSESSMENT — HEART SCORE
AGE: 45-64
HISTORY: SLIGHTLY SUSPICIOUS
RISK FACTORS: 1-2 RISK FACTORS
ECG: NORMAL

## 2024-07-19 ENCOUNTER — APPOINTMENT (OUTPATIENT)
Dept: RADIOLOGY | Facility: HOSPITAL | Age: 64
End: 2024-07-19
Payer: COMMERCIAL

## 2024-07-19 ENCOUNTER — CLINICAL SUPPORT (OUTPATIENT)
Dept: EMERGENCY MEDICINE | Facility: HOSPITAL | Age: 64
End: 2024-07-19
Payer: COMMERCIAL

## 2024-07-19 ENCOUNTER — HOSPITAL ENCOUNTER (OUTPATIENT)
Facility: HOSPITAL | Age: 64
Setting detail: OBSERVATION
Discharge: HOME | End: 2024-07-20
Attending: EMERGENCY MEDICINE | Admitting: NURSE PRACTITIONER
Payer: COMMERCIAL

## 2024-07-19 DIAGNOSIS — R11.2 NAUSEA AND VOMITING, UNSPECIFIED VOMITING TYPE: ICD-10-CM

## 2024-07-19 DIAGNOSIS — R12 BURNING REFLUX: ICD-10-CM

## 2024-07-19 DIAGNOSIS — E87.1 HYPONATREMIA: Primary | ICD-10-CM

## 2024-07-19 DIAGNOSIS — R10.9 ABDOMINAL PAIN, UNSPECIFIED ABDOMINAL LOCATION: ICD-10-CM

## 2024-07-19 PROBLEM — N23 KIDNEY PAIN: Status: RESOLVED | Noted: 2023-10-02 | Resolved: 2024-07-19

## 2024-07-19 PROBLEM — M54.50 LUMBAGO: Status: RESOLVED | Noted: 2023-10-02 | Resolved: 2024-07-19

## 2024-07-19 PROBLEM — Z79.891 LONG TERM CURRENT USE OF OPIATE ANALGESIC: Status: RESOLVED | Noted: 2022-05-20 | Resolved: 2024-07-19

## 2024-07-19 PROBLEM — Z79.51 LONG TERM (CURRENT) USE OF INHALED STEROIDS: Status: RESOLVED | Noted: 2022-05-20 | Resolved: 2024-07-19

## 2024-07-19 PROBLEM — Z96.649 HIP JOINT REPLACEMENT STATUS: Status: RESOLVED | Noted: 2023-10-02 | Resolved: 2024-07-19

## 2024-07-19 PROBLEM — Z91.81 HISTORY OF FALLING: Status: RESOLVED | Noted: 2022-05-20 | Resolved: 2024-07-19

## 2024-07-19 PROBLEM — E66.9 OBESITY, CLASS I, BMI 30-34.9: Status: RESOLVED | Noted: 2023-10-02 | Resolved: 2024-07-19

## 2024-07-19 PROBLEM — R11.0 NAUSEA IN ADULT: Status: RESOLVED | Noted: 2023-10-02 | Resolved: 2024-07-19

## 2024-07-19 PROBLEM — N95.0 POST-MENOPAUSAL BLEEDING: Status: RESOLVED | Noted: 2023-11-08 | Resolved: 2024-07-19

## 2024-07-19 PROBLEM — M54.9 BACK PAIN: Status: RESOLVED | Noted: 2023-10-02 | Resolved: 2024-07-19

## 2024-07-19 PROBLEM — N20.0 LEFT NEPHROLITHIASIS: Status: RESOLVED | Noted: 2023-10-02 | Resolved: 2024-07-19

## 2024-07-19 PROBLEM — J45.909 UNSPECIFIED ASTHMA, UNCOMPLICATED (HHS-HCC): Status: RESOLVED | Noted: 2022-05-20 | Resolved: 2024-07-19
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
## 2024-07-19 PROBLEM — E66.811 OBESITY, CLASS I, BMI 30-34.9: Status: RESOLVED | Noted: 2023-10-02 | Resolved: 2024-07-19

## 2024-07-19 PROBLEM — M17.12 PRIMARY OSTEOARTHRITIS OF LEFT KNEE: Status: RESOLVED | Noted: 2022-05-20 | Resolved: 2024-07-19

## 2024-07-19 PROBLEM — L98.9 BUMPS ON SKIN: Status: RESOLVED | Noted: 2023-10-02 | Resolved: 2024-07-19

## 2024-07-19 PROBLEM — Z96.643 PRESENCE OF ARTIFICIAL HIP JOINT, BILATERAL: Status: RESOLVED | Noted: 2022-05-20 | Resolved: 2024-07-19

## 2024-07-19 PROBLEM — L02.91 ABSCESS OF SKIN: Status: RESOLVED | Noted: 2023-10-02 | Resolved: 2024-07-19

## 2024-07-19 PROBLEM — S39.012A LUMBAR STRAIN, INITIAL ENCOUNTER: Status: RESOLVED | Noted: 2024-04-18 | Resolved: 2024-07-19
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
## 2024-07-19 PROBLEM — R07.9 CHEST PAIN: Status: RESOLVED | Noted: 2023-10-02 | Resolved: 2024-07-19

## 2024-07-19 PROBLEM — Z47.1 AFTERCARE FOLLOWING JOINT REPLACEMENT SURGERY: Status: RESOLVED | Noted: 2022-05-20 | Resolved: 2024-07-19

## 2024-07-19 PROBLEM — M21.00: Status: RESOLVED | Noted: 2022-05-20 | Resolved: 2024-07-19

## 2024-07-19 PROBLEM — R26.89 OTHER ABNORMALITIES OF GAIT AND MOBILITY: Status: RESOLVED | Noted: 2023-10-02 | Resolved: 2024-07-19

## 2024-07-19 LAB
ALBUMIN SERPL BCP-MCNC: 4.3 G/DL (ref 3.4–5)
ALP SERPL-CCNC: 75 U/L (ref 33–136)
ALT SERPL W P-5'-P-CCNC: 17 U/L (ref 7–45)
AMPHETAMINES UR QL SCN: ABNORMAL
AMPHETAMINES UR QL SCN: ABNORMAL
ANION GAP SERPL CALC-SCNC: 15 MMOL/L (ref 10–20)
APPEARANCE UR: ABNORMAL
AST SERPL W P-5'-P-CCNC: 18 U/L (ref 9–39)
BACTERIA #/AREA URNS AUTO: ABNORMAL /HPF
BARBITURATES UR QL SCN: ABNORMAL
BARBITURATES UR QL SCN: ABNORMAL
BASOPHILS # BLD AUTO: 0.04 X10*3/UL (ref 0–0.1)
BASOPHILS NFR BLD AUTO: 0.8 %
BENZODIAZ UR QL SCN: ABNORMAL
BENZODIAZ UR QL SCN: ABNORMAL
BILIRUB SERPL-MCNC: 1 MG/DL (ref 0–1.2)
BILIRUB UR STRIP.AUTO-MCNC: NEGATIVE MG/DL
BUN SERPL-MCNC: 8 MG/DL (ref 6–23)
BZE UR QL SCN: ABNORMAL
BZE UR QL SCN: ABNORMAL
CALCIUM SERPL-MCNC: 9.9 MG/DL (ref 8.6–10.6)
CANNABINOIDS UR QL SCN: ABNORMAL
CANNABINOIDS UR QL SCN: ABNORMAL
CARDIAC TROPONIN I PNL SERPL HS: <3 NG/L (ref 0–34)
CHLORIDE SERPL-SCNC: 89 MMOL/L (ref 98–107)
CHLORIDE UR-SCNC: 73 MMOL/L
CHLORIDE/CREATININE (MMOL/G) IN URINE: 50 MMOL/G CREAT (ref 38–318)
CO2 SERPL-SCNC: 26 MMOL/L (ref 21–32)
COLOR UR: ABNORMAL
CREAT SERPL-MCNC: 0.71 MG/DL (ref 0.5–1.05)
CREAT UR-MCNC: 146.4 MG/DL (ref 20–320)
EGFRCR SERPLBLD CKD-EPI 2021: >90 ML/MIN/1.73M*2
EOSINOPHIL # BLD AUTO: 0.1 X10*3/UL (ref 0–0.7)
EOSINOPHIL NFR BLD AUTO: 2.1 %
ERYTHROCYTE [DISTWIDTH] IN BLOOD BY AUTOMATED COUNT: 12.1 % (ref 11.5–14.5)
FENTANYL+NORFENTANYL UR QL SCN: ABNORMAL
FENTANYL+NORFENTANYL UR QL SCN: ABNORMAL
GLUCOSE SERPL-MCNC: 89 MG/DL (ref 74–99)
GLUCOSE UR STRIP.AUTO-MCNC: NORMAL MG/DL
HCT VFR BLD AUTO: 41.4 % (ref 36–46)
HGB BLD-MCNC: 15 G/DL (ref 12–16)
IMM GRANULOCYTES # BLD AUTO: 0.01 X10*3/UL (ref 0–0.7)
IMM GRANULOCYTES NFR BLD AUTO: 0.2 % (ref 0–0.9)
KETONES UR STRIP.AUTO-MCNC: ABNORMAL MG/DL
LACTATE SERPL-SCNC: 0.9 MMOL/L (ref 0.4–2)
LEUKOCYTE ESTERASE UR QL STRIP.AUTO: ABNORMAL
LIPASE SERPL-CCNC: 4 U/L (ref 9–82)
LYMPHOCYTES # BLD AUTO: 1.87 X10*3/UL (ref 1.2–4.8)
LYMPHOCYTES NFR BLD AUTO: 39 %
MCH RBC QN AUTO: 31.3 PG (ref 26–34)
MCHC RBC AUTO-ENTMCNC: 36.2 G/DL (ref 32–36)
MCV RBC AUTO: 86 FL (ref 80–100)
METHADONE UR QL SCN: ABNORMAL
METHADONE UR QL SCN: ABNORMAL
MONOCYTES # BLD AUTO: 0.47 X10*3/UL (ref 0.1–1)
MONOCYTES NFR BLD AUTO: 9.8 %
MUCOUS THREADS #/AREA URNS AUTO: ABNORMAL /LPF
NEUTROPHILS # BLD AUTO: 2.31 X10*3/UL (ref 1.2–7.7)
NEUTROPHILS NFR BLD AUTO: 48.1 %
NITRITE UR QL STRIP.AUTO: NEGATIVE
NRBC BLD-RTO: 0 /100 WBCS (ref 0–0)
OPIATES UR QL SCN: ABNORMAL
OPIATES UR QL SCN: ABNORMAL
OXYCODONE+OXYMORPHONE UR QL SCN: ABNORMAL
OXYCODONE+OXYMORPHONE UR QL SCN: ABNORMAL
PCP UR QL SCN: ABNORMAL
PCP UR QL SCN: ABNORMAL
PH UR STRIP.AUTO: 6.5 [PH]
PLATELET # BLD AUTO: 225 X10*3/UL (ref 150–450)
POTASSIUM SERPL-SCNC: 3.9 MMOL/L (ref 3.5–5.3)
POTASSIUM UR-SCNC: 45 MMOL/L
POTASSIUM/CREAT UR-RTO: 31 MMOL/G CREAT
PROT SERPL-MCNC: 7.3 G/DL (ref 6.4–8.2)
PROT UR STRIP.AUTO-MCNC: ABNORMAL MG/DL
RBC # BLD AUTO: 4.79 X10*6/UL (ref 4–5.2)
RBC # UR STRIP.AUTO: NEGATIVE /UL
RBC #/AREA URNS AUTO: ABNORMAL /HPF
SODIUM SERPL-SCNC: 126 MMOL/L (ref 136–145)
SODIUM UR-SCNC: 72 MMOL/L
SODIUM UR-SCNC: 72 MMOL/L
SODIUM/CREAT UR-RTO: 49 MMOL/G CREAT
SODIUM/CREAT UR-RTO: 49 MMOL/G CREAT
SP GR UR STRIP.AUTO: 1.02
SQUAMOUS #/AREA URNS AUTO: ABNORMAL /HPF
UROBILINOGEN UR STRIP.AUTO-MCNC: NORMAL MG/DL
WBC # BLD AUTO: 4.8 X10*3/UL (ref 4.4–11.3)
WBC #/AREA URNS AUTO: ABNORMAL /HPF

## 2024-07-19 PROCEDURE — C9113 INJ PANTOPRAZOLE SODIUM, VIA: HCPCS | Performed by: EMERGENCY MEDICINE

## 2024-07-19 PROCEDURE — 96361 HYDRATE IV INFUSION ADD-ON: CPT

## 2024-07-19 PROCEDURE — G0378 HOSPITAL OBSERVATION PER HR: HCPCS

## 2024-07-19 PROCEDURE — 99285 EMERGENCY DEPT VISIT HI MDM: CPT | Mod: 25

## 2024-07-19 PROCEDURE — 96375 TX/PRO/DX INJ NEW DRUG ADDON: CPT

## 2024-07-19 PROCEDURE — 96374 THER/PROPH/DIAG INJ IV PUSH: CPT | Mod: 59

## 2024-07-19 PROCEDURE — 96376 TX/PRO/DX INJ SAME DRUG ADON: CPT

## 2024-07-19 PROCEDURE — 2500000001 HC RX 250 WO HCPCS SELF ADMINISTERED DRUGS (ALT 637 FOR MEDICARE OP): Performed by: NURSE PRACTITIONER

## 2024-07-19 PROCEDURE — 85025 COMPLETE CBC W/AUTO DIFF WBC: CPT | Performed by: EMERGENCY MEDICINE

## 2024-07-19 PROCEDURE — 74177 CT ABD & PELVIS W/CONTRAST: CPT | Performed by: STUDENT IN AN ORGANIZED HEALTH CARE EDUCATION/TRAINING PROGRAM

## 2024-07-19 PROCEDURE — 36415 COLL VENOUS BLD VENIPUNCTURE: CPT | Performed by: EMERGENCY MEDICINE

## 2024-07-19 PROCEDURE — 87086 URINE CULTURE/COLONY COUNT: CPT | Performed by: EMERGENCY MEDICINE

## 2024-07-19 PROCEDURE — 83605 ASSAY OF LACTIC ACID: CPT | Performed by: EMERGENCY MEDICINE

## 2024-07-19 PROCEDURE — 2550000001 HC RX 255 CONTRASTS: Performed by: EMERGENCY MEDICINE

## 2024-07-19 PROCEDURE — 81001 URINALYSIS AUTO W/SCOPE: CPT | Performed by: EMERGENCY MEDICINE

## 2024-07-19 PROCEDURE — 83690 ASSAY OF LIPASE: CPT | Performed by: EMERGENCY MEDICINE

## 2024-07-19 PROCEDURE — 99223 1ST HOSP IP/OBS HIGH 75: CPT | Performed by: NURSE PRACTITIONER

## 2024-07-19 PROCEDURE — 74177 CT ABD & PELVIS W/CONTRAST: CPT

## 2024-07-19 PROCEDURE — 80053 COMPREHEN METABOLIC PANEL: CPT | Performed by: EMERGENCY MEDICINE

## 2024-07-19 PROCEDURE — 80307 DRUG TEST PRSMV CHEM ANLYZR: CPT

## 2024-07-19 PROCEDURE — 82570 ASSAY OF URINE CREATININE: CPT | Performed by: NURSE PRACTITIONER

## 2024-07-19 PROCEDURE — 96372 THER/PROPH/DIAG INJ SC/IM: CPT | Performed by: NURSE PRACTITIONER

## 2024-07-19 PROCEDURE — 2500000004 HC RX 250 GENERAL PHARMACY W/ HCPCS (ALT 636 FOR OP/ED): Performed by: NURSE PRACTITIONER

## 2024-07-19 PROCEDURE — 82570 ASSAY OF URINE CREATININE: CPT | Mod: 59 | Performed by: NURSE PRACTITIONER

## 2024-07-19 PROCEDURE — 2500000004 HC RX 250 GENERAL PHARMACY W/ HCPCS (ALT 636 FOR OP/ED)

## 2024-07-19 PROCEDURE — 84484 ASSAY OF TROPONIN QUANT: CPT | Performed by: EMERGENCY MEDICINE

## 2024-07-19 PROCEDURE — 2500000004 HC RX 250 GENERAL PHARMACY W/ HCPCS (ALT 636 FOR OP/ED): Performed by: EMERGENCY MEDICINE

## 2024-07-19 PROCEDURE — 93005 ELECTROCARDIOGRAM TRACING: CPT

## 2024-07-19 PROCEDURE — 82436 ASSAY OF URINE CHLORIDE: CPT | Performed by: NURSE PRACTITIONER

## 2024-07-19 RX ORDER — CARVEDILOL 25 MG/1
25 TABLET ORAL
Status: DISCONTINUED | OUTPATIENT
Start: 2024-07-19 | End: 2024-07-20 | Stop reason: HOSPADM

## 2024-07-19 RX ORDER — METOCLOPRAMIDE HYDROCHLORIDE 5 MG/ML
INJECTION INTRAMUSCULAR; INTRAVENOUS
Status: COMPLETED
Start: 2024-07-19 | End: 2024-07-19

## 2024-07-19 RX ORDER — ENOXAPARIN SODIUM 100 MG/ML
40 INJECTION SUBCUTANEOUS EVERY 24 HOURS
Status: DISCONTINUED | OUTPATIENT
Start: 2024-07-19 | End: 2024-07-20 | Stop reason: HOSPADM

## 2024-07-19 RX ORDER — PANTOPRAZOLE SODIUM 40 MG/1
40 TABLET, DELAYED RELEASE ORAL
Status: DISCONTINUED | OUTPATIENT
Start: 2024-07-20 | End: 2024-07-20 | Stop reason: HOSPADM

## 2024-07-19 RX ORDER — ALBUTEROL SULFATE 90 UG/1
1-2 AEROSOL, METERED RESPIRATORY (INHALATION) EVERY 6 HOURS PRN
COMMUNITY

## 2024-07-19 RX ORDER — ACETAMINOPHEN 325 MG/1
975 TABLET ORAL EVERY 8 HOURS
Status: DISCONTINUED | OUTPATIENT
Start: 2024-07-19 | End: 2024-07-20

## 2024-07-19 RX ORDER — ONDANSETRON HYDROCHLORIDE 2 MG/ML
4 INJECTION, SOLUTION INTRAVENOUS ONCE
Status: COMPLETED | OUTPATIENT
Start: 2024-07-19 | End: 2024-07-19

## 2024-07-19 RX ORDER — ATORVASTATIN CALCIUM 40 MG/1
40 TABLET, FILM COATED ORAL NIGHTLY
Status: DISCONTINUED | OUTPATIENT
Start: 2024-07-19 | End: 2024-07-20 | Stop reason: HOSPADM

## 2024-07-19 RX ORDER — DIPHENHYDRAMINE HYDROCHLORIDE 50 MG/ML
25 INJECTION INTRAMUSCULAR; INTRAVENOUS ONCE
Status: COMPLETED | OUTPATIENT
Start: 2024-07-19 | End: 2024-07-19

## 2024-07-19 RX ORDER — PANTOPRAZOLE SODIUM 40 MG/10ML
40 INJECTION, POWDER, LYOPHILIZED, FOR SOLUTION INTRAVENOUS ONCE
Status: COMPLETED | OUTPATIENT
Start: 2024-07-19 | End: 2024-07-19

## 2024-07-19 RX ORDER — ASPIRIN 81 MG/1
81 TABLET ORAL DAILY
Status: DISCONTINUED | OUTPATIENT
Start: 2024-07-19 | End: 2024-07-20 | Stop reason: HOSPADM

## 2024-07-19 RX ORDER — METOCLOPRAMIDE 10 MG/1
10 TABLET ORAL EVERY 6 HOURS SCHEDULED
Status: DISCONTINUED | OUTPATIENT
Start: 2024-07-19 | End: 2024-07-20 | Stop reason: HOSPADM

## 2024-07-19 RX ORDER — ONDANSETRON 4 MG/1
4 TABLET, FILM COATED ORAL EVERY 6 HOURS PRN
COMMUNITY
End: 2024-07-20 | Stop reason: HOSPADM

## 2024-07-19 RX ORDER — SODIUM CHLORIDE 9 MG/ML
100 INJECTION, SOLUTION INTRAVENOUS CONTINUOUS
Status: DISCONTINUED | OUTPATIENT
Start: 2024-07-19 | End: 2024-07-20 | Stop reason: HOSPADM

## 2024-07-19 RX ORDER — ASCORBIC ACID 500 MG
500 TABLET ORAL DAILY
COMMUNITY

## 2024-07-19 RX ORDER — METOCLOPRAMIDE HYDROCHLORIDE 5 MG/ML
10 INJECTION INTRAMUSCULAR; INTRAVENOUS ONCE
Status: COMPLETED | OUTPATIENT
Start: 2024-07-19 | End: 2024-07-19

## 2024-07-19 RX ORDER — HYDROMORPHONE HYDROCHLORIDE 1 MG/ML
0.5 INJECTION, SOLUTION INTRAMUSCULAR; INTRAVENOUS; SUBCUTANEOUS ONCE
Status: COMPLETED | OUTPATIENT
Start: 2024-07-19 | End: 2024-07-19

## 2024-07-19 RX ORDER — KETOROLAC TROMETHAMINE 15 MG/ML
15 INJECTION, SOLUTION INTRAMUSCULAR; INTRAVENOUS ONCE
Status: COMPLETED | OUTPATIENT
Start: 2024-07-19 | End: 2024-07-19

## 2024-07-19 RX ORDER — CHLORTHALIDONE 25 MG/1
25 TABLET ORAL DAILY
Status: DISCONTINUED | OUTPATIENT
Start: 2024-07-19 | End: 2024-07-20 | Stop reason: HOSPADM

## 2024-07-19 RX ORDER — KETOROLAC TROMETHAMINE 15 MG/ML
15 INJECTION, SOLUTION INTRAMUSCULAR; INTRAVENOUS EVERY 6 HOURS PRN
Status: DISCONTINUED | OUTPATIENT
Start: 2024-07-19 | End: 2024-07-20

## 2024-07-19 RX ORDER — ONDANSETRON HYDROCHLORIDE 2 MG/ML
4 INJECTION, SOLUTION INTRAVENOUS EVERY 8 HOURS PRN
Status: DISCONTINUED | OUTPATIENT
Start: 2024-07-19 | End: 2024-07-20 | Stop reason: HOSPADM

## 2024-07-19 RX ORDER — FEXOFENADINE HCL AND PSEUDOEPHEDRINE HCL 180; 240 MG/1; MG/1
1 TABLET, EXTENDED RELEASE ORAL DAILY PRN
COMMUNITY

## 2024-07-19 RX ORDER — AMOXICILLIN 250 MG
2 CAPSULE ORAL 2 TIMES DAILY
Status: DISCONTINUED | OUTPATIENT
Start: 2024-07-19 | End: 2024-07-20 | Stop reason: HOSPADM

## 2024-07-19 RX ORDER — SPIRONOLACTONE 25 MG/1
12.5 TABLET ORAL DAILY
COMMUNITY
End: 2024-07-20 | Stop reason: HOSPADM

## 2024-07-19 RX ORDER — ONDANSETRON 4 MG/1
4 TABLET, FILM COATED ORAL EVERY 8 HOURS PRN
Status: DISCONTINUED | OUTPATIENT
Start: 2024-07-19 | End: 2024-07-20 | Stop reason: HOSPADM

## 2024-07-19 SDOH — SOCIAL STABILITY: SOCIAL INSECURITY: WERE YOU ABLE TO COMPLETE ALL THE BEHAVIORAL HEALTH SCREENINGS?: YES

## 2024-07-19 SDOH — SOCIAL STABILITY: SOCIAL INSECURITY: ARE YOU OR HAVE YOU BEEN THREATENED OR ABUSED PHYSICALLY, EMOTIONALLY, OR SEXUALLY BY ANYONE?: NO

## 2024-07-19 SDOH — SOCIAL STABILITY: SOCIAL INSECURITY: ARE THERE ANY APPARENT SIGNS OF INJURIES/BEHAVIORS THAT COULD BE RELATED TO ABUSE/NEGLECT?: NO

## 2024-07-19 SDOH — SOCIAL STABILITY: SOCIAL INSECURITY: DOES ANYONE TRY TO KEEP YOU FROM HAVING/CONTACTING OTHER FRIENDS OR DOING THINGS OUTSIDE YOUR HOME?: NO

## 2024-07-19 SDOH — SOCIAL STABILITY: SOCIAL INSECURITY: HAVE YOU HAD ANY THOUGHTS OF HARMING ANYONE ELSE?: NO

## 2024-07-19 SDOH — SOCIAL STABILITY: SOCIAL INSECURITY: DO YOU FEEL ANYONE HAS EXPLOITED OR TAKEN ADVANTAGE OF YOU FINANCIALLY OR OF YOUR PERSONAL PROPERTY?: NO

## 2024-07-19 SDOH — SOCIAL STABILITY: SOCIAL INSECURITY: HAS ANYONE EVER THREATENED TO HURT YOUR FAMILY OR YOUR PETS?: NO

## 2024-07-19 SDOH — SOCIAL STABILITY: SOCIAL INSECURITY: ABUSE: ADULT

## 2024-07-19 SDOH — SOCIAL STABILITY: SOCIAL INSECURITY: HAVE YOU HAD THOUGHTS OF HARMING ANYONE ELSE?: NO

## 2024-07-19 SDOH — SOCIAL STABILITY: SOCIAL INSECURITY: DO YOU FEEL UNSAFE GOING BACK TO THE PLACE WHERE YOU ARE LIVING?: NO

## 2024-07-19 ASSESSMENT — LIFESTYLE VARIABLES
HOW OFTEN DO YOU HAVE 6 OR MORE DRINKS ON ONE OCCASION: NEVER
HAVE YOU EVER FELT YOU SHOULD CUT DOWN ON YOUR DRINKING: NO
PRESCIPTION_ABUSE_PAST_12_MONTHS: NO
HOW OFTEN DO YOU HAVE A DRINK CONTAINING ALCOHOL: NEVER
TOTAL SCORE: 0
EVER FELT BAD OR GUILTY ABOUT YOUR DRINKING: NO
EVER HAD A DRINK FIRST THING IN THE MORNING TO STEADY YOUR NERVES TO GET RID OF A HANGOVER: NO
AUDIT-C TOTAL SCORE: 0
AUDIT-C TOTAL SCORE: 0
SKIP TO QUESTIONS 9-10: 1
HAVE PEOPLE ANNOYED YOU BY CRITICIZING YOUR DRINKING: NO
SUBSTANCE_ABUSE_PAST_12_MONTHS: NO
HOW MANY STANDARD DRINKS CONTAINING ALCOHOL DO YOU HAVE ON A TYPICAL DAY: PATIENT DOES NOT DRINK

## 2024-07-19 ASSESSMENT — ENCOUNTER SYMPTOMS
VOMITING: 1
CONSTITUTIONAL NEGATIVE: 1
EYES NEGATIVE: 1
CARDIOVASCULAR NEGATIVE: 1
HEADACHES: 1
ABDOMINAL PAIN: 1
NAUSEA: 1
MUSCULOSKELETAL NEGATIVE: 1

## 2024-07-19 ASSESSMENT — COGNITIVE AND FUNCTIONAL STATUS - GENERAL
PATIENT BASELINE BEDBOUND: NO
MOBILITY SCORE: 24
DAILY ACTIVITIY SCORE: 24

## 2024-07-19 ASSESSMENT — ACTIVITIES OF DAILY LIVING (ADL)
ASSISTIVE_DEVICE: EYEGLASSES
HEARING - RIGHT EAR: FUNCTIONAL
DRESSING YOURSELF: INDEPENDENT
TOILETING: INDEPENDENT
JUDGMENT_ADEQUATE_SAFELY_COMPLETE_DAILY_ACTIVITIES: YES
FEEDING YOURSELF: INDEPENDENT
PATIENT'S MEMORY ADEQUATE TO SAFELY COMPLETE DAILY ACTIVITIES?: YES
HEARING - LEFT EAR: FUNCTIONAL
BATHING: INDEPENDENT
GROOMING: INDEPENDENT
LACK_OF_TRANSPORTATION: NO
WALKS IN HOME: INDEPENDENT
ADEQUATE_TO_COMPLETE_ADL: YES

## 2024-07-19 ASSESSMENT — PAIN - FUNCTIONAL ASSESSMENT
PAIN_FUNCTIONAL_ASSESSMENT: 0-10
PAIN_FUNCTIONAL_ASSESSMENT: 0-10

## 2024-07-19 ASSESSMENT — PAIN SCALES - GENERAL
PAINLEVEL_OUTOF10: 4
PAINLEVEL_OUTOF10: 10 - WORST POSSIBLE PAIN

## 2024-07-19 ASSESSMENT — COLUMBIA-SUICIDE SEVERITY RATING SCALE - C-SSRS
1. IN THE PAST MONTH, HAVE YOU WISHED YOU WERE DEAD OR WISHED YOU COULD GO TO SLEEP AND NOT WAKE UP?: NO
6. HAVE YOU EVER DONE ANYTHING, STARTED TO DO ANYTHING, OR PREPARED TO DO ANYTHING TO END YOUR LIFE?: NO
2. HAVE YOU ACTUALLY HAD ANY THOUGHTS OF KILLING YOURSELF?: NO

## 2024-07-19 NOTE — ED PROVIDER NOTES
Limitations to History: none  External Records Reviewed: Recent ED discharge reviewed. She presented with similar symptoms and was discharged home with zofran  Independent Historians: patient    HPI  Juju Adams is a 63 y.o. female with a history of GERD, HTN, dyslipidemia, and CAD presents to the ED with abdominal pain and nausea. She was recently discharged from the ED on 7/16 with similar symptoms. Since then she has not had any vomiting so that has improved, but the abdominal pain has continued to worsen. She describes the pain as being sharp diffusely through her abdomen and to her L flank. She rates her pain an 8/10. States that it comes and goes and is better when she is laying down compared to sitting up. Nothing else has made the pain better or worse. She states that she has some burning chest pain, lightheadedness, nausea, fever and chills, and constipation of over a week. Denies any vomiting, diarrhea, dark tarry stools, hematuria, or dysuria.     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   • Other specified cough 02/18/2019    Post-viral cough syndrome   • Personal history of other diseases of the respiratory system 12/26/2017    History of acute bronchitis with bronchospasm   • Personal history of other diseases of the respiratory system 07/31/2018    History of acute sinusitis   • Personal history of other drug therapy 11/01/2016    History of influenza vaccination   • Personal history of other infectious and parasitic diseases 04/13/2015    History of herpes zoster       Meds  Current Outpatient Medications   Medication Instructions   • albuterol 90 mcg/actuation inhaler 1-2 puffs, inhalation, Every 6 hours PRN   • ascorbic acid (VITAMIN C) 500 mg, oral, Daily   • aspirin 81 mg, oral, Daily   • atorvastatin (LIPITOR) 40 mg, oral, Nightly   • carvedilol (Coreg) 25 mg tablet Take 1 tablet (25 mg) by mouth 2 times a day  "with meals.   • chlorthalidone (HYGROTON) 25 mg, oral, Daily   • comp.stocking,thigh,long,large (EMS Anti-Embolism Stocking) misc 3 each, miscellaneous, Daily   • fexofenadine (ALLEGRA ALLERGY) 180 mg, oral, Daily PRN   • GaviLyte-G 236-22.74-6.74 -5.86 gram solution PLEASE SEE ATTACHED FOR DETAILED DIRECTIONS   • loratadine (CLARITIN) 10 mg, oral, Daily   • lubricating eye drops (polyvinyl alcohol-povidon,PF,) ophthalmic solution 1 drop, Both Eyes, As needed   • nitroglycerin (NITROSTAT) 0.4 mg, sublingual, Every 5 min PRN   • omeprazole (PRILOSEC) 40 mg, oral, 2 times daily before meals, Do not crush or chew.   • ondansetron (ZOFRAN) 4 mg, oral, Every 6 hours   • phenazopyridine (PYRIDIUM) 200 mg, oral, 3 times daily PRN   • spironolactone (ALDACTONE) 12.5 mg, oral, Daily   • SUMAtriptan (IMITREX) 25 mg, oral, Daily PRN   • tamsulosin (FLOMAX) 0.4 mg, oral, Daily   • tiZANidine (ZANAFLEX) 4 mg, oral, 3 times daily   • topiramate (TOPAMAX) 100 mg, oral, Daily       Allergies  Allergies   Allergen Reactions   • Latex Unknown   • Sulfa (Sulfonamide Antibiotics) Hives and Unknown   • Triethanolamine Oleate Unknown        SHx  Social History     Tobacco Use   • Smoking status: Former     Current packs/day: 0.50     Average packs/day: 0.5 packs/day for 40.0 years (20.0 ttl pk-yrs)     Types: Cigarettes     Passive exposure: Past   • Smokeless tobacco: Never   Vaping Use   • Vaping status: Never Used   Substance Use Topics   • Alcohol use: Not Currently   • Drug use: Never       ------------------------------------------------------------------------------------------------------------------------------------------    /81 (BP Location: Right arm, Patient Position: Sitting)   Pulse 76   Temp 35.5 °C (95.9 °F) (Temporal)   Resp 16   Ht 1.651 m (5' 5\")   Wt 77.1 kg (170 lb)   SpO2 99%   BMI 28.29 kg/m²     Physical Exam  Vitals reviewed.   Constitutional:       General: She is not in acute distress.     " Appearance: She is well-developed. She is ill-appearing. She is not toxic-appearing.      Comments: Uncomfortable and in pain   HENT:      Head: Normocephalic and atraumatic.      Nose: Nose normal.      Mouth/Throat:      Mouth: Mucous membranes are dry.   Cardiovascular:      Rate and Rhythm: Normal rate and regular rhythm.      Heart sounds: Normal heart sounds.   Pulmonary:      Effort: Pulmonary effort is normal.      Breath sounds: Normal breath sounds.   Abdominal:      General: Bowel sounds are decreased. There is distension.      Palpations: Abdomen is soft.      Tenderness: There is abdominal tenderness in the left upper quadrant. There is left CVA tenderness. There is no guarding. Negative signs include McBurney's sign.   Skin:     General: Skin is warm and dry.   Neurological:      General: No focal deficit present.      Mental Status: She is alert.   Psychiatric:         Mood and Affect: Mood normal.         Behavior: Behavior normal.        ------------------------------------------------------------------------------------------------------------------------------------------    Medical Decision Making: Pt is a 62 yo F with a PMH of GERD, HTN, renal mass, CAD, and dyslipidemia presenting with intractable nausea and abdominal pain. She is hemodynamically stable and vital signs are within normal limits. She was recently discharged from the ED on 7/16 with similar symptoms. She was offered admission for observation at the time, but had a vacation coming up so she declined. She was sent home with zofran. CBC is unremarkable. Lactate 0.9, UA showed 40 ketones, 75 leukocyte esterase, WBC 6-10, and bacteria 3+. Sodium was 126 and chloride 89 which was drawn before she was given a L of LR. Dilaudid was given for pain management which did improve her pain. Protonix and zofran were given for nausea which did relieve it for a few hours. Upon reassessment she mentioned that she felt like the nausea was coming  back and dose of reglan was given. CT abdomen pelvis did not have any acute findings only the chronic renal cyst and a hepatic cyst that she will follow up for with her PCP. On reassessment her nausea was gone and she stated her abdominal pain had come back. Will PO challenge to see how that is tolerated. I will be signing this patient out to another provider.        Independent Interpretations: Breast US did not show any cyst or mass.     EKG interpreted by me: Rate 60 and sinus rhythm, normal axis, no ST elevations, intervals are within normal limits, T  wave changes were present on previous EKG no significant changes  Diagnoses as of 07/22/24 1546   Hyponatremia   Abdominal pain, unspecified abdominal location   Nausea and vomiting, unspecified vomiting type       Discussed with: attending emergency medicine physician       Stefani LoPresti, OMS IV       Stefani Lopresti  07/19/24 1707       Stefani Lopresti  07/22/24 1546

## 2024-07-19 NOTE — PROGRESS NOTES
Pharmacy Medication History Review    Juju Adams is a 63 y.o. female admitted for Abdominal pain. Pharmacy reviewed the patient's [id number]-ix-[id number] medications and allergies for accuracy.    The list below reflects the updated PTA list. Comments regarding how patient may be taking medications differently can be found in the Admit Orders Activity  Prior to Admission Medications   Prescriptions Last Dose Informant Patient Reported? Taking?   POTASSIUM GLUCONATE ORAL 7/18/2024 Self Yes Yes   Sig: Take 1 tablet by mouth once daily.   albuterol 90 mcg/actuation inhaler More than a month Self Yes No   Sig: Inhale 1-2 puffs every 6 hours if needed for wheezing or shortness of breath.   ascorbic acid (Vitamin C) 500 mg tablet 7/18/2024 Self Yes Yes   Sig: Take 1 tablet (500 mg) by mouth once daily.   aspirin 81 mg EC tablet 7/18/2024 Self Yes Yes   Sig: Take 1 tablet (81 mg) by mouth once daily.   atorvastatin (Lipitor) 40 mg tablet 7/18/2024 Self No Yes   Sig: Take 1 tablet (40 mg) by mouth once daily at bedtime.   carvedilol (Coreg) 25 mg tablet 7/18/2024 Self No Yes   Sig: Take 1 tablet (25 mg) by mouth 2 times a day with meals.   chlorthalidone (Hygroton) 25 mg tablet 7/18/2024 Self No Yes   Sig: Take 1 tablet (25 mg) by mouth once daily.   fexofenadine-pseudoephedrine (Allegra-D 24) 180-240 mg 24 hr tablet 7/18/2024 Self Yes Yes   Sig: Take 1 tablet by mouth once daily as needed for allergies. Do not crush, chew, or split.   lubricating eye drops (polyvinyl alcohol-povidon,PF,) ophthalmic solution  Self Yes No   Sig: Administer 1 drop into both eyes if needed for dry eyes.  Not using    omeprazole (PriLOSEC) 40 mg DR capsule Past Week Self No Yes   Sig: Take 1 capsule (40 mg) by mouth 2 times a day before meals. Do not crush or chew.   Takes 1 capsule (40 mg) once daily    ondansetron (Zofran) 4 mg tablet More than a month Self Yes No   Sig: Take 1 tablet (4 mg) by mouth every 6 hours if needed for nausea or  vomiting.   spironolactone (Aldactone) 25 mg tablet 7/18/2024 Self Yes Yes   Sig: Take 0.5 tablets (12.5 mg) by mouth once daily.      Facility-Administered Medications: None        The list below reflects the updated allergy list. Please review each documented allergy for additional clarification and justification.  Allergies  Reviewed by Марина Tate [facility] on 7/19/2024        Severity Reactions Comments    Sulfa (sulfonamide Antibiotics) Not Specified Hives, Unknown     Latex Low Rash     Triethanolamine Oleate Low Rash             Patient accepts M2B at discharge. Pharmacy has been updated to [facility].    Sources used to confirm home medication list include: Patient interview, OARRS, Care Everywhere, medication fill history, Office Visit Note 4/3/24.    Medications added: Albuterol Inhaler, Vitamin C, Fexofenadine-Pseudoephedrine, Ondansetron, Spironolactone, Potassium Gluconate    Medications modified: Omeprazole    Medications to be removed: Lubricating eye drops    Below are additional concerns with the patient's PTA list.  None to note    Марина Tate [facility]   Transitions of Care Pharmacist  [facility]  Please reach out via Secure Chat for questions, or if no response call VeriShow or Desura

## 2024-07-19 NOTE — H&P
HPI     Ms Adams is a 63y female with PMHx: Migraines, GERD, Constipation, HTN, HLD who presented to the ED from home for c/o abdominal tightness/pain, nausea/vomiting.  States started on Tuesday while she was at work and she came down to ED and was seen and discharged home.  She states her and her boyfriend left yesterday to go to [place] and she started feeling very nauseated again so they turned around and came to ED.  Patient is also stating she is starting to get a headache right now during the exam and was asking for something for the headache.      While in the ED vitals were stable and Na was 126 and otherwise labs were WNL and stable she also had a CT scan which was negative for any acute issues.  Patient to be admitted observation for abdominal pain w/nausea/vomiting    ROS/EXAM     Review of Systems   Constitutional: Negative.    HENT: Negative.     Eyes: Negative.    Cardiovascular: Negative.    Gastrointestinal:  Positive for abdominal pain, nausea and vomiting.   Genitourinary: Negative.    Musculoskeletal: Negative.    Skin: Negative.    Neurological:  Positive for headaches.   All other systems reviewed and are negative.    Physical Exam  Vitals reviewed.   Constitutional:       Appearance: Normal appearance.   HENT:      Head: Normocephalic.      Mouth/Throat:      Mouth: Mucous membranes are dry.   Eyes:      Extraocular Movements: Extraocular movements intact.      Conjunctiva/sclera: Conjunctivae normal.      Pupils: Pupils are equal, round, and reactive to light.   Cardiovascular:      Rate and Rhythm: Normal rate and regular rhythm.      Pulses: Normal pulses.      Heart sounds: Normal heart sounds, S1 normal and S2 normal.   Pulmonary:      Effort: Pulmonary effort is normal.      Breath sounds: Normal breath sounds and air entry.   Abdominal:      General: Abdomen is flat. Bowel sounds are normal.      Palpations: Abdomen is soft.   Musculoskeletal:         General: Normal range of  "motion.      Cervical back: Full passive range of motion without pain and normal range of motion.   Skin:     General: Skin is warm and dry.   Neurological:      General: No focal deficit present.      Mental Status: She is alert and oriented to person, place, and time. Mental status is at baseline.   Psychiatric:         Attention and Perception: Attention normal.         Mood and Affect: Mood normal.         Speech: Speech normal.         Vitals/LABS/RESULTS     Last Recorded Vitals  Blood pressure 150/81, pulse 76, temperature 35.5 °C (95.9 °F), temperature source Temporal, resp. rate 16, height 1.651 m (5' 5\"), weight 77.1 kg (170 lb), SpO2 99%.  Intake/Output last 3 Shifts:  No intake/output data recorded.    Relevant Results  Lab Results   Component Value Date    WBC 4.8 07/19/2024    HGB 15.0 07/19/2024    HCT 41.4 07/19/2024    MCV 86 07/19/2024     07/19/2024      Lab Results   Component Value Date    GLUCOSE 89 07/19/2024    CALCIUM 9.9 07/19/2024     (L) 07/19/2024    K 3.9 07/19/2024    CO2 26 07/19/2024    CL 89 (L) 07/19/2024    BUN 8 07/19/2024    CREATININE 0.71 07/19/2024     CT abdomen pelvis w IV contrast    Result Date: 7/19/2024  Interpreted By:  Maria Luz Newby and Stevens Alex STUDY: CT ABDOMEN PELVIS W IV CONTRAST;  7/19/2024 2:40 pm   INDICATION: Signs/Symptoms:Left upper/ lower abdominal pain, vomiting, left flank pain. Per EMR, 63 y.o. female with a history of GERD, HTN, dyslipidemia, and CAD presents to the ED with abdominal pain and nausea. She was recently discharged from the ED on 7/16 with similar symptoms. Since then she has not had any vomiting so that has improved, but the abdominal pain has continued to worsen. She describes the pain as being sharp diffusely through her abdomen and to her L flank   COMPARISON: CT chest abdomen and pelvis IV contrast 11/03/2023.   ACCESSION NUMBER(S): [id number]   ORDERING CLINICIAN: JAMILAH CORDOVA   TECHNIQUE: CT of the abdomen and " pelvis was performed.  Standard contiguous axial images were obtained at 3 mm slice thickness through the abdomen and pelvis. Coronal and sagittal reconstructions at 3 mm slice thickness were performed.   80 ml of contrast Omnipaque 350 were administered intravenously without immediate complication.   FINDINGS: LOWER CHEST: There is bibasilar linear atelectasis/scarring.. The heart is normal in size without pericardial effusion. No pleural effusion is present. Visualized distal esophagus appears normal.   ABDOMEN:   LIVER: The liver is normal in size. Re-demonstration of multiple hypodense lesions scattered throughout the liver, the largest measuring 0.9 cm in segment 4A. Regions are likely representative of benign cysts.   BILE DUCTS: The intrahepatic and extrahepatic ducts are not dilated.   GALLBLADDER: No calcified stones. No wall thickening.   PANCREAS: Within normal limits.   SPLEEN: Within normal limits.   ADRENAL GLANDS: Unchanged 1.3 cm nodule in the left adrenal gland. The right adrenal gland is unremarkable.   KIDNEYS AND URETERS: The kidneys are normal in size and enhance symmetrically. Multiple hypodense lesions within both kidneys, the largest measuring 2.7 cm seen on series 201, image 52, which likely represent benign cysts. No hydroureteronephrosis or nephroureterolithiasis is identified.   PELVIS: Assessment is hindered by artifacts secondary to bilateral hip arthroplasty hardware.   BLADDER: Not evaluated given artifacts.   REPRODUCTIVE ORGANS: Uterus is partially visualized given artifacts.   BOWEL: There is a small hiatal hernia. Otherwise the stomach, small and large bowel are normal in appearance without wall thickening or obstruction. The appendix appears normal.   VESSELS: There is no aneurysmal dilatation of the abdominal aorta. The IVC appears normal.   PERITONEUM/RETROPERITONEUM/LYMPH NODES: There is no free or loculated fluid collection, no free intraperitoneal air. The retroperitoneum  appears normal.  No abdominopelvic lymphadenopathy is present.   BONES AND ABDOMINAL WALL: No suspicious osseous lesions are identified.  Re-demonstration of bilateral total hip arthroplasties. Abdominal wall soft tissues are normal in appearance.       1. No CT evidence of acute abdominopelvic abnormality. 2. Other chronic findings as described above   I personally reviewed the images/study and I agree with the findings as stated. This study was interpreted at [facility], [place], [place].   MACRO: None   Signed by: Maria Luz Newby 7/19/2024 4:23 PM Dictation workstation:   [id number]    Medications     Scheduled medications  acetaminophen, 975 mg, oral, q8h  aspirin, 81 mg, oral, Daily  atorvastatin, 40 mg, oral, Nightly  carvedilol, 25 mg, oral, BID  chlorthalidone, 25 mg, oral, Daily  diphenhydrAMINE, 25 mg, intravenous, Once  enoxaparin, 40 mg, subcutaneous, q24h  ketorolac, 15 mg, intravenous, Once  metoclopramide, 10 mg, intravenous, Once  metoclopramide, 10 mg, oral, q6h GISELA  [START ON 7/20/2024] pantoprazole, 40 mg, oral, Daily before breakfast  sennosides-docusate sodium, 2 tablet, oral, BID      Continuous medications  sodium chloride 0.9%, 100 mL/hr      PRN medications  PRN medications: ketorolac, lubricating eye drops, ondansetron **OR** ondansetron, trimethobenzamide    PLAN     Ms Adams is a 63y female with PMHx: Migraines, GERD, Constipation, HTN, HLD who presented to the ED from home for c/o abdominal tightness/pain, nausea/vomiting.  States started on Tuesday while she was at work and she came down to ED and was seen and discharged home.  She states her and her boyfriend left yesterday to go to [place] and she started feeling very nauseated again so they turned around and came to ED.  Patient is also stating she is starting to get a headache right now during the exam and was asking for something for the headache.      While in the ED vitals were stable and Na  was 126 and otherwise labs were WNL and stable she also had a CT scan which was negative for any acute issues.  Patient to be admitted observation for abdominal pain w/nausea/vomiting  Assessment/Plan:    Abdominal pain w/nausea/vomiting  Hyponatremia ?2/2 dehydration?  -CT abdomen negative  -Na: 126  -Urine lytes w/ua ordered  -NS@100cc/hr  -NPO advance as tolerated  -Zofran/Tigan prn for nausea  -Reglan 10mg PO qid  -daily RFP- recheck Na in am    Migraines  -Migraine Cocktail:  Toradol IV/Reglan IV/Benadryl IV x1  -prn tylenol and Toradol    HTN  HLD  -c/w home aspirin 81mg daily  -c/w home atorvastatin 40mg daily  -c/w home carvedilol 25mg bid  -c/w home chlorthalidone 25mg daily    GERD  -c/w home omeprazole as protonix 40mg daily    Fluids: NS @100cc/hr  Electrolytes: monitor and replete as needed  Nutrition: NPO advance as tolerated  GI prophylaxis: Protonix 40mg QD  DVT prophylaxis: SCD  Code Status: Full Code  PCP  Pharmacy    Disposition:  Admitted for above diagnoses. Plan per above. Anticipate observation      Shante Hutchison, APRN-CNP         I spent 75 minutes in the professional and overall care on this encounter before, during and after the visit, examining the patient, reviewing labs, writing orders and documenting the note

## 2024-07-19 NOTE — PROGRESS NOTES
I assumed care of this patient at 5 PM.    Please see initial provider note for full HPI.  Briefly this is a 63-year-old female presenting due to G abdominal pain, nausea and vomiting with able to keep only minimal p.o. down.  Patient CT of her abdomen pelvis does not show any acute abnormalities however sodium has dropped from 128 noted 3 days prior to 126.  In the setting of poor p.o. intake I suspect this is likely hypovolemic hyponatremia however bicarb is normal.  Patient was admitted to medicine further workup of hyponatremia and remained hemodynamically stable throughout my shift.  Patient care was overseen by attending physician who agrees with the plan and disposition.    Rachel Barbour MD  Emergency Medicine - PGY3  [facility] [place]  [address] [address] [facility] [address]

## 2024-07-20 VITALS
DIASTOLIC BLOOD PRESSURE: 67 MMHG | OXYGEN SATURATION: 98 % | BODY MASS INDEX: 28.32 KG/M2 | HEART RATE: 75 BPM | HEIGHT: 65 IN | WEIGHT: 170 LBS | TEMPERATURE: 97.7 F | RESPIRATION RATE: 16 BRPM | SYSTOLIC BLOOD PRESSURE: 106 MMHG

## 2024-07-20 LAB
ALBUMIN SERPL BCP-MCNC: 3.4 G/DL (ref 3.4–5)
ANION GAP SERPL CALC-SCNC: 12 MMOL/L (ref 10–20)
ATRIAL RATE: 60 BPM
BACTERIA UR CULT: NORMAL
BUN SERPL-MCNC: 10 MG/DL (ref 6–23)
CALCIUM SERPL-MCNC: 8.5 MG/DL (ref 8.6–10.6)
CHLORIDE SERPL-SCNC: 97 MMOL/L (ref 98–107)
CO2 SERPL-SCNC: 26 MMOL/L (ref 21–32)
CREAT SERPL-MCNC: 0.77 MG/DL (ref 0.5–1.05)
EGFRCR SERPLBLD CKD-EPI 2021: 86 ML/MIN/1.73M*2
ERYTHROCYTE [DISTWIDTH] IN BLOOD BY AUTOMATED COUNT: 12.6 % (ref 11.5–14.5)
GLUCOSE SERPL-MCNC: 68 MG/DL (ref 74–99)
HCT VFR BLD AUTO: 36.7 % (ref 36–46)
HGB BLD-MCNC: 12.3 G/DL (ref 12–16)
HOLD SPECIMEN: NORMAL
MCH RBC QN AUTO: 30.8 PG (ref 26–34)
MCHC RBC AUTO-ENTMCNC: 33.5 G/DL (ref 32–36)
MCV RBC AUTO: 92 FL (ref 80–100)
NRBC BLD-RTO: 0 /100 WBCS (ref 0–0)
P AXIS: 72 DEGREES
P OFFSET: 204 MS
P ONSET: 143 MS
PHOSPHATE SERPL-MCNC: 3.7 MG/DL (ref 2.5–4.9)
PLATELET # BLD AUTO: 206 X10*3/UL (ref 150–450)
POTASSIUM SERPL-SCNC: 3.6 MMOL/L (ref 3.5–5.3)
PR INTERVAL: 154 MS
Q ONSET: 220 MS
QRS COUNT: 10 BEATS
QRS DURATION: 88 MS
QT INTERVAL: 434 MS
QTC CALCULATION(BAZETT): 434 MS
QTC FREDERICIA: 434 MS
R AXIS: 56 DEGREES
RBC # BLD AUTO: 4 X10*6/UL (ref 4–5.2)
SODIUM SERPL-SCNC: 131 MMOL/L (ref 136–145)
T AXIS: 64 DEGREES
T OFFSET: 437 MS
VENTRICULAR RATE: 60 BPM
WBC # BLD AUTO: 3.9 X10*3/UL (ref 4.4–11.3)

## 2024-07-20 PROCEDURE — 84100 ASSAY OF PHOSPHORUS: CPT | Performed by: NURSE PRACTITIONER

## 2024-07-20 PROCEDURE — 2500000004 HC RX 250 GENERAL PHARMACY W/ HCPCS (ALT 636 FOR OP/ED): Performed by: INTERNAL MEDICINE

## 2024-07-20 PROCEDURE — 96376 TX/PRO/DX INJ SAME DRUG ADON: CPT

## 2024-07-20 PROCEDURE — G0378 HOSPITAL OBSERVATION PER HR: HCPCS

## 2024-07-20 PROCEDURE — 36415 COLL VENOUS BLD VENIPUNCTURE: CPT | Performed by: NURSE PRACTITIONER

## 2024-07-20 PROCEDURE — 85027 COMPLETE CBC AUTOMATED: CPT | Performed by: NURSE PRACTITIONER

## 2024-07-20 PROCEDURE — 99239 HOSP IP/OBS DSCHRG MGMT >30: CPT | Performed by: INTERNAL MEDICINE

## 2024-07-20 PROCEDURE — 2500000001 HC RX 250 WO HCPCS SELF ADMINISTERED DRUGS (ALT 637 FOR MEDICARE OP): Performed by: NURSE PRACTITIONER

## 2024-07-20 RX ORDER — OMEPRAZOLE 40 MG/1
40 CAPSULE, DELAYED RELEASE ORAL DAILY
Qty: 30 CAPSULE | Refills: 0 | Status: SHIPPED | OUTPATIENT
Start: 2024-07-20 | End: 2024-08-19

## 2024-07-20 RX ORDER — ACETAMINOPHEN 325 MG/1
975 TABLET ORAL EVERY 8 HOURS PRN
Status: DISCONTINUED | OUTPATIENT
Start: 2024-07-20 | End: 2024-07-20 | Stop reason: HOSPADM

## 2024-07-20 RX ORDER — KETOROLAC TROMETHAMINE 15 MG/ML
15 INJECTION, SOLUTION INTRAMUSCULAR; INTRAVENOUS EVERY 6 HOURS PRN
Status: DISCONTINUED | OUTPATIENT
Start: 2024-07-20 | End: 2024-07-20 | Stop reason: HOSPADM

## 2024-07-20 ASSESSMENT — COGNITIVE AND FUNCTIONAL STATUS - GENERAL
MOBILITY SCORE: 24
DAILY ACTIVITIY SCORE: 24

## 2024-07-20 ASSESSMENT — PAIN DESCRIPTION - LOCATION: LOCATION: BACK

## 2024-07-20 ASSESSMENT — PAIN DESCRIPTION - ORIENTATION: ORIENTATION: LEFT

## 2024-07-20 ASSESSMENT — PAIN SCALES - GENERAL
PAINLEVEL_OUTOF10: 7
PAINLEVEL_OUTOF10: 4

## 2024-07-20 ASSESSMENT — ACTIVITIES OF DAILY LIVING (ADL): LACK_OF_TRANSPORTATION: NO

## 2024-07-20 ASSESSMENT — PAIN - FUNCTIONAL ASSESSMENT: PAIN_FUNCTIONAL_ASSESSMENT: 0-10

## 2024-07-20 NOTE — CARE PLAN
Problem: Pain - Adult  Goal: Verbalizes/displays adequate comfort level or baseline comfort level  Outcome: Progressing     Problem: Safety - Adult  Goal: Free from fall injury  Outcome: Progressing     Problem: Discharge Planning  Goal: Discharge to home or other facility with appropriate resources  Outcome: Progressing     Problem: Chronic Conditions and Co-morbidities  Goal: Patient's chronic conditions and co-morbidity symptoms are monitored and maintained or improved  Outcome: Progressing     Problem: Pain  Goal: Takes deep breaths with improved pain control throughout the shift  Outcome: Progressing  Goal: Walks with improved pain control throughout the shift  Outcome: Progressing  Goal: Performs ADL's with improved pain control throughout shift  Outcome: Progressing  Goal: Participates in PT with improved pain control throughout the shift  Outcome: Progressing   The patient's goals for the shift include Patient will remain safe and free from injury.    The clinical goals for the shift include pt will tolerate reg diet during shift

## 2024-07-20 NOTE — CARE PLAN
The patient's goals for the shift include Patient will remain safe and free from injury.    The clinical goals for the shift include Patient nausea will subside    Problem: Pain - Adult  Goal: Verbalizes/displays adequate comfort level or baseline comfort level  Outcome: Progressing     Problem: Safety - Adult  Goal: Free from fall injury  Outcome: Progressing     Problem: Discharge Planning  Goal: Discharge to home or other facility with appropriate resources  Outcome: Progressing     Problem: Chronic Conditions and Co-morbidities  Goal: Patient's chronic conditions and co-morbidity symptoms are monitored and maintained or improved  Outcome: Progressing     Problem: Pain  Goal: Takes deep breaths with improved pain control throughout the shift  Outcome: Progressing  Goal: Turns in bed with improved pain control throughout the shift  Outcome: Progressing  Goal: Walks with improved pain control throughout the shift  Outcome: Progressing  Goal: Performs ADL's with improved pain control throughout shift  Outcome: Progressing  Goal: Participates in PT with improved pain control throughout the shift  Outcome: Progressing  Goal: Free from acute confusion related to pain meds throughout the shift  Outcome: Progressing

## 2024-07-20 NOTE — PROGRESS NOTES
Met with pt and introduced myself as care coordinator with Care Transitions Team for discharge planning. Pt reports being independent with ADL's. Pt reported no safety concerns at home, she stated no falls in last year. Pt's address, phone number, and contact information was verified.    Home care: None  DME: None  : none.  PCP: Chelita Lazo Last appt: 3 months ago  Transport to appts:   Pharm: [facility] est 222nd (denies issues affording/obtaining medications)     Discharge Planning: Pt stated she would like to return to home at time of discharge.  Attending updated. Pt aware I will follow up with her to discuss discharge planning once therapy evals are in. Care coordinator will continue to follow for discharge planning needs.

## 2024-07-20 NOTE — DISCHARGE SUMMARY
Discharge Diagnosis  Abdominal pain  Viral gastroenteritis   Hypovolemic Hyponatremia   Dehydration     Discharge Meds     Your medication list        CONTINUE taking these medications        Instructions Last Dose Given Next Dose Due   albuterol 90 mcg/actuation inhaler           ascorbic acid 500 mg tablet  Commonly known as: Vitamin C           aspirin 81 mg EC tablet           atorvastatin 40 mg tablet  Commonly known as: Lipitor      Take 1 tablet (40 mg) by mouth once daily at bedtime.       carvedilol 25 mg tablet  Commonly known as: Coreg      Take 1 tablet (25 mg) by mouth 2 times a day with meals.       chlorthalidone 25 mg tablet  Commonly known as: Hygroton      Take 1 tablet (25 mg) by mouth once daily.       fexofenadine-pseudoephedrine 180-240 mg 24 hr tablet  Commonly known as: Allegra-D 24           lubricating eye drops ophthalmic solution           omeprazole 40 mg DR capsule  Commonly known as: PriLOSEC      Take 1 capsule (40 mg) by mouth once daily. Do not crush or chew.              STOP taking these medications      ondansetron 4 mg tablet  Commonly known as: Zofran        POTASSIUM GLUCONATE ORAL        spironolactone 25 mg tablet  Commonly known as: Aldactone                  Where to Get Your Medications        These medications were sent to [facility]  [address], [address], [place] [address]      Hours: 8AM to 6PM Mon-Fri, 8AM to 4PM Sat, 9AM to 1PM Sun Phone: [phone]   omeprazole 40 mg DR capsule           Hospital Course  Ms Adams is a 63 year old female with PMHx of Migraines, GERD, Constipation, HTN, HLD who presented to the ED from home for c/o abdominal tightness/pain, nausea/vomiting.  She stated her symptoms started on Tuesday while she was at work and she came down to ED and was seen and discharged home.  She stated her and her boyfriend left yesterday to go to [place] and she started feeling very nauseated so they turned around and came to  ED.  She admitted to having a small bite of a week old yogurt from her refrigator that it did not taste good.      While in the ED vitals were stable and Na was 126 and otherwise labs were WNL and stable she also had a CT scan which was negative for any acute issues.  Troponin < 3 and EKG with no acute changes.  She received IV fluids and her sodium improved to 131.   She tolerated diet. She is stable for discharge     Pertinent Physical Exam At Time of Discharge  Physical Exam  Constitutional: lying in bed with no distress  Neck: No JVD  Cardiac: regular rate and rhythm, S1 and S2 present, no rubs, no murmurs, no gallops  Lungs: clear to auscultation bilaterally, no wheezing, no rhonchi   Abdomen: bowel sounds present,  non tender, non distended  Ext: No cyanosis, no clubbing, no edema   Outpatient Follow-Up  No future appointments.    >35 minutes spent coordinating the care of the patient     Sharifa Trejo DO

## 2024-07-30 ENCOUNTER — PATIENT OUTREACH (OUTPATIENT)
Dept: CARE COORDINATION | Facility: CLINIC | Age: 64
End: 2024-07-30
Payer: COMMERCIAL

## 2024-07-30 ENCOUNTER — HOSPITAL ENCOUNTER (EMERGENCY)
Facility: HOSPITAL | Age: 64
Discharge: HOME | End: 2024-07-30
Payer: COMMERCIAL

## 2024-07-30 VITALS
WEIGHT: 170 LBS | OXYGEN SATURATION: 97 % | HEIGHT: 63 IN | RESPIRATION RATE: 16 BRPM | HEART RATE: 76 BPM | BODY MASS INDEX: 30.12 KG/M2 | SYSTOLIC BLOOD PRESSURE: 151 MMHG | TEMPERATURE: 97.7 F | DIASTOLIC BLOOD PRESSURE: 84 MMHG

## 2024-07-30 PROCEDURE — 4500999001 HC ED NO CHARGE

## 2024-07-30 ASSESSMENT — PAIN DESCRIPTION - LOCATION: LOCATION: ABDOMEN

## 2024-07-30 ASSESSMENT — PAIN SCALES - GENERAL: PAINLEVEL_OUTOF10: 8

## 2024-07-30 ASSESSMENT — PAIN DESCRIPTION - DESCRIPTORS: DESCRIPTORS: BURNING

## 2024-07-30 ASSESSMENT — PAIN - FUNCTIONAL ASSESSMENT: PAIN_FUNCTIONAL_ASSESSMENT: 0-10

## 2024-07-30 ASSESSMENT — PAIN DESCRIPTION - PAIN TYPE: TYPE: ACUTE PAIN

## 2024-07-30 NOTE — PROGRESS NOTES
[facility] note:  Juju was just in the [facility] ED today.  Will defer outreach until tomorrow 7/31    Anjana Lopez RN [facility]  [phone]

## 2024-07-30 NOTE — ED TRIAGE NOTES
Pt states that she has GERD and pt states that the reflux is going into her throat and states that she needs a scope now verses later

## 2024-07-31 ENCOUNTER — PATIENT OUTREACH (OUTPATIENT)
Dept: CARE COORDINATION | Facility: CLINIC | Age: 64
End: 2024-07-31
Payer: COMMERCIAL

## 2024-07-31 NOTE — PROGRESS NOTES
[facility] ELVIRA follow up:  Chart reviewed, call placed and VMM left, requesting that Juju call back tomorrow morning or Friday    Anjana Lopez RN [facility]  [phone]

## 2024-08-01 ENCOUNTER — OFFICE VISIT (OUTPATIENT)
Dept: PRIMARY CARE | Facility: HOSPITAL | Age: 64
End: 2024-08-01
Payer: COMMERCIAL

## 2024-08-01 VITALS
HEIGHT: 63 IN | SYSTOLIC BLOOD PRESSURE: 128 MMHG | BODY MASS INDEX: 31.36 KG/M2 | OXYGEN SATURATION: 97 % | HEART RATE: 78 BPM | TEMPERATURE: 96.1 F | WEIGHT: 177 LBS | DIASTOLIC BLOOD PRESSURE: 73 MMHG

## 2024-08-01 DIAGNOSIS — R07.9 CHEST PAIN, UNSPECIFIED TYPE: ICD-10-CM

## 2024-08-01 DIAGNOSIS — Z00.00 HEALTHCARE MAINTENANCE: ICD-10-CM

## 2024-08-01 DIAGNOSIS — K21.9 GASTROESOPHAGEAL REFLUX DISEASE, UNSPECIFIED WHETHER ESOPHAGITIS PRESENT: Primary | ICD-10-CM

## 2024-08-01 LAB
ALBUMIN SERPL BCP-MCNC: 4.1 G/DL (ref 3.4–5)
ANION GAP SERPL CALC-SCNC: 13 MMOL/L (ref 10–20)
BASOPHILS # BLD AUTO: 0.06 X10*3/UL (ref 0–0.1)
BASOPHILS NFR BLD AUTO: 1 %
BUN SERPL-MCNC: 12 MG/DL (ref 6–23)
CALCIUM SERPL-MCNC: 10.2 MG/DL (ref 8.6–10.6)
CHLORIDE SERPL-SCNC: 98 MMOL/L (ref 98–107)
CHOLEST SERPL-MCNC: 182 MG/DL (ref 0–199)
CHOLESTEROL/HDL RATIO: 3.1
CO2 SERPL-SCNC: 31 MMOL/L (ref 21–32)
CREAT SERPL-MCNC: 0.81 MG/DL (ref 0.5–1.05)
EGFRCR SERPLBLD CKD-EPI 2021: 81 ML/MIN/1.73M*2
EOSINOPHIL # BLD AUTO: 0.09 X10*3/UL (ref 0–0.7)
EOSINOPHIL NFR BLD AUTO: 1.4 %
ERYTHROCYTE [DISTWIDTH] IN BLOOD BY AUTOMATED COUNT: 12.9 % (ref 11.5–14.5)
EST. AVERAGE GLUCOSE BLD GHB EST-MCNC: 111 MG/DL
GLUCOSE SERPL-MCNC: 95 MG/DL (ref 74–99)
HBA1C MFR BLD: 5.5 %
HCT VFR BLD AUTO: 42.2 % (ref 36–46)
HDLC SERPL-MCNC: 58.3 MG/DL
HGB BLD-MCNC: 14.1 G/DL (ref 12–16)
IMM GRANULOCYTES # BLD AUTO: 0.02 X10*3/UL (ref 0–0.7)
IMM GRANULOCYTES NFR BLD AUTO: 0.3 % (ref 0–0.9)
LDLC SERPL CALC-MCNC: 108 MG/DL
LYMPHOCYTES # BLD AUTO: 2.13 X10*3/UL (ref 1.2–4.8)
LYMPHOCYTES NFR BLD AUTO: 34 %
MCH RBC QN AUTO: 31.5 PG (ref 26–34)
MCHC RBC AUTO-ENTMCNC: 33.4 G/DL (ref 32–36)
MCV RBC AUTO: 94 FL (ref 80–100)
MONOCYTES # BLD AUTO: 0.54 X10*3/UL (ref 0.1–1)
MONOCYTES NFR BLD AUTO: 8.6 %
NEUTROPHILS # BLD AUTO: 3.42 X10*3/UL (ref 1.2–7.7)
NEUTROPHILS NFR BLD AUTO: 54.7 %
NON HDL CHOLESTEROL: 124 MG/DL (ref 0–149)
NRBC BLD-RTO: 0 /100 WBCS (ref 0–0)
PHOSPHATE SERPL-MCNC: 2.6 MG/DL (ref 2.5–4.9)
PLATELET # BLD AUTO: 278 X10*3/UL (ref 150–450)
POTASSIUM SERPL-SCNC: 3.6 MMOL/L (ref 3.5–5.3)
RBC # BLD AUTO: 4.47 X10*6/UL (ref 4–5.2)
SODIUM SERPL-SCNC: 138 MMOL/L (ref 136–145)
TRIGL SERPL-MCNC: 81 MG/DL (ref 0–149)
VLDL: 16 MG/DL (ref 0–40)
WBC # BLD AUTO: 6.3 X10*3/UL (ref 4.4–11.3)

## 2024-08-01 PROCEDURE — 83718 ASSAY OF LIPOPROTEIN: CPT

## 2024-08-01 PROCEDURE — 36415 COLL VENOUS BLD VENIPUNCTURE: CPT

## 2024-08-01 PROCEDURE — 85025 COMPLETE CBC W/AUTO DIFF WBC: CPT

## 2024-08-01 PROCEDURE — RXMED WILLOW AMBULATORY MEDICATION CHARGE

## 2024-08-01 PROCEDURE — 83036 HEMOGLOBIN GLYCOSYLATED A1C: CPT

## 2024-08-01 PROCEDURE — 80069 RENAL FUNCTION PANEL: CPT

## 2024-08-01 RX ORDER — SUCRALFATE 1 G/1
1 TABLET ORAL 4 TIMES DAILY
COMMUNITY
Start: 2024-07-30 | End: 2024-08-13

## 2024-08-01 RX ORDER — FAMOTIDINE 20 MG/1
20 TABLET, FILM COATED ORAL 2 TIMES DAILY
Qty: 60 TABLET | Refills: 5 | Status: SHIPPED | OUTPATIENT
Start: 2024-08-01 | End: 2025-01-28

## 2024-08-01 ASSESSMENT — PATIENT HEALTH QUESTIONNAIRE - PHQ9
1. LITTLE INTEREST OR PLEASURE IN DOING THINGS: NOT AT ALL
SUM OF ALL RESPONSES TO PHQ9 QUESTIONS 1 AND 2: 0
2. FEELING DOWN, DEPRESSED OR HOPELESS: NOT AT ALL

## 2024-08-01 ASSESSMENT — ENCOUNTER SYMPTOMS
OCCASIONAL FEELINGS OF UNSTEADINESS: 0
LOSS OF SENSATION IN FEET: 0
DEPRESSION: 0

## 2024-08-01 ASSESSMENT — PAIN SCALES - GENERAL: PAINLEVEL: 0-NO PAIN

## 2024-08-01 NOTE — PROGRESS NOTES
"Primary Care Internal Medicine Clinic Note     Subjective   Interval history:  Patient has been having poorly controlled GERD, was in the ED recently for N/V, resolved but was also noted to hyponatremic, stopped spironolactone. Patient was also started of Carafate with some improvement of her reflux, but otherwise her symptoms are still significant. She was seen by Dr. Cheng (cardiology) 3 months ago, her pain is most likely related to her GERD. Other than acid reflux, patient has no further complaints.         Objective   Visit Vitals  /73 (BP Location: Right arm, Patient Position: Sitting, BP Cuff Size: Adult)   Pulse 78   Temp 35.6 °C (96.1 °F) (Temporal)   Ht 1.6 m (5' 3\")   Wt 80.3 kg (177 lb)   SpO2 97%   BMI 31.35 kg/m²   OB Status Postmenopausal   Smoking Status Former   BSA 1.89 m²       Physical exam:  Gen: well-appearing, NAD  Head and neck: NCAT, neck supple without LAD  HEENT: MMM, normal nose without congestion  CV: RRR no mrg  Pulm: CTAB, no wheezing or crackles, normal WOB on RA  Abd: soft, non-tender, non-distended  Ext: WWP, no LE edema  Neuro: alert and oriented x3, normal tone, face symmetric, moves all extremities spontaneously    Medications:  Current Outpatient Medications   Medication Instructions    albuterol 90 mcg/actuation inhaler 1-2 puffs, inhalation, Every 6 hours PRN    ascorbic acid (VITAMIN C) 500 mg, oral, Daily    aspirin 81 mg, oral, Daily    atorvastatin (LIPITOR) 40 mg, oral, Nightly    carvedilol (Coreg) 25 mg tablet Take 1 tablet (25 mg) by mouth 2 times a day with meals.    chlorthalidone (HYGROTON) 25 mg, oral, Daily    famotidine (PEPCID) 20 mg, oral, 2 times daily    fexofenadine-pseudoephedrine (Allegra-D 24) 180-240 mg 24 hr tablet 1 tablet, oral, Daily PRN, Do not crush, chew, or split.    lubricating eye drops (polyvinyl alcohol-povidon,PF,) ophthalmic solution 1 drop, Both Eyes, As needed    omeprazole (PRILOSEC) 40 mg, oral, Daily, Do not crush or chew.    " sucralfate (CARAFATE) 1 g, oral, 4 times daily        Allergies:  Allergies   Allergen Reactions    Sulfa (Sulfonamide Antibiotics) Hives and Unknown    Latex Rash    Triethanolamine Oleate Rash             Assessment/Plan    Juju Adams is a 63 y.o. female with PMH of HTN, HLD, CAD, mild intermittent asthma, migraine headaches, renal cyst who presents to clinic for GERD.    Updates:  -Still has significant heart burn, on PPI BID and carafate, will add pepcid, send h pylori antigen, has GI follow-up next month  -recently admitted for N/V 2/2 ?gastroenteritis, was noted to be hyponatremic, stopped spironaloctone, will repeat RFP today  -repeat CBC, RFP, lipid panel, HA1c today  -Patient will bring FMLA paper work to be filled to clinic tomorrow       #GERD  - C/w omeprazole 20mg BID, Carafate, added famotidine,   - Has GI follow-up next month  - counseled on minimizing acidic food, eating close to bedtime     #Chest pain  #CAD  - Occasional substernal chest pains 1-2 times/year for past 2 years, relieved by nitroglycerine  - [facility] 3/2013: mild diffuse coronary disease, normal LV pressures  - Stress echo (3/2021): resting EF 60-65%, stress EF > 75%, normal stress echo  - Last EKG 2021: NSR, no abnormalities  - CTA 2/21/24 with mild-moderate RCA and LAD stenosis and CT score of 394  -Follows with cardiology, last seen 3/2024, pain likely noncardiac  Plan:  - On atorva 40/aspirin 81     #HLD  -Last Lipid panel (9/2023): TChol 241, HDL 67.6, ,   -ASCVD >20%  Plan:  -C/w atorva 40mg  -repeat lipid panel today     #HTN  -121/64 in clinic today  Plan:  -instructed to maintain daily BP log  - c/w coreg 25mg BID, chlorthalidone 25 daily      #Migraines  -Significantly improved, has 1 migraine every 1-2 months  Plan:  -Continue Sumatriptan 25 mg PRN        # Health maintenance  Last HbA1c: 5.3 in 9/23  Infectious: Hep C, HIV Not addressed  Vaccinations: Not addressed  Colonoscopy: colonoscopy in 11/23,  repeat in 7 years  Pap testing last done 11/23 normal, no longer needed, mammogram has referral for mammo  Low-dose CT chest: Not indicated  Bone density: Revisit at 64  AAA screening: Not indicated     Follow-up in 3 months     Discussed with Dr. Francis Delacruz MD  Internal Medicine PGY-2  [facility]  P [phone]  F [phone]

## 2024-08-01 NOTE — PATIENT INSTRUCTIONS
Thank you for visiting us today. It was a pleasure seeing you. Here is a summary of what we discussed:     For your acid reflux, I sent a prescription for pepcid, I also contacted GI to see if we can expedite your appointment. Please complete a stool test for H pylori (peptic ulcer disease).  2.   We obtained blood work today to assess your white cell count, your electrolytes, your cholesterol and to screen for diabetes  3.   You can bring your FMLA paper work tomorrow to be filled    Please return to clinic in 3 months or sooner if needed    Best,  Josh Delacruz MD  [facility]    To schedule a specialty appointment, please call [phone].     If you have any questions or concerns, please contact the [facility] at [phone].   Fax: [phone]

## 2024-08-02 ENCOUNTER — TELEPHONE (OUTPATIENT)
Dept: GASTROENTEROLOGY | Facility: HOSPITAL | Age: 64
End: 2024-08-02
Payer: COMMERCIAL

## 2024-08-02 ENCOUNTER — PHARMACY VISIT (OUTPATIENT)
Dept: PHARMACY | Facility: CLINIC | Age: 64
End: 2024-08-02
Payer: COMMERCIAL

## 2024-08-02 PROCEDURE — RXMED WILLOW AMBULATORY MEDICATION CHARGE

## 2024-08-02 RX ORDER — ATORVASTATIN CALCIUM 40 MG/1
80 TABLET, FILM COATED ORAL NIGHTLY
Qty: 90 TABLET | Refills: 1 | Status: SHIPPED | OUTPATIENT
Start: 2024-08-02

## 2024-08-06 ENCOUNTER — TELEPHONE (OUTPATIENT)
Dept: PRIMARY CARE | Facility: HOSPITAL | Age: 64
End: 2024-08-06
Payer: COMMERCIAL

## 2024-08-06 NOTE — TELEPHONE ENCOUNTER
Patients mammogram need to be changed to Diagnostic due to lump in breast. Please change referral and let place know to schedule. [phone]

## 2024-08-07 ENCOUNTER — PATIENT OUTREACH (OUTPATIENT)
Dept: CARE COORDINATION | Facility: CLINIC | Age: 64
End: 2024-08-07
Payer: COMMERCIAL

## 2024-08-08 ENCOUNTER — HOSPITAL ENCOUNTER (OUTPATIENT)
Dept: RADIOLOGY | Facility: HOSPITAL | Age: 64
Discharge: HOME | End: 2024-08-08
Payer: COMMERCIAL

## 2024-08-08 VITALS — WEIGHT: 170 LBS | BODY MASS INDEX: 30.12 KG/M2 | HEIGHT: 63 IN

## 2024-08-08 DIAGNOSIS — N63.20 UNSPECIFIED LUMP IN THE LEFT BREAST, UNSPECIFIED QUADRANT: ICD-10-CM

## 2024-08-08 DIAGNOSIS — N63.20 MASS OF LEFT BREAST, UNSPECIFIED QUADRANT: ICD-10-CM

## 2024-08-08 DIAGNOSIS — N63.0 BREAST LUMP: ICD-10-CM

## 2024-08-08 PROCEDURE — 77062 BREAST TOMOSYNTHESIS BI: CPT

## 2024-08-08 PROCEDURE — 76982 USE 1ST TARGET LESION: CPT | Mod: RT

## 2024-08-08 PROCEDURE — 77062 BREAST TOMOSYNTHESIS BI: CPT | Performed by: STUDENT IN AN ORGANIZED HEALTH CARE EDUCATION/TRAINING PROGRAM

## 2024-08-08 PROCEDURE — 76642 ULTRASOUND BREAST LIMITED: CPT | Mod: RT

## 2024-08-08 PROCEDURE — 76642 ULTRASOUND BREAST LIMITED: CPT | Performed by: STUDENT IN AN ORGANIZED HEALTH CARE EDUCATION/TRAINING PROGRAM

## 2024-08-08 PROCEDURE — 77066 DX MAMMO INCL CAD BI: CPT | Performed by: STUDENT IN AN ORGANIZED HEALTH CARE EDUCATION/TRAINING PROGRAM

## 2024-08-09 ENCOUNTER — APPOINTMENT (OUTPATIENT)
Dept: GASTROENTEROLOGY | Facility: CLINIC | Age: 64
End: 2024-08-09
Payer: COMMERCIAL

## 2024-08-14 ENCOUNTER — PATIENT OUTREACH (OUTPATIENT)
Dept: CARE COORDINATION | Facility: CLINIC | Age: 64
End: 2024-08-14
Payer: COMMERCIAL

## 2024-08-14 NOTE — PROGRESS NOTES
[facility] follow up:  call placed and VMM left.  Will try to reach again one more time in 1 week    Anjana Lopez RN [facility]  [phone]

## 2024-08-16 ENCOUNTER — APPOINTMENT (OUTPATIENT)
Dept: RADIOLOGY | Facility: CLINIC | Age: 64
End: 2024-08-16
Payer: COMMERCIAL

## 2024-08-16 ENCOUNTER — APPOINTMENT (OUTPATIENT)
Dept: PRIMARY CARE | Facility: HOSPITAL | Age: 64
End: 2024-08-16
Payer: COMMERCIAL

## 2024-08-19 NOTE — PROGRESS NOTES
Juju Adams is a 64 y.o. female with past medical history of HTN who presents today for follow up GERD. Fall  she started omeprazole which initially helped but then symptoms returned. Presents today for follow up due to flaring symptoms. She has burning abdominal discomfort, nausea, chronic bloating, and reflux but this has been worse the past 1-2 months despite her PPI and famotidine. TUMS does not work well. Also has had some intermittent dysphagia with pills. Seen in ER and Carafate helps some. But still unable to eat citrus, tomato based things, and coffee. Saw Cardiology and symptoms thought to be due to her GERD. She stopped NSAIDS but continues on aspirin. She has daily bowel movements with Colace. She has had intentional weight loss with exercise.    Colonoscopy 2023 showed internal hemorrhoids and 1 small tubular adenoma at hepatic flexure. Repeat 5-7 years.    Social history: Works at [employer] in [employer]. Her boyfriend is a . Prior tobacco. Quit . Denies alcohol and illicit drugs.     Family history: Denies family history of colon cancer or other GI disorders or malignancy. Sister had cancer in lymph nodes and another sister has diabetes. Mother and father  of MI/heart disease.     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    GERD (gastroesophageal reflux disease)     Hypertension     Other specified cough 2019    Post-viral cough syndrome    Personal history of other diseases of the respiratory system 2017    History of acute bronchitis with bronchospasm    Personal history of other diseases of the respiratory system 2018    History of acute sinusitis    Personal history of other drug therapy 2016    History of influenza vaccination    Personal history of other infectious and parasitic diseases 2015    History of herpes zoster     Past Surgical History:   Procedure Laterality Date     TOTAL HIP ARTHROPLASTY  02/13/2014    Total Hip Replacement    TUBAL LIGATION  01/04/2018    Tubal Ligation     Current Outpatient Medications   Medication Sig Dispense Refill    albuterol 90 mcg/actuation inhaler Inhale 1-2 puffs every 6 hours if needed for wheezing or shortness of breath.      ascorbic acid (Vitamin C) 500 mg tablet Take 1 tablet (500 mg) by mouth once daily.      aspirin 81 mg EC tablet Take 1 tablet (81 mg) by mouth once daily.      atorvastatin (Lipitor) 40 mg tablet Take 2 tablets (80 mg) by mouth once daily at bedtime. 90 tablet 1    carvedilol (Coreg) 25 mg tablet Take 1 tablet (25 mg) by mouth 2 times a day with meals. 180 tablet 1    chlorthalidone (Hygroton) 25 mg tablet Take 1 tablet (25 mg) by mouth once daily. 90 tablet 1    famotidine (Pepcid) 20 mg tablet Take 1 tablet (20 mg) by mouth 2 times a day. 60 tablet 5    fexofenadine-pseudoephedrine (Allegra-D 24) 180-240 mg 24 hr tablet Take 1 tablet by mouth once daily as needed for allergies. Do not crush, chew, or split.      omeprazole (PriLOSEC) 40 mg DR capsule Take 1 capsule (40 mg) by mouth once daily. Do not crush or chew. (Patient not taking: Reported on 8/26/2024) 30 capsule 0     No current facility-administered medications for this visit.     Review of Systems  Review of Systems negative except as noted in HPI.    Objective     /69   Pulse 76   Temp 36.2 °C (97.1 °F)   Wt 80.1 kg (176 lb 9.6 oz)   SpO2 98%   BMI 31.28 kg/m²      Physical Exam  Constitutional:  No acute distress. Normal appearance. Not ill-appearing.  HENT:  Head normocephalic and atraumatic. Conjunctivae normal.  Cardiovascular:  Normal rate. Regular rhythm.  Pulmonary:  Pulmonary effort normal. No respiratory distress. Breath sounds clear.  Abdominal:  Abdomen is soft. There is no distension. No tenderness or guarding.  Skin: Dry.  Neurological:  Alert and oriented.  Psychiatric:  Mood and affect normal.    Assessment/Plan     64 y.o. female  with history of HTN and hyperlipidemia who presents today for clinic visit for flaring reflux despite adequate PPI therapy. She also has some intermittent dysphagia. EGD is indicated. Discussed procedure and she is agreeable.     Recommendations  Avoid NSAIDS.  Continue Omeprazole 40 mg daily.  Continue Carafate as needed.  Schedule EGD.  Follow up after procedure.     Electronically signed by: Renata Coleman CNP on 8/26/2024 at 8:36 AM

## 2024-08-22 ENCOUNTER — PATIENT OUTREACH (OUTPATIENT)
Dept: CARE COORDINATION | Facility: CLINIC | Age: 64
End: 2024-08-22
Payer: COMMERCIAL

## 2024-08-22 NOTE — PROGRESS NOTES
[facility] [place]:  Juju has a gastro appt next week.  Will follow up with her afterwards    Anjana Lopez RN [facility]  [phone]

## 2024-08-26 ENCOUNTER — OFFICE VISIT (OUTPATIENT)
Dept: GASTROENTEROLOGY | Facility: HOSPITAL | Age: 64
End: 2024-08-26
Payer: COMMERCIAL

## 2024-08-26 VITALS
OXYGEN SATURATION: 98 % | TEMPERATURE: 97.1 F | HEART RATE: 76 BPM | BODY MASS INDEX: 31.28 KG/M2 | WEIGHT: 176.6 LBS | SYSTOLIC BLOOD PRESSURE: 108 MMHG | DIASTOLIC BLOOD PRESSURE: 69 MMHG

## 2024-08-26 DIAGNOSIS — R13.10 DYSPHAGIA, UNSPECIFIED TYPE: ICD-10-CM

## 2024-08-26 DIAGNOSIS — K21.9 CHRONIC GERD: Primary | ICD-10-CM

## 2024-08-26 DIAGNOSIS — R12 BURNING REFLUX: ICD-10-CM

## 2024-08-26 PROCEDURE — 99214 OFFICE O/P EST MOD 30 MIN: CPT | Performed by: NURSE PRACTITIONER

## 2024-08-26 PROCEDURE — 3074F SYST BP LT 130 MM HG: CPT | Performed by: NURSE PRACTITIONER

## 2024-08-26 PROCEDURE — 1126F AMNT PAIN NOTED NONE PRSNT: CPT | Performed by: NURSE PRACTITIONER

## 2024-08-26 PROCEDURE — 1036F TOBACCO NON-USER: CPT | Performed by: NURSE PRACTITIONER

## 2024-08-26 PROCEDURE — 3078F DIAST BP <80 MM HG: CPT | Performed by: NURSE PRACTITIONER

## 2024-08-26 PROCEDURE — 1159F MED LIST DOCD IN RCRD: CPT | Performed by: NURSE PRACTITIONER

## 2024-08-26 PROCEDURE — 1157F ADVNC CARE PLAN IN RCRD: CPT | Performed by: NURSE PRACTITIONER

## 2024-08-26 RX ORDER — SUCRALFATE 1 G/1
1 TABLET ORAL
Qty: 120 TABLET | Refills: 1 | Status: SHIPPED | OUTPATIENT
Start: 2024-08-26

## 2024-08-26 SDOH — ECONOMIC STABILITY: FOOD INSECURITY: WITHIN THE PAST 12 MONTHS, THE FOOD YOU BOUGHT JUST DIDN'T LAST AND YOU DIDN'T HAVE MONEY TO GET MORE.: NEVER TRUE

## 2024-08-26 SDOH — ECONOMIC STABILITY: FOOD INSECURITY: WITHIN THE PAST 12 MONTHS, YOU WORRIED THAT YOUR FOOD WOULD RUN OUT BEFORE YOU GOT MONEY TO BUY MORE.: NEVER TRUE

## 2024-08-26 ASSESSMENT — LIFESTYLE VARIABLES
HOW MANY STANDARD DRINKS CONTAINING ALCOHOL DO YOU HAVE ON A TYPICAL DAY: PATIENT DOES NOT DRINK
AUDIT-C TOTAL SCORE: 0
SKIP TO QUESTIONS 9-10: 1
HOW OFTEN DO YOU HAVE A DRINK CONTAINING ALCOHOL: NEVER
HOW OFTEN DO YOU HAVE SIX OR MORE DRINKS ON ONE OCCASION: NEVER

## 2024-08-26 ASSESSMENT — PATIENT HEALTH QUESTIONNAIRE - PHQ9
1. LITTLE INTEREST OR PLEASURE IN DOING THINGS: NOT AT ALL
SUM OF ALL RESPONSES TO PHQ9 QUESTIONS 1 & 2: 0
2. FEELING DOWN, DEPRESSED OR HOPELESS: NOT AT ALL

## 2024-08-26 ASSESSMENT — PAIN SCALES - GENERAL: PAINLEVEL: 0-NO PAIN

## 2024-08-26 NOTE — PATIENT INSTRUCTIONS
Recommendations  Avoid NSAIDS.  Continue Omeprazole 40 mg daily.  Continue Carafate as needed.  Schedule EGD (upper endoscopy). You can call [phone] to schedule.   Follow up after procedure. Please call the office at [phone] with any questions or concerns.

## 2024-08-28 DIAGNOSIS — R07.9 CHEST PAIN, UNSPECIFIED TYPE: ICD-10-CM

## 2024-08-28 PROCEDURE — RXMED WILLOW AMBULATORY MEDICATION CHARGE

## 2024-08-29 ENCOUNTER — HOSPITAL ENCOUNTER (EMERGENCY)
Facility: HOSPITAL | Age: 64
Discharge: HOME | End: 2024-08-29
Attending: STUDENT IN AN ORGANIZED HEALTH CARE EDUCATION/TRAINING PROGRAM
Payer: COMMERCIAL

## 2024-08-29 VITALS
DIASTOLIC BLOOD PRESSURE: 86 MMHG | RESPIRATION RATE: 20 BRPM | HEART RATE: 77 BPM | OXYGEN SATURATION: 97 % | SYSTOLIC BLOOD PRESSURE: 141 MMHG | WEIGHT: 176 LBS | BODY MASS INDEX: 31.18 KG/M2 | HEIGHT: 63 IN | TEMPERATURE: 95.7 F

## 2024-08-29 DIAGNOSIS — L03.011 CELLULITIS OF FINGER OF RIGHT HAND: Primary | ICD-10-CM

## 2024-08-29 PROCEDURE — 2500000001 HC RX 250 WO HCPCS SELF ADMINISTERED DRUGS (ALT 637 FOR MEDICARE OP): Performed by: STUDENT IN AN ORGANIZED HEALTH CARE EDUCATION/TRAINING PROGRAM

## 2024-08-29 PROCEDURE — 99283 EMERGENCY DEPT VISIT LOW MDM: CPT

## 2024-08-29 RX ORDER — DOXYCYCLINE 100 MG/1
100 CAPSULE ORAL ONCE
Status: COMPLETED | OUTPATIENT
Start: 2024-08-29 | End: 2024-08-29

## 2024-08-29 RX ORDER — DOXYCYCLINE 100 MG/1
100 CAPSULE ORAL 2 TIMES DAILY
Qty: 14 CAPSULE | Refills: 0 | Status: SHIPPED | OUTPATIENT
Start: 2024-08-29 | End: 2024-09-05

## 2024-08-29 RX ADMIN — DOXYCYCLINE HYCLATE 100 MG: 100 CAPSULE ORAL at 02:14

## 2024-08-29 ASSESSMENT — PAIN DESCRIPTION - PAIN TYPE: TYPE: ACUTE PAIN

## 2024-08-29 ASSESSMENT — PAIN SCALES - GENERAL: PAINLEVEL_OUTOF10: 7

## 2024-08-29 ASSESSMENT — PAIN - FUNCTIONAL ASSESSMENT: PAIN_FUNCTIONAL_ASSESSMENT: 0-10

## 2024-08-29 NOTE — DISCHARGE INSTRUCTIONS
You have been given a prescription for antibiotics for infection in your finger/hand.  You should return to the emergency department if you have any worsening swelling or pain despite taking antibiotics.    It is important to remember that your care does not end here and you must continue to monitor your condition closely. Please return to the emergency department for any worsening or concerning signs or symptoms as directed by our conversations and the discharge instructions. If you do not have a doctor please contact the referral number on your discharge instructions. Please contact any physician specialists provided in your discharge notes as it is very important to follow up with them regarding your condition. If you are unable to reach the physicians provided, please come back to the Emergency Department at any time.    Return to emergency room without delay for ANY new or worsening pains or for any other symptoms or concerns.  Return with worsening pains, nausea, vomiting, trouble breathing, palpitations, shortness of breath, inability to pass stool or urine, loss of control of stool or urine, any numbness or tingling (that is not normal for you), uncontrolled fevers, the passing of blood or other material in stool or urine, rashes, pains or for any other symptoms or concerns you may have.  You are always welcome to return to the ER at any time for any reason or for any other concerns you may have.

## 2024-08-29 NOTE — ED PROVIDER NOTES
HPI   Chief Complaint   Patient presents with    Hand Pain       Patient presents with pain in her right hand.  She reports that for the last several weeks, she has had some pain in her hand following having her nails done.  She questions whether there could be an infection.  She reports that she has had some nausea.  Her right middle finger has been swollen and the tip.              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    GERD (gastroesophageal reflux disease)     Hypertension     Other specified cough 02/18/2019    Post-viral cough syndrome    Personal history of other diseases of the respiratory system 12/26/2017    History of acute bronchitis with bronchospasm    Personal history of other diseases of the respiratory system 07/31/2018    History of acute sinusitis    Personal history of other drug therapy 11/01/2016    History of influenza vaccination    Personal history of other infectious and parasitic diseases 04/13/2015    History of herpes zoster     Past Surgical History:   Procedure Laterality Date    TOTAL HIP ARTHROPLASTY  02/13/2014    Total Hip Replacement    TUBAL LIGATION  01/04/2018    Tubal Ligation     Family History   Problem Relation Name Age of Onset    Hypertension Mother      Heart attack Mother      Hypertension Father      Heart attack Father      Stroke Father      Diabetes Sister      Hypertension Sister      Seizures Sister       Social History     Tobacco Use    Smoking status: Former     Current packs/day: 0.50     Average packs/day: 0.5 packs/day for 40.0 years (20.0 ttl pk-yrs)     Types: Cigarettes     Passive exposure: Past    Smokeless tobacco: Never   Vaping Use    Vaping status: Never Used   Substance Use Topics    Alcohol use: Never    Drug use: Not Currently     Types: Marijuana       Physical Exam   ED Triage Vitals [08/29/24 0152]   Temperature Heart Rate Respirations BP   35.4 °C  (95.7 °F) 77 20 141/86      Pulse Ox Temp Source Heart Rate Source Patient Position   97 % Temporal -- --      BP Location FiO2 (%)     -- --       Physical Exam  Cardiovascular:      Rate and Rhythm: Normal rate.   Pulmonary:      Effort: Pulmonary effort is normal.   Musculoskeletal:      Comments: There is some swelling and erythema of the distal portion of the right middle finger.  The skin is not tense.  There is full range of motion of the joints in the finger.   Neurological:      Mental Status: She is alert.           ED Course & MDM   Diagnoses as of 08/29/24 0207   Cellulitis of finger of right hand                 No data recorded     Jose Angel Coma Scale Score: 15 (08/29/24 0151 : Lesly Gray RN)                           Medical Decision Making  Patient presents with swelling and redness primarily of the right middle finger.  I do not see any signs of paronychia to drain.  Patient not felt to have a felon.  My suspicion for flexor tenosynovitis is very low based on examination.  I do suspect cellulitis however.  Patient was placed on course of antibiotics.  Patient advised to follow-up with primary care physician.  Return precautions given for any worsening symptoms.  Parts of this chart were completed with dictation software, please excuse any errors in transcription.        Procedure  Procedures     Corbin Navarro MD  08/29/24 0210

## 2024-08-30 ENCOUNTER — PATIENT OUTREACH (OUTPATIENT)
Dept: CARE COORDINATION | Facility: CLINIC | Age: 64
End: 2024-08-30
Payer: COMMERCIAL

## 2024-08-30 ENCOUNTER — PHARMACY VISIT (OUTPATIENT)
Dept: PHARMACY | Facility: CLINIC | Age: 64
End: 2024-08-30
Payer: COMMERCIAL

## 2024-08-30 NOTE — PROGRESS NOTES
[facility] [facility] follow up:  Juju reports she is doing OK, did go to the ED yesterday because her rt middle finger and hand sweeled up, dx with cellulitis.  Started on doxycycline which she is taking every twelve hours.  Did let her know that she can spread the administration out a bit so that she doesn't have to wake up in the middle of the night to take it.  She understands.  She has not yet scheduled a PCP appt, but will.  Will close the [facility].  Veronica has this CM's name and number erik needs arise    Anjana Lopez RN [facility]  [phone]

## 2024-09-01 RX ORDER — CARVEDILOL 25 MG/1
25 TABLET ORAL
Qty: 180 TABLET | Refills: 0 | Status: SHIPPED | OUTPATIENT
Start: 2024-09-01

## 2024-09-07 DIAGNOSIS — M79.89 LEG SWELLING: ICD-10-CM

## 2024-09-09 RX ORDER — CHLORTHALIDONE 25 MG/1
25 TABLET ORAL DAILY
Qty: 90 TABLET | Refills: 1 | Status: SHIPPED | OUTPATIENT
Start: 2024-09-09

## 2024-09-16 ENCOUNTER — APPOINTMENT (OUTPATIENT)
Dept: GASTROENTEROLOGY | Facility: HOSPITAL | Age: 64
End: 2024-09-16
Payer: COMMERCIAL

## 2024-09-19 DIAGNOSIS — K21.9 CHRONIC GERD: ICD-10-CM

## 2024-09-19 DIAGNOSIS — R12 BURNING REFLUX: ICD-10-CM

## 2024-09-19 RX ORDER — SUCRALFATE 1 G/1
1 TABLET ORAL
Qty: 120 TABLET | Refills: 1 | Status: SHIPPED | OUTPATIENT
Start: 2024-09-19

## 2024-09-23 DIAGNOSIS — R12 BURNING REFLUX: ICD-10-CM

## 2024-09-23 DIAGNOSIS — K21.9 CHRONIC GERD: ICD-10-CM

## 2024-09-24 RX ORDER — SUCRALFATE 1 G/1
1 TABLET ORAL
Qty: 120 TABLET | Refills: 1 | Status: SHIPPED | OUTPATIENT
Start: 2024-09-24 | End: 2024-09-25 | Stop reason: SDUPTHER

## 2024-09-25 DIAGNOSIS — R12 BURNING REFLUX: ICD-10-CM

## 2024-09-25 DIAGNOSIS — K21.9 CHRONIC GERD: ICD-10-CM

## 2024-09-25 RX ORDER — SUCRALFATE 1 G/1
1 TABLET ORAL
Qty: 360 TABLET | Refills: 3 | Status: SHIPPED | OUTPATIENT
Start: 2024-09-25

## 2024-10-03 ENCOUNTER — PHARMACY VISIT (OUTPATIENT)
Dept: PHARMACY | Facility: CLINIC | Age: 64
End: 2024-10-03
Payer: COMMERCIAL

## 2024-10-03 ENCOUNTER — HOSPITAL ENCOUNTER (EMERGENCY)
Facility: HOSPITAL | Age: 64
Discharge: HOME | End: 2024-10-04
Attending: EMERGENCY MEDICINE
Payer: COMMERCIAL

## 2024-10-03 VITALS
HEART RATE: 70 BPM | DIASTOLIC BLOOD PRESSURE: 84 MMHG | HEIGHT: 63 IN | RESPIRATION RATE: 16 BRPM | BODY MASS INDEX: 31.18 KG/M2 | SYSTOLIC BLOOD PRESSURE: 150 MMHG | OXYGEN SATURATION: 100 % | WEIGHT: 176 LBS | TEMPERATURE: 97.9 F

## 2024-10-03 DIAGNOSIS — R51.9 SINUS HEADACHE: ICD-10-CM

## 2024-10-03 DIAGNOSIS — R07.9 CHEST PAIN, UNSPECIFIED TYPE: ICD-10-CM

## 2024-10-03 DIAGNOSIS — J01.11 ACUTE RECURRENT FRONTAL SINUSITIS: Primary | ICD-10-CM

## 2024-10-03 DIAGNOSIS — R11.0 NAUSEA: ICD-10-CM

## 2024-10-03 DIAGNOSIS — B34.9 VIRAL SYNDROME: ICD-10-CM

## 2024-10-03 PROCEDURE — RXMED WILLOW AMBULATORY MEDICATION CHARGE

## 2024-10-03 PROCEDURE — 99284 EMERGENCY DEPT VISIT MOD MDM: CPT

## 2024-10-03 RX ORDER — CARVEDILOL 25 MG/1
25 TABLET ORAL
Qty: 180 TABLET | Refills: 3 | Status: SHIPPED | OUTPATIENT
Start: 2024-10-03 | End: 2025-10-03

## 2024-10-03 ASSESSMENT — LIFESTYLE VARIABLES
EVER FELT BAD OR GUILTY ABOUT YOUR DRINKING: NO
EVER HAD A DRINK FIRST THING IN THE MORNING TO STEADY YOUR NERVES TO GET RID OF A HANGOVER: NO
TOTAL SCORE: 0
HAVE YOU EVER FELT YOU SHOULD CUT DOWN ON YOUR DRINKING: NO
HAVE PEOPLE ANNOYED YOU BY CRITICIZING YOUR DRINKING: NO

## 2024-10-03 ASSESSMENT — PAIN DESCRIPTION - PAIN TYPE: TYPE: ACUTE PAIN

## 2024-10-03 ASSESSMENT — PAIN - FUNCTIONAL ASSESSMENT: PAIN_FUNCTIONAL_ASSESSMENT: 0-10

## 2024-10-03 ASSESSMENT — PAIN DESCRIPTION - LOCATION: LOCATION: FACE

## 2024-10-03 ASSESSMENT — PAIN SCALES - GENERAL: PAINLEVEL_OUTOF10: 8

## 2024-10-03 NOTE — Clinical Note
Patient's boyfriend at bedside for entire emergency department encounter accompanied Juju Adams to the emergency department on 10/3/2024. They may return to work on 10/05/2024.      If you have any questions or concerns, please don't hesitate to call.      Renata Vazquez MD

## 2024-10-03 NOTE — Clinical Note
Juju Adams was seen and treated in our emergency department on 10/3/2024.  She may return to work on 10/06/2024.       If you have any questions or concerns, please don't hesitate to call.      Renata Vazquez MD

## 2024-10-04 ENCOUNTER — APPOINTMENT (OUTPATIENT)
Dept: RADIOLOGY | Facility: HOSPITAL | Age: 64
End: 2024-10-04
Payer: COMMERCIAL

## 2024-10-04 LAB
ALBUMIN SERPL BCP-MCNC: 4.1 G/DL (ref 3.4–5)
ALP SERPL-CCNC: 95 U/L (ref 33–136)
ALT SERPL W P-5'-P-CCNC: 18 U/L (ref 7–45)
ANION GAP SERPL CALCULATED.3IONS-SCNC: 8 MMOL/L (ref 10–20)
AST SERPL W P-5'-P-CCNC: 15 U/L (ref 9–39)
BASOPHILS # BLD AUTO: 0.05 X10*3/UL (ref 0–0.1)
BASOPHILS NFR BLD AUTO: 0.8 %
BILIRUB SERPL-MCNC: 0.4 MG/DL (ref 0–1.2)
BUN SERPL-MCNC: 20 MG/DL (ref 6–23)
CALCIUM SERPL-MCNC: 9.4 MG/DL (ref 8.6–10.3)
CHLORIDE SERPL-SCNC: 103 MMOL/L (ref 98–107)
CO2 SERPL-SCNC: 28 MMOL/L (ref 21–32)
CREAT SERPL-MCNC: 0.76 MG/DL (ref 0.5–1.05)
EGFRCR SERPLBLD CKD-EPI 2021: 87 ML/MIN/1.73M*2
EOSINOPHIL # BLD AUTO: 0.12 X10*3/UL (ref 0–0.7)
EOSINOPHIL NFR BLD AUTO: 1.8 %
ERYTHROCYTE [DISTWIDTH] IN BLOOD BY AUTOMATED COUNT: 13 % (ref 11.5–14.5)
GLUCOSE SERPL-MCNC: 98 MG/DL (ref 74–99)
HCT VFR BLD AUTO: 39.5 % (ref 36–46)
HGB BLD-MCNC: 13.3 G/DL (ref 12–16)
IMM GRANULOCYTES # BLD AUTO: 0.02 X10*3/UL (ref 0–0.7)
IMM GRANULOCYTES NFR BLD AUTO: 0.3 % (ref 0–0.9)
LYMPHOCYTES # BLD AUTO: 2.5 X10*3/UL (ref 1.2–4.8)
LYMPHOCYTES NFR BLD AUTO: 38.4 %
MCH RBC QN AUTO: 32.4 PG (ref 26–34)
MCHC RBC AUTO-ENTMCNC: 33.7 G/DL (ref 32–36)
MCV RBC AUTO: 96 FL (ref 80–100)
MONOCYTES # BLD AUTO: 0.52 X10*3/UL (ref 0.1–1)
MONOCYTES NFR BLD AUTO: 8 %
NEUTROPHILS # BLD AUTO: 3.3 X10*3/UL (ref 1.2–7.7)
NEUTROPHILS NFR BLD AUTO: 50.7 %
NRBC BLD-RTO: 0 /100 WBCS (ref 0–0)
PLATELET # BLD AUTO: 243 X10*3/UL (ref 150–450)
POTASSIUM SERPL-SCNC: 3.3 MMOL/L (ref 3.5–5.3)
PROT SERPL-MCNC: 7.1 G/DL (ref 6.4–8.2)
RBC # BLD AUTO: 4.11 X10*6/UL (ref 4–5.2)
SODIUM SERPL-SCNC: 136 MMOL/L (ref 136–145)
WBC # BLD AUTO: 6.5 X10*3/UL (ref 4.4–11.3)

## 2024-10-04 PROCEDURE — 80053 COMPREHEN METABOLIC PANEL: CPT | Performed by: EMERGENCY MEDICINE

## 2024-10-04 PROCEDURE — 2500000004 HC RX 250 GENERAL PHARMACY W/ HCPCS (ALT 636 FOR OP/ED): Performed by: EMERGENCY MEDICINE

## 2024-10-04 PROCEDURE — 85025 COMPLETE CBC W/AUTO DIFF WBC: CPT | Performed by: EMERGENCY MEDICINE

## 2024-10-04 PROCEDURE — 70450 CT HEAD/BRAIN W/O DYE: CPT | Performed by: RADIOLOGY

## 2024-10-04 PROCEDURE — 96375 TX/PRO/DX INJ NEW DRUG ADDON: CPT

## 2024-10-04 PROCEDURE — 36415 COLL VENOUS BLD VENIPUNCTURE: CPT | Performed by: EMERGENCY MEDICINE

## 2024-10-04 PROCEDURE — 70450 CT HEAD/BRAIN W/O DYE: CPT

## 2024-10-04 PROCEDURE — 96374 THER/PROPH/DIAG INJ IV PUSH: CPT

## 2024-10-04 PROCEDURE — 96361 HYDRATE IV INFUSION ADD-ON: CPT

## 2024-10-04 RX ORDER — ACETAMINOPHEN 325 MG/1
650 TABLET ORAL EVERY 6 HOURS PRN
Qty: 30 TABLET | Refills: 0 | Status: SHIPPED | OUTPATIENT
Start: 2024-10-04 | End: 2024-10-14

## 2024-10-04 RX ORDER — METOCLOPRAMIDE 10 MG/1
10 TABLET ORAL EVERY 6 HOURS
Qty: 28 TABLET | Refills: 0 | Status: SHIPPED | OUTPATIENT
Start: 2024-10-04 | End: 2024-10-11

## 2024-10-04 RX ORDER — DIPHENHYDRAMINE HYDROCHLORIDE 50 MG/ML
50 INJECTION INTRAMUSCULAR; INTRAVENOUS ONCE
Status: COMPLETED | OUTPATIENT
Start: 2024-10-04 | End: 2024-10-04

## 2024-10-04 RX ORDER — FAMOTIDINE 10 MG/ML
20 INJECTION INTRAVENOUS ONCE
Status: COMPLETED | OUTPATIENT
Start: 2024-10-04 | End: 2024-10-04

## 2024-10-04 RX ORDER — DIPHENHYDRAMINE HCL 25 MG
50 TABLET ORAL NIGHTLY PRN
Qty: 20 TABLET | Refills: 0 | Status: SHIPPED | OUTPATIENT
Start: 2024-10-04 | End: 2024-10-14

## 2024-10-04 RX ORDER — CETIRIZINE HYDROCHLORIDE 10 MG/1
10 TABLET ORAL DAILY
Qty: 30 TABLET | Refills: 0 | Status: SHIPPED | OUTPATIENT
Start: 2024-10-04 | End: 2024-11-03

## 2024-10-04 RX ORDER — FLUTICASONE PROPIONATE 50 MCG
1 SPRAY, SUSPENSION (ML) NASAL DAILY
Qty: 16 G | Refills: 0 | Status: SHIPPED | OUTPATIENT
Start: 2024-10-04 | End: 2024-11-03

## 2024-10-04 RX ORDER — KETOROLAC TROMETHAMINE 30 MG/ML
15 INJECTION, SOLUTION INTRAMUSCULAR; INTRAVENOUS ONCE
Status: COMPLETED | OUTPATIENT
Start: 2024-10-04 | End: 2024-10-04

## 2024-10-04 RX ORDER — METOCLOPRAMIDE HYDROCHLORIDE 5 MG/ML
10 INJECTION INTRAMUSCULAR; INTRAVENOUS ONCE
Status: COMPLETED | OUTPATIENT
Start: 2024-10-04 | End: 2024-10-04

## 2024-10-04 NOTE — ED PROVIDER NOTES
HPI   Chief Complaint   Patient presents with    Facial Pain     Patient states has been having a headache with lots of pressure on front of forehead/sinus area and now B/L ear pain.,       64-year-old female with a history of hypertension, migraine headaches, comes to the emergency department with a chief complaint of headache.  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    GERD (gastroesophageal reflux disease)     Hypertension     Other specified cough 02/18/2019    Post-viral cough syndrome    Personal history of other diseases of the respiratory system 12/26/2017    History of acute bronchitis with bronchospasm    Personal history of other diseases of the respiratory system 07/31/2018    History of acute sinusitis    Personal history of other drug therapy 11/01/2016    History of influenza vaccination    Personal history of other infectious and parasitic diseases 04/13/2015    History of herpes zoster     Past Surgical History:   Procedure Laterality Date    TOTAL HIP ARTHROPLASTY  02/13/2014    Total Hip Replacement    TUBAL LIGATION  01/04/2018    Tubal Ligation     Family History   Problem Relation Name Age of Onset    Hypertension Mother      Heart attack Mother      Hypertension Father      Heart attack Father      Stroke Father      Diabetes Sister      Hypertension Sister      Seizures Sister       Social History     Tobacco Use    Smoking status: Former     Current packs/day: 0.50     Average packs/day: 0.5 packs/day for 40.0 years (20.0 ttl pk-yrs)     Types: Cigarettes     Passive exposure: Past    Smokeless tobacco: Never   Vaping Use    Vaping status: Never Used   Substance Use Topics    Alcohol use: Never    Drug use:  Not Currently     Types: Marijuana       Physical Exam   ED Triage Vitals [10/03/24 2309]   Temperature Heart Rate Respirations BP   36.6 °C (97.9 °F) 70 16 150/84      Pulse Ox Temp Source Heart Rate Source Patient Position   100 % Temporal Monitor Sitting      BP Location FiO2 (%)     Left arm --       Physical Exam  Vitals and nursing note reviewed.   Constitutional:       General: She is not in acute distress.     Appearance: She is well-developed.   HENT:      Head: Normocephalic and atraumatic.      Nose: Congestion present. No rhinorrhea.   Eyes:      Extraocular Movements: Extraocular movements intact.      Conjunctiva/sclera: Conjunctivae normal.      Pupils: Pupils are equal, round, and reactive to light.   Cardiovascular:      Rate and Rhythm: Normal rate and regular rhythm.      Heart sounds: No murmur heard.  Pulmonary:      Effort: Pulmonary effort is normal. No respiratory distress.      Breath sounds: Normal breath sounds.   Abdominal:      Palpations: Abdomen is soft.      Tenderness: There is no abdominal tenderness.   Musculoskeletal:         General: No swelling.      Cervical back: Neck supple.   Skin:     General: Skin is warm and dry.      Capillary Refill: Capillary refill takes less than 2 seconds.   Neurological:      General: No focal deficit present.      Mental Status: She is alert and oriented to person, place, and time.      Cranial Nerves: No cranial nerve deficit.      Sensory: No sensory deficit.      Motor: No weakness.      Coordination: Coordination normal.   Psychiatric:         Mood and Affect: Mood normal.           ED Course & MDM   ED Course as of 10/07/24 0321   Fri Oct 04, 2024   0153 Comprehensive metabolic panel(!)  Unremarkable except for minimal hypokalemia [EG]   0153 CBC and Auto Differential  Within normal limits [EG]   0246 CT head wo IV contrast  FINDINGS:  BRAIN PARENCHYMA:   The gray white matter differentiation is  preserved.  No mass effect or midline shift.       HEMORRHAGE:  No evidence of acute intracranial hemorrhage.  VENTRICLES AND EXTRA-AXIAL SPACES:  The ventricles are within normal  limits in size for brain volume.  No evidence of abnormal extraaxial  fluid collection. EXTRACRANIAL SOFT TISSUES:  Within normal limits.  PARANASAL SINUSES/MASTOIDS:  The visualized paranasal sinuses and  mastoid air cells are clear and well pneumatized. CALVARIUM:  No  evidence of depressed calvarial fracture.      OTHER FINDINGS:  None      IMPRESSION:  No evidence of acute intracranial hemorrhage or mass effect.   [EG]      ED Course User Index  [EG] Renata Vazquez MD         Diagnoses as of 10/07/24 0321   Acute recurrent frontal sinusitis   Sinus headache   Viral syndrome   Nausea                 No data recorded     Jose Angel Coma Scale Score: 15 (10/03/24 2310 : Mamie Ray RN)                           Medical Decision Making    HPI:  As Above  PMHx/PSHx/Meds/Allergies/SH/FH as per nursing documentation and reviewed.  Review of systems: Total of 10 systems reviewed and otherwise negative except as noted elsewhere    DDX: As described in MDM    If performed, radiology listed above interpreted by me and confirmed by the Radiologist.  Medications administered during this visit (name and route): see MAR  Social determinants of health considered for this visit: lives at home  If performed, EKG interpreted by me and detailed above    MDM Summary/considerations:  See ED course for details    64-year-old female presenting with complaint of facial pain that is most likely secondary from frontal sinusitis.  Patient's pain resolved in the emergency department and she will be discharged home and instructed to follow-up with the ENT and her primary care provider.    Prescriptions provided include: Oral Reglan, Tylenol, Zyrtec, diphenhydramine, intranasal Flonase    The patient was seen and triaged by our nursing/medic staff, their vitals were taken and the staff notes were  reviewed.  If the patient arrived by an EMS squad or an outside agency, we discussed the case with transporting EMS medic, police, or other historians. My initial assessment was attention to their airway, breathing, and circulatory status.  We addressed any immediate or life threatening findings and completed a medical history and a physical exam if the patient or those legally responsible were in agreement with this.   Prior to the patient being discharged, I or my PA/NP or the nursing staff discussed the differential, results and discharge plan with the patient and/or family/friend/caregiver if present.  I emphasized the importance of follow-up in 2-3 days unless otherwise specified.  I explained reasons for the patient to return to the Emergency Department. Additional verbal discharge instructions were also given and discussed with the patient to supplement those generated by the EMR. We also discussed medications that were prescribed (if any) including common side effects and interactions. The patient was advised to abstain from driving, operating heavy machinery or making significant decisions while taking medications such as antihistamines, benzodiazepines, opiates and muscle relaxers. All questions were addressed.  They understand return precautions and discharge instructions. The patient and/or family/friend/caregiver expressed understanding.  **Disclaimer:  This note was dictated by speech recognition technology.  Minor errors in transcription may be present.  Please contact for clarification or corrections.    In the case the patient eloped or refused treatment/admission, we offered to the best of our ability to provide care to the patient at the time of this encounter.    Amount and/or Complexity of Data Reviewed  Labs: ordered. Decision-making details documented in ED Course.  Radiology: ordered and independent interpretation performed. Decision-making details documented in ED  Course.        Procedure  Procedures     Renata Vazquez MD  10/07/24 9099

## 2024-11-22 ENCOUNTER — ANESTHESIA (OUTPATIENT)
Dept: GASTROENTEROLOGY | Facility: HOSPITAL | Age: 64
End: 2024-11-22
Payer: COMMERCIAL

## 2024-11-22 ENCOUNTER — HOSPITAL ENCOUNTER (OUTPATIENT)
Dept: GASTROENTEROLOGY | Facility: HOSPITAL | Age: 64
Discharge: HOME | End: 2024-11-22
Payer: COMMERCIAL

## 2024-11-22 ENCOUNTER — ANESTHESIA EVENT (OUTPATIENT)
Dept: GASTROENTEROLOGY | Facility: HOSPITAL | Age: 64
End: 2024-11-22
Payer: COMMERCIAL

## 2024-11-22 VITALS
WEIGHT: 180.78 LBS | BODY MASS INDEX: 32.03 KG/M2 | OXYGEN SATURATION: 97 % | HEIGHT: 63 IN | HEART RATE: 78 BPM | RESPIRATION RATE: 16 BRPM | DIASTOLIC BLOOD PRESSURE: 64 MMHG | SYSTOLIC BLOOD PRESSURE: 115 MMHG | TEMPERATURE: 97.2 F

## 2024-11-22 DIAGNOSIS — K21.9 CHRONIC GERD: ICD-10-CM

## 2024-11-22 DIAGNOSIS — R12 BURNING REFLUX: ICD-10-CM

## 2024-11-22 DIAGNOSIS — R13.10 DYSPHAGIA, UNSPECIFIED TYPE: ICD-10-CM

## 2024-11-22 PROBLEM — E66.9 OBESITY: Status: ACTIVE | Noted: 2024-11-22

## 2024-11-22 PROCEDURE — 43239 EGD BIOPSY SINGLE/MULTIPLE: CPT | Performed by: INTERNAL MEDICINE

## 2024-11-22 PROCEDURE — 3700000002 HC GENERAL ANESTHESIA TIME - EACH INCREMENTAL 1 MINUTE

## 2024-11-22 PROCEDURE — 7100000010 HC PHASE TWO TIME - EACH INCREMENTAL 1 MINUTE

## 2024-11-22 PROCEDURE — 7100000009 HC PHASE TWO TIME - INITIAL BASE CHARGE

## 2024-11-22 PROCEDURE — 2500000004 HC RX 250 GENERAL PHARMACY W/ HCPCS (ALT 636 FOR OP/ED)

## 2024-11-22 PROCEDURE — 3700000001 HC GENERAL ANESTHESIA TIME - INITIAL BASE CHARGE

## 2024-11-22 RX ORDER — PROPOFOL 10 MG/ML
INJECTION, EMULSION INTRAVENOUS CONTINUOUS PRN
Status: DISCONTINUED | OUTPATIENT
Start: 2024-11-22 | End: 2024-11-22

## 2024-11-22 RX ORDER — MIDAZOLAM HYDROCHLORIDE 1 MG/ML
INJECTION INTRAMUSCULAR; INTRAVENOUS AS NEEDED
Status: DISCONTINUED | OUTPATIENT
Start: 2024-11-22 | End: 2024-11-22

## 2024-11-22 ASSESSMENT — PAIN SCALES - GENERAL
PAINLEVEL_OUTOF10: 0 - NO PAIN

## 2024-11-22 ASSESSMENT — PAIN - FUNCTIONAL ASSESSMENT
PAIN_FUNCTIONAL_ASSESSMENT: 0-10

## 2024-11-22 NOTE — ANESTHESIA PREPROCEDURE EVALUATION
Patient: Juju Adams    Procedure Information       Date/Time: 11/22/24 0730    Scheduled providers: Vanessa Campos MD; Wallace Babin MD; REJI Song    Procedure: EGD    Location: [facility]            Relevant Problems   Cardiac   (+) Coronary artery disease involving native coronary artery of native heart   (+) Hyperlipidemia   (+) Hypertension      Pulmonary   (+) Mild intermittent asthma without complication (HHS-HCC)      GI   (+) Gastroesophageal reflux disease      Endocrine   (+) Goiter   (+) Obesity      Hematology   (+) Anemia      Musculoskeletal   (+) OA (osteoarthritis) of knee   (+) Spinal stenosis of lumbar region without neurogenic claudication       Clinical information reviewed:   Tobacco  Allergies  Meds   Med Hx  Surg Hx  OB Status  Fam Hx  Soc   Hx        NPO Detail:  NPO/Void Status  Carbohydrate Drink Given Prior to Surgery? : N  Date of Last Liquid: 11/21/24  Time of Last Liquid: 2130  Date of Last Solid: 11/21/24  Time of Last Solid: 2100  Last Intake Type: Clear fluids  Time of Last Void: 0600         Physical Exam    Airway  Mallampati: II     Cardiovascular   Rhythm: regular  Rate: normal     Dental    Pulmonary    Abdominal            Anesthesia Plan    History of general anesthesia?: yes  History of complications of general anesthesia?: no    ASA 3     MAC     intravenous induction   Anesthetic plan and risks discussed with patient.    Plan discussed with REJI.

## 2024-11-22 NOTE — H&P
History Of Present Illness  Juju Adams is a 64 y.o. female presenting with gerd.     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    GERD (gastroesophageal reflux disease)     Hypertension     Other specified cough 02/18/2019    Post-viral cough syndrome    Personal history of other diseases of the respiratory system 12/26/2017    History of acute bronchitis with bronchospasm    Personal history of other diseases of the respiratory system 07/31/2018    History of acute sinusitis    Personal history of other drug therapy 11/01/2016    History of influenza vaccination    Personal history of other infectious and parasitic diseases 04/13/2015    History of herpes zoster     Surgical History  Past Surgical History:   Procedure Laterality Date    TOTAL HIP ARTHROPLASTY  02/13/2014    Total Hip Replacement    TUBAL LIGATION  01/04/2018    Tubal Ligation     Social History  She reports that she has quit smoking. Her smoking use included cigarettes. She has a 20 pack-year smoking history. She has been exposed to tobacco smoke. She has never used smokeless tobacco. She reports that she does not currently use drugs after having used the following drugs: Marijuana. She reports that she does not drink alcohol.    Family History  Family History   Problem Relation Name Age of Onset    Hypertension Mother      Heart attack Mother      Hypertension Father      Heart attack Father      Stroke Father      Diabetes Sister      Hypertension Sister      Seizures Sister          Allergies  Allergies   Allergen Reactions    Sulfa (Sulfonamide Antibiotics) Hives and Unknown    Latex Rash    Triethanolamine Oleate Rash     Review of Systems     Physical Exam     Last Recorded Vitals  There were no vitals taken for this visit.    Assessment/Plan   egd   Possible dilation  Vanessa Campos MD

## 2024-11-22 NOTE — ANESTHESIA POSTPROCEDURE EVALUATION
Patient: Juju Adams    Procedure Summary       Date: 11/22/24 Room / Location: [facility]    Anesthesia Start: 0739 Anesthesia Stop: 0755    Procedure: EGD Diagnosis:       Burning reflux      Chronic GERD      Dysphagia, unspecified type    Scheduled Providers: Vanessa Campos MD; Wallace Babin MD; REJI Song; Vanessa Ferris RN Responsible Provider: Wallace Babin MD    Anesthesia Type: MAC ASA Status: 3            Anesthesia Type: MAC    Vitals Value Taken Time   /63 11/22/24 0751   Temp 36.2 °C (97.2 °F) 11/22/24 0751   Pulse 76 11/22/24 0751   Resp 24 11/22/24 0751   SpO2 100 % 11/22/24 0751       Anesthesia Post Evaluation    Patient location during evaluation: PACU  Patient participation: complete - patient participated  Level of consciousness: awake  Pain management: adequate  Airway patency: patent  Cardiovascular status: acceptable  Respiratory status: acceptable  Hydration status: acceptable  Postoperative Nausea and Vomiting: none        No notable events documented.

## 2024-12-27 DIAGNOSIS — E54 VITAMIN C DEFICIENCY: Primary | ICD-10-CM

## 2024-12-27 DIAGNOSIS — I25.10 CORONARY ARTERY DISEASE INVOLVING NATIVE CORONARY ARTERY OF NATIVE HEART, UNSPECIFIED WHETHER ANGINA PRESENT: ICD-10-CM

## 2024-12-27 DIAGNOSIS — R07.9 CHEST PAIN, UNSPECIFIED TYPE: ICD-10-CM

## 2024-12-27 DIAGNOSIS — E78.2 MIXED HYPERLIPIDEMIA: ICD-10-CM

## 2024-12-27 RX ORDER — CARVEDILOL 25 MG/1
25 TABLET ORAL
Qty: 180 TABLET | Refills: 0 | Status: SHIPPED | OUTPATIENT
Start: 2024-12-27 | End: 2024-12-30 | Stop reason: SDUPTHER

## 2024-12-27 RX ORDER — ATORVASTATIN CALCIUM 40 MG/1
80 TABLET, FILM COATED ORAL NIGHTLY
Qty: 180 TABLET | Refills: 0 | Status: SHIPPED | OUTPATIENT
Start: 2024-12-27 | End: 2025-03-27

## 2024-12-27 RX ORDER — ASCORBIC ACID 500 MG
500 TABLET ORAL DAILY
Qty: 90 TABLET | Refills: 0 | Status: SHIPPED | OUTPATIENT
Start: 2024-12-27 | End: 2025-03-27

## 2024-12-27 RX ORDER — ASPIRIN 81 MG/1
81 TABLET ORAL DAILY
Qty: 90 TABLET | Refills: 0 | Status: SHIPPED | OUTPATIENT
Start: 2024-12-27 | End: 2025-03-27

## 2024-12-27 NOTE — TELEPHONE ENCOUNTER
----- Message from Doreen TRAN sent at 12/27/2024  1:56 PM EST -----  Regarding: Needs appointment  Overdue for follow up, please reach out, thank you.

## 2024-12-30 ENCOUNTER — PHARMACY VISIT (OUTPATIENT)
Dept: PHARMACY | Facility: CLINIC | Age: 64
End: 2024-12-30
Payer: COMMERCIAL

## 2024-12-30 ENCOUNTER — OFFICE VISIT (OUTPATIENT)
Dept: PRIMARY CARE | Facility: HOSPITAL | Age: 64
End: 2024-12-30
Payer: COMMERCIAL

## 2024-12-30 VITALS
HEART RATE: 96 BPM | TEMPERATURE: 97 F | SYSTOLIC BLOOD PRESSURE: 133 MMHG | BODY MASS INDEX: 32.25 KG/M2 | HEIGHT: 63 IN | WEIGHT: 182 LBS | OXYGEN SATURATION: 99 % | DIASTOLIC BLOOD PRESSURE: 87 MMHG

## 2024-12-30 DIAGNOSIS — K21.9 GASTROESOPHAGEAL REFLUX DISEASE, UNSPECIFIED WHETHER ESOPHAGITIS PRESENT: Primary | ICD-10-CM

## 2024-12-30 DIAGNOSIS — M79.89 LEG SWELLING: ICD-10-CM

## 2024-12-30 DIAGNOSIS — I25.10 CORONARY ARTERY DISEASE INVOLVING NATIVE CORONARY ARTERY OF NATIVE HEART, UNSPECIFIED WHETHER ANGINA PRESENT: ICD-10-CM

## 2024-12-30 DIAGNOSIS — M85.80 OSTEOPENIA, UNSPECIFIED LOCATION: ICD-10-CM

## 2024-12-30 DIAGNOSIS — K21.9 CHRONIC GERD: ICD-10-CM

## 2024-12-30 DIAGNOSIS — R12 BURNING REFLUX: ICD-10-CM

## 2024-12-30 PROCEDURE — RXMED WILLOW AMBULATORY MEDICATION CHARGE

## 2024-12-30 PROCEDURE — 3008F BODY MASS INDEX DOCD: CPT

## 2024-12-30 PROCEDURE — 99213 OFFICE O/P EST LOW 20 MIN: CPT

## 2024-12-30 PROCEDURE — 3079F DIAST BP 80-89 MM HG: CPT

## 2024-12-30 PROCEDURE — 99213 OFFICE O/P EST LOW 20 MIN: CPT | Mod: GE

## 2024-12-30 PROCEDURE — 1157F ADVNC CARE PLAN IN RCRD: CPT

## 2024-12-30 PROCEDURE — 3075F SYST BP GE 130 - 139MM HG: CPT

## 2024-12-30 PROCEDURE — 1036F TOBACCO NON-USER: CPT

## 2024-12-30 PROCEDURE — 1159F MED LIST DOCD IN RCRD: CPT

## 2024-12-30 RX ORDER — CARVEDILOL 25 MG/1
25 TABLET ORAL
Qty: 180 TABLET | Refills: 0 | Status: SHIPPED | OUTPATIENT
Start: 2024-12-30 | End: 2025-03-30

## 2024-12-30 RX ORDER — OMEPRAZOLE 40 MG/1
40 CAPSULE, DELAYED RELEASE ORAL DAILY
Qty: 30 CAPSULE | Refills: 0 | Status: SHIPPED | OUTPATIENT
Start: 2024-12-30 | End: 2025-01-29

## 2024-12-30 RX ORDER — CHLORTHALIDONE 25 MG/1
25 TABLET ORAL DAILY
Qty: 90 TABLET | Refills: 1 | Status: SHIPPED | OUTPATIENT
Start: 2024-12-30

## 2024-12-30 RX ORDER — SUCRALFATE 1 G/1
1 TABLET ORAL
Qty: 360 TABLET | Refills: 3 | Status: SHIPPED | OUTPATIENT
Start: 2024-12-30

## 2024-12-30 RX ORDER — FAMOTIDINE 20 MG/1
20 TABLET, FILM COATED ORAL 2 TIMES DAILY
Qty: 180 EACH | Refills: 1 | Status: SHIPPED | OUTPATIENT
Start: 2024-12-30 | End: 2025-06-28

## 2024-12-30 ASSESSMENT — PATIENT HEALTH QUESTIONNAIRE - PHQ9
SUM OF ALL RESPONSES TO PHQ9 QUESTIONS 1 AND 2: 0
1. LITTLE INTEREST OR PLEASURE IN DOING THINGS: NOT AT ALL
2. FEELING DOWN, DEPRESSED OR HOPELESS: NOT AT ALL

## 2024-12-30 ASSESSMENT — ENCOUNTER SYMPTOMS
LOSS OF SENSATION IN FEET: 0
OCCASIONAL FEELINGS OF UNSTEADINESS: 0
DEPRESSION: 0

## 2024-12-30 NOTE — PATIENT INSTRUCTIONS
As discussed today, our plan is:     Labs - we collected labs today and will call you with any abnormal results. If you have any questions or concerns prior to us calling feel free to call our office to have your questions addressed.   Medication changes: Refilled blood pressure medications, omeprazole, and pepcid  Consultations - you were referred to see the following specialists: Gastroenterology [Please follow-up in 3 months] You should receive a call from central scheduling in the next few days if you do not receive a call within 3-5 business days please call [phone] to schedule your appointment.   4.   If you smoke or use other tobacco products, take steps to quit. Call [phone] for more information or to set up an appointment with  [facility]. The [place] Tobacco Quit Line is a free resource for people who don’t have insurance, receive Medicaid, pregnant women, or members of the [place] [facility]. Call 6-171-QUIT-NOW or 1-294.457.6765.    Please come back to see us in: For your yearly visit in 6 months.   ------  If you have any problems or questions, please contact the clinic at [phone] to leave a message. Our fax number is [phone]. If your issue cannot wait until the next business day, please go to urgent care or the emergency department.     I also strongly urge all of my patients to register for [facility] by going to: [email or web address]  (The  staff can also send you a text/email link to register when you check out).    No shows: It is understandable if you are unable to make it to a visit, but please cancel your appointment instead of not showing up. This helps to give other patients access to primary care and keeps wait times low.        [facility]   [phone]

## 2024-12-30 NOTE — PROGRESS NOTES
Primary Care Internal Medicine Clinic Note     Sommer Matute is a 65 yo female with a pmhx of hypertension, HLD, CAD (on ASA 81), migraine headaches, mild persistent asthma, renal cyst, and GERD presenting for follow-up.     Interval History:  - 11/22/24: EGD performed showing Miner's esophagus and erosive gastritis.   - 08/01/24: Last PCP visit; continued w/ medications for chronic issues. Repeat lipid panel w/ mildly elevated LDL. A1C wnl.     Active Concerns:  - Needs omeprazole refill. States that she has had her baseline reflux symptoms since running out of her omeprazole before Christmas. She states that on Christmas she 'cheated' on her diet and ate acidic foods and foods that typically bother  her stomach. She describes her pain as related to food consumption [acidic foods or that typically bother her stomach]. It is not related exertion, it does not radiate, it is not associated with dysphagia or odynophagia. She has no shortness of breath, nausea, vomiting, or abdominal pain. She states she has been taking her famotidine, but only takes her sucralfate intermittently.           Objective   Visit Vitals  OB Status Postmenopausal   Smoking Status Former       Physical exam:  Gen: well-appearing, NAD  Head and neck: NCAT, neck supple without LAD  HEENT: MMM, normal nose without congestion  CV: RRR no mrg  Pulm: CTAB, no wheezing or crackles, normal WOB on RA  Abd: soft, non-tender, non-distended  Ext: WWP, no LE edema  Neuro: alert and oriented x3, normal tone, face symmetric, moves all extremities spontaneously      Medications:  Current Outpatient Medications   Medication Instructions    albuterol 90 mcg/actuation inhaler 1-2 puffs, Every 6 hours PRN    ascorbic acid (VITAMIN C) 500 mg, oral, Daily    aspirin 81 mg, oral, Daily    atorvastatin (LIPITOR) 80 mg, oral, Nightly    carvedilol (Coreg) 25 mg tablet Take 1 tablet (25 mg) by mouth 2 times a day with meals.    cetirizine (ZYRTEC) 10 mg,  oral, Daily    chlorthalidone (HYGROTON) 25 mg, oral, Daily    diphenhydrAMINE (SOMINEX) 50 mg, oral, Nightly PRN    famotidine (PEPCID) 20 mg, oral, 2 times daily    fexofenadine-pseudoephedrine (Allegra-D 24) 180-240 mg 24 hr tablet 1 tablet, Daily PRN    fluticasone (Flonase) 50 mcg/actuation nasal spray 1 spray, Each Nostril, Daily, Shake gently. Before first use, prime pump. After use, clean tip and replace cap.    metoclopramide (REGLAN) 10 mg, oral, Every 6 hours    omeprazole (PRILOSEC) 40 mg, oral, Daily, Do not crush or chew.    sucralfate (CARAFATE) 1 g, oral, 4 times daily before meals and nightly        Allergies:  Allergies   Allergen Reactions    Sulfa (Sulfonamide Antibiotics) Hives and Unknown    Latex Rash    Triethanolamine Oleate Rash           Assessment/Plan    64 y.o. female with a pmhx of hypertension, HLD, CAD (on ASA 81), migraine headaches, mild persistent asthma, renal cyst, and GERD presenting for a medication refill. She has run out of her omeprazole and is endorsing symptoms of reflux. Recent EGD showing gastritis and rodriguez's esophagus.     # GERD  # Rodriguez's Esophagus  # Gastritis  - Re-order omeprazole 40 mg every day  - Re-order Pepcid and sucralfate  - Fu with GI in 3 months to evaluate symptoms    # HTN  - Refilling coreg and chlorthalidone    #Health Maintenance  Imaging Screening  - Osteoporosis/DEXA screening: Referral placed   Cancer Screening  - Cervical Cancer Screening: last pap smear/HPV testing in 2023 and wnl  - Mammography: Done 2024, recommend yearly   - Colorectal Cancer Screening: Done in 2023, repeat in 7 years per report (2030)    RTC in 6 months for yearly well check.  Plan discussed with Dr. Sherri Ricardo.      Zeus Chu MD  Med-Peds Resident, PGY-1  [facility]  P [phone]  F [phone]

## 2025-01-07 ENCOUNTER — PHARMACY VISIT (OUTPATIENT)
Dept: PHARMACY | Facility: CLINIC | Age: 65
End: 2025-01-07
Payer: COMMERCIAL

## 2025-01-13 ENCOUNTER — OFFICE VISIT (OUTPATIENT)
Dept: PRIMARY CARE | Facility: HOSPITAL | Age: 65
End: 2025-01-13
Payer: COMMERCIAL

## 2025-01-13 VITALS
WEIGHT: 184.7 LBS | DIASTOLIC BLOOD PRESSURE: 88 MMHG | HEIGHT: 63 IN | HEART RATE: 88 BPM | TEMPERATURE: 97.2 F | OXYGEN SATURATION: 100 % | SYSTOLIC BLOOD PRESSURE: 161 MMHG | BODY MASS INDEX: 32.73 KG/M2

## 2025-01-13 DIAGNOSIS — M12.811 ROTATOR CUFF ARTHROPATHY, RIGHT: Primary | ICD-10-CM

## 2025-01-13 DIAGNOSIS — E27.9 ADRENAL NODULE: ICD-10-CM

## 2025-01-13 DIAGNOSIS — Z78.0 POSTMENOPAUSAL: ICD-10-CM

## 2025-01-13 PROCEDURE — 1125F AMNT PAIN NOTED PAIN PRSNT: CPT

## 2025-01-13 PROCEDURE — 99213 OFFICE O/P EST LOW 20 MIN: CPT | Mod: GE

## 2025-01-13 PROCEDURE — 3079F DIAST BP 80-89 MM HG: CPT

## 2025-01-13 PROCEDURE — 1036F TOBACCO NON-USER: CPT

## 2025-01-13 PROCEDURE — 99213 OFFICE O/P EST LOW 20 MIN: CPT

## 2025-01-13 PROCEDURE — 1157F ADVNC CARE PLAN IN RCRD: CPT

## 2025-01-13 PROCEDURE — 3077F SYST BP >= 140 MM HG: CPT

## 2025-01-13 PROCEDURE — 1159F MED LIST DOCD IN RCRD: CPT

## 2025-01-13 PROCEDURE — 3008F BODY MASS INDEX DOCD: CPT

## 2025-01-13 ASSESSMENT — PATIENT HEALTH QUESTIONNAIRE - PHQ9
2. FEELING DOWN, DEPRESSED OR HOPELESS: NOT AT ALL
1. LITTLE INTEREST OR PLEASURE IN DOING THINGS: NOT AT ALL
SUM OF ALL RESPONSES TO PHQ9 QUESTIONS 1 AND 2: 0

## 2025-01-13 ASSESSMENT — ENCOUNTER SYMPTOMS
LOSS OF SENSATION IN FEET: 0
DEPRESSION: 0
OCCASIONAL FEELINGS OF UNSTEADINESS: 0

## 2025-01-13 ASSESSMENT — PAIN SCALES - GENERAL: PAINLEVEL_OUTOF10: 7

## 2025-01-13 NOTE — PROGRESS NOTES
"Juju Adams is a 63 y.o. female with PMH of HTN, HLD, CAD, mild intermittent asthma, migraine headaches, renal cyst who presents for FMLA paperwork. She is undergoing rotator cuff repair and requires this paperwork to be filled out.      /88 (BP Location: Left arm, Patient Position: Sitting, BP Cuff Size: Adult)   Pulse 88   Temp 36.2 °C (97.2 °F) (Temporal)   Ht 1.6 m (5' 3\")   Wt 83.8 kg (184 lb 11.2 oz)   SpO2 100%   BMI 32.72 kg/m²     Physical Exam  GEN:  A&Ox3, no acute distress, appears comfortable.  Conversational and appropriate.  No confusion or gross mental status changes.  EYES: EOMI, non-injected sclera.  ENT: Moist mucous membranes, no apparent injuries or lesions.  CARDIO: Normal rate   PULM: breathing ambient air  NEURO: Cranial nerves II-XII grossly intact.   PSYCH: Appropriate mood and behavior, converses and responds appropriately during exam    Assessment and Plan:  Juju Adams is a 63 y.o. female with PMH of HTN, HLD, CAD, mild intermittent asthma, migraine headaches, renal cyst who presents for FMLA paperwork. She is undergoing rotator cuff repair and requires this paperwork to be filled out.    #FMLA paperwork  - filled out, faxed, and place in chart    For next visit:  - due for lipids, CMP; consider workup for adrenal incidentaloma; provide K supplementation.     THE FOLLOWING NOT DISCUSSED TODAY:     #GERD  - C/w omeprazole 20mg BID, Carafate, added famotidine,   - Has GI follow-up next month  - counseled on minimizing acidic food, eating close to bedtime     #Chest pain  #CAD  - Occasional substernal chest pains 1-2 times/year for past 2 years, relieved by nitroglycerine  - [place] 3/2013: mild diffuse coronary disease, normal LV pressures  - Stress echo (3/2021): resting EF 60-65%, stress EF > 75%, normal stress echo  - Last EKG 2021: NSR, no abnormalities  - CTA 2/21/24 with mild-moderate RCA and LAD stenosis and CT score of 394  -Follows with cardiology, last " seen 3/2024, pain likely noncardiac  Plan:  - On atorva 40/aspirin 81     #HLD  -Last Lipid panel (9/2023): TChol 241, HDL 67.6, ,   -ASCVD >20%  Plan:  -C/w atorva 40mg  -repeat lipid panel today     #HTN  -121/64 in clinic today  Plan:  -instructed to maintain daily BP log  - c/w coreg 25mg BID, chlorthalidone 25 daily      #Migraines  -Significantly improved, has 1 migraine every 1-2 months  Plan:  -Continue Sumatriptan 25 mg PRN        # Health maintenance  ? Last HbA1c: 5.3 in 9/23  ? Infectious: Hep C, HIV Not addressed  ? Vaccinations: Not addressed  ? Colonoscopy: colonoscopy in 11/23, repeat in 7 years  ? Pap testing last done 11/23 normal, no longer needed, mammogram has referral for mammo  ? Low-dose CT chest: Not indicated  ? Bone density: Revisit at 64  ? AAA screening: Not indicated

## 2025-01-17 ENCOUNTER — HOSPITAL ENCOUNTER (OUTPATIENT)
Dept: RADIOLOGY | Facility: HOSPITAL | Age: 65
Discharge: HOME | End: 2025-01-17
Payer: COMMERCIAL

## 2025-01-17 DIAGNOSIS — M75.111 INCOMPLETE ROTATOR CUFF TEAR OR RUPTURE OF RIGHT SHOULDER, NOT SPECIFIED AS TRAUMATIC: ICD-10-CM

## 2025-01-17 DIAGNOSIS — M19.011 PRIMARY OSTEOARTHRITIS, RIGHT SHOULDER: ICD-10-CM

## 2025-01-17 PROCEDURE — 73200 CT UPPER EXTREMITY W/O DYE: CPT | Mod: RT

## 2025-01-22 ENCOUNTER — PRE-ADMISSION TESTING (OUTPATIENT)
Dept: PREADMISSION TESTING | Facility: HOSPITAL | Age: 65
End: 2025-01-22
Payer: COMMERCIAL

## 2025-01-22 ENCOUNTER — LAB (OUTPATIENT)
Dept: LAB | Facility: LAB | Age: 65
End: 2025-01-22
Payer: COMMERCIAL

## 2025-01-22 VITALS
DIASTOLIC BLOOD PRESSURE: 74 MMHG | RESPIRATION RATE: 16 BRPM | TEMPERATURE: 97.2 F | SYSTOLIC BLOOD PRESSURE: 127 MMHG | BODY MASS INDEX: 32.27 KG/M2 | WEIGHT: 182.1 LBS | HEIGHT: 63 IN | OXYGEN SATURATION: 99 % | HEART RATE: 76 BPM

## 2025-01-22 DIAGNOSIS — M19.011 ARTHRITIS OF RIGHT SHOULDER REGION: ICD-10-CM

## 2025-01-22 DIAGNOSIS — Z01.818 PRE-OP EXAM: Primary | ICD-10-CM

## 2025-01-22 DIAGNOSIS — Z01.818 PRE-OP TESTING: ICD-10-CM

## 2025-01-22 DIAGNOSIS — Z01.818 PRE-OP EXAM: ICD-10-CM

## 2025-01-22 DIAGNOSIS — M75.111 PARTIAL NONTRAUMATIC TEAR OF ROTATOR CUFF, RIGHT: ICD-10-CM

## 2025-01-22 LAB
ANION GAP SERPL CALC-SCNC: 9 MMOL/L (ref 10–20)
ATRIAL RATE: 67 BPM
BUN SERPL-MCNC: 14 MG/DL (ref 6–23)
CALCIUM SERPL-MCNC: 9.8 MG/DL (ref 8.6–10.3)
CHLORIDE SERPL-SCNC: 102 MMOL/L (ref 98–107)
CO2 SERPL-SCNC: 33 MMOL/L (ref 21–32)
CREAT SERPL-MCNC: 0.78 MG/DL (ref 0.5–1.05)
EGFRCR SERPLBLD CKD-EPI 2021: 84 ML/MIN/1.73M*2
ERYTHROCYTE [DISTWIDTH] IN BLOOD BY AUTOMATED COUNT: 12.7 % (ref 11.5–14.5)
EST. AVERAGE GLUCOSE BLD GHB EST-MCNC: 108 MG/DL
GLUCOSE SERPL-MCNC: 98 MG/DL (ref 74–99)
HBA1C MFR BLD: 5.4 %
HCT VFR BLD AUTO: 42.7 % (ref 36–46)
HGB BLD-MCNC: 13.7 G/DL (ref 12–16)
MCH RBC QN AUTO: 30.6 PG (ref 26–34)
MCHC RBC AUTO-ENTMCNC: 32.1 G/DL (ref 32–36)
MCV RBC AUTO: 95 FL (ref 80–100)
NRBC BLD-RTO: 0 /100 WBCS (ref 0–0)
P AXIS: 72 DEGREES
P OFFSET: 204 MS
P ONSET: 150 MS
PLATELET # BLD AUTO: 269 X10*3/UL (ref 150–450)
POTASSIUM SERPL-SCNC: 4.4 MMOL/L (ref 3.5–5.3)
PR INTERVAL: 150 MS
Q ONSET: 225 MS
QRS COUNT: 11 BEATS
QRS DURATION: 80 MS
QT INTERVAL: 408 MS
QTC CALCULATION(BAZETT): 431 MS
QTC FREDERICIA: 423 MS
R AXIS: -3 DEGREES
RBC # BLD AUTO: 4.48 X10*6/UL (ref 4–5.2)
SODIUM SERPL-SCNC: 140 MMOL/L (ref 136–145)
T AXIS: 46 DEGREES
T OFFSET: 429 MS
VENTRICULAR RATE: 67 BPM
WBC # BLD AUTO: 5.4 X10*3/UL (ref 4.4–11.3)

## 2025-01-22 PROCEDURE — 87081 CULTURE SCREEN ONLY: CPT | Mod: STJLAB

## 2025-01-22 PROCEDURE — 83036 HEMOGLOBIN GLYCOSYLATED A1C: CPT

## 2025-01-22 PROCEDURE — 93005 ELECTROCARDIOGRAM TRACING: CPT

## 2025-01-22 PROCEDURE — 99202 OFFICE O/P NEW SF 15 MIN: CPT | Performed by: NURSE PRACTITIONER

## 2025-01-22 RX ORDER — SUMATRIPTAN SUCCINATE 25 MG/1
25 TABLET ORAL DAILY PRN
COMMUNITY

## 2025-01-22 RX ORDER — TOPIRAMATE 100 MG/1
100 TABLET, FILM COATED ORAL DAILY
COMMUNITY

## 2025-01-22 RX ORDER — VIT C/E/ZN/COPPR/LUTEIN/ZEAXAN 250MG-90MG
25 CAPSULE ORAL DAILY
COMMUNITY

## 2025-01-22 RX ORDER — GLUCOSAMINE/CHONDRO SU A 500-400 MG
1 TABLET ORAL 3 TIMES DAILY
COMMUNITY
End: 2025-01-30 | Stop reason: HOSPADM

## 2025-01-22 RX ORDER — ARGIN/GLUT/CAHMB/COLLAG/MV-MIN 7G-7G-1.5G
2 POWDER IN PACKET (EA) ORAL DAILY
COMMUNITY

## 2025-01-22 RX ORDER — CHLORHEXIDINE GLUCONATE ORAL RINSE 1.2 MG/ML
SOLUTION DENTAL
Qty: 473 ML | Refills: 0 | Status: SHIPPED | OUTPATIENT
Start: 2025-01-22

## 2025-01-22 RX ORDER — ZINC GLUCONATE 50 MG
50 TABLET ORAL DAILY
COMMUNITY

## 2025-01-22 RX ORDER — BACLOFEN 20 MG
1 TABLET ORAL DAILY
COMMUNITY

## 2025-01-22 RX ORDER — MAGNESIUM HYDROXIDE 400 MG/5ML
1 SUSPENSION, ORAL (FINAL DOSE FORM) ORAL DAILY
COMMUNITY

## 2025-01-22 ASSESSMENT — ACTIVITIES OF DAILY LIVING (ADL): ADL_SCORE: 0

## 2025-01-22 ASSESSMENT — DUKE ACTIVITY SCORE INDEX (DASI)
TOTAL_SCORE: 30.2
CAN YOU TAKE CARE OF YOURSELF (EAT, DRESS, BATHE, OR USE TOILET): YES
CAN YOU PARTICIPATE IN STRENOUS SPORTS LIKE SWIMMING, SINGLES TENNIS, FOOTBALL, BASKETBALL, OR SKIING: NO
CAN YOU WALK A BLOCK OR TWO ON LEVEL GROUND: YES
CAN YOU DO HEAVY WORK AROUND THE HOUSE LIKE SCRUBBING FLOORS OR LIFTING AND MOVING HEAVY FURNITURE: NO
CAN YOU HAVE SEXUAL RELATIONS: YES
CAN YOU DO YARD WORK LIKE RAKING LEAVES, WEEDING OR PUSHING A MOWER: NO
CAN YOU DO LIGHT WORK AROUND THE HOUSE LIKE DUSTING OR WASHING DISHES: YES
CAN YOU CLIMB A FLIGHT OF STAIRS OR WALK UP A HILL: YES
CAN YOU WALK INDOORS, SUCH AS AROUND YOUR HOUSE: YES
CAN YOU PARTICIPATE IN MODERATE RECREATIONAL ACTIVITIES LIKE GOLF, BOWLING, DANCING, DOUBLES TENNIS OR THROWING A BASEBALL OR FOOTBALL: YES
CAN YOU RUN A SHORT DISTANCE: NO
DASI METS SCORE: 6.5
CAN YOU DO MODERATE WORK AROUND THE HOUSE LIKE VACUUMING, SWEEPING FLOORS OR CARRYING GROCERIES: YES

## 2025-01-22 ASSESSMENT — LIFESTYLE VARIABLES: SMOKING_STATUS: NONSMOKER

## 2025-01-22 ASSESSMENT — PAIN SCALES - GENERAL: PAINLEVEL_OUTOF10: 0 - NO PAIN

## 2025-01-22 ASSESSMENT — PAIN - FUNCTIONAL ASSESSMENT: PAIN_FUNCTIONAL_ASSESSMENT: 0-10

## 2025-01-22 NOTE — H&P (VIEW-ONLY)
CPM/PAT Evaluation       Name: Juju Adams (Juju Adams)  /Age: 1960/64 y.o.     In-Person       Chief Complaint: pre op appointment for upcoming right shoulder surgery    HPI    KAVON is a 65 yo female who injured her right shoulder back in  after a fall.  Since then she has been having right shoulder discomforts with worsening in symptoms causing limited range of motion.  Recent imaging shows complete tear of right rotator cuff and OA.  Treatment options discussed with orthopedic surgeon and subsequently she is scheduled for total right shoulder arthroplasty. Presents to CPM today for perioperative risk stratification and optimization.  Denies any recent steroid injections.  Skin is intact to surgical limb. Otherwise denies any recent illness, fever/chills, chest pains or shortness of breath.     Past Medical History:   Diagnosis Date    Arthritis     Asthma     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    Coronary artery disease     GERD (gastroesophageal reflux disease)     Hyperlipidemia     Hypertension     Migraines     Other specified cough 2019    Post-viral cough syndrome    Personal history of other diseases of the respiratory system 2017    History of acute bronchitis with bronchospasm    Personal history of other diseases of the respiratory system 2018    History of acute sinusitis    Personal history of other drug therapy 2016    History of influenza vaccination    Personal history of other infectious and parasitic diseases 2015    History of herpes zoster    Vision loss        Past Surgical History:   Procedure Laterality Date    CARDIAC CATHETERIZATION      COLONOSCOPY      TOTAL HIP ARTHROPLASTY Bilateral 2014    Total Hip Replacement    TOTAL KNEE ARTHROPLASTY Left     TUBAL LIGATION  2018    Tubal Ligation    UPPER GASTROINTESTINAL ENDOSCOPY         Patient Sexual activity questions deferred to the  physician.    Family History   Problem Relation Name Age of Onset    Hypertension Mother      Heart attack Mother      Hypertension Father      Heart attack Father      Stroke Father      Diabetes Sister      Hypertension Sister      Seizures Sister         Allergies   Allergen Reactions    Sulfa (Sulfonamide Antibiotics) Hives    Latex Rash    Triethanolamine Oleate Rash       Prior to Admission medications    Medication Sig Start Date End Date Taking? Authorizing Provider   albuterol 90 mcg/actuation inhaler Inhale 1-2 puffs every 6 hours if needed for wheezing or shortness of breath.  Patient not taking: Reported on 11/22/2024    Historical Provider, MD   ascorbic acid (Vitamin C) 500 mg tablet Take 1 tablet (500 mg) by mouth once daily. 12/27/24 3/27/25  Perico Echeverria MD   aspirin 81 mg EC tablet Take 1 tablet (81 mg) by mouth once daily. 12/27/24 3/27/25  Perico Echeverria MD   atorvastatin (Lipitor) 40 mg tablet Take 2 tablets (80 mg) by mouth once daily at bedtime. 12/27/24 3/27/25  Perico Echeverria MD   carvedilol (Coreg) 25 mg tablet Take 1 tablet (25 mg) by mouth 2 times a day with meals. 12/30/24 3/30/25  Zeus Chu MD   cetirizine (ZyrTEC) 10 mg tablet Take 1 tablet (10 mg) by mouth once daily.  Patient not taking: Reported on 11/22/2024 10/4/24 11/3/24  Renata Vazquez MD   chlorthalidone (Hygroton) 25 mg tablet Take 1 tablet (25 mg) by mouth once daily. 12/30/24   Zeus Chu MD   diphenhydrAMINE (Sominex) 25 mg tablet Take 2 tablets (50 mg) by mouth as needed at bedtime for sleep or allergies (Sinus headache) for up to 10 days.  Patient not taking: Reported on 11/22/2024 10/4/24 10/14/24  Renata Vazquez MD   famotidine (Pepcid) 20 mg tablet Take 1 tablet (20 mg) by mouth 2 times a day. 12/30/24 6/28/25  Zeus Chu MD   fexofenadine-pseudoephedrine (Allegra-D 24) 180-240 mg 24 hr tablet Take 1 tablet by mouth once daily as needed for allergies. Do not crush, chew,  or split.    Historical Provider, MD   fluticasone (Flonase) 50 mcg/actuation nasal spray Administer 1 spray into each nostril once daily. Shake gently. Before first use, prime pump. After use, clean tip and replace cap.  Patient not taking: Reported on 11/22/2024 10/4/24 11/3/24  Renata Vazquez MD   metoclopramide (Reglan) 10 mg tablet Take 1 tablet (10 mg) by mouth every 6 hours for 7 days.  Patient not taking: Reported on 11/22/2024 10/4/24 10/11/24  Renata Vazquez MD   omeprazole (PriLOSEC) 40 mg DR capsule Take 1 capsule (40 mg) by mouth once daily. Do not crush or chew. 12/30/24 1/29/25  Zeus Chu MD   sucralfate (Carafate) 1 gram tablet Take 1 tablet (1 g) by mouth 4 times a day before meals. 12/30/24   Zeus Chu MD        PAT ROS   Constitutional: Negative for fever, chills, or sweats   ENMT: Negative for nasal discharge, congestion, ear pain, mouth pain, throat pain + eyeglasses  Respiratory: Negative for cough, wheezing, shortness of breath   Cardiac: Negative for chest pain, dyspnea on exertion, palpitations   Gastrointestinal: Negative for nausea, vomiting, diarrhea, constipation, abdominal pain  Genitourinary: Negative for dysuria, flank pain, frequency, hematuria     Musculoskeletal: Positive for decreased ROM, pain, swelling, weakness in right shoulder    Neurological: Negative for dizziness, confusion, headache  Psychiatric: Negative for mood changes   Skin: Negative for itching, rash, ulcer    Hematologic/Lymph: Negative for bruising, easy bleeding  Allergic/Immunologic: Negative itching, sneezing, swelling      Physical Exam  Constitutional:       Appearance: Normal appearance.   HENT:      Head: Normocephalic.      Mouth/Throat:      Mouth: Mucous membranes are moist.   Eyes:      Extraocular Movements: Extraocular movements intact.   Cardiovascular:      Rate and Rhythm: Normal rate and regular rhythm.   Pulmonary:      Effort: Pulmonary effort is normal.       "Breath sounds: Normal breath sounds.   Abdominal:      General: Abdomen is flat.      Palpations: Abdomen is soft.   Musculoskeletal:      Right shoulder: Decreased range of motion.      Cervical back: Normal range of motion.   Skin:     General: Skin is warm and dry.   Neurological:      General: No focal deficit present.      Mental Status: She is alert.   Psychiatric:         Mood and Affect: Mood normal.        PAT AIRWAY:   Airway:     Mallampati::  II    Neck ROM::  Full   partials      Testing/Diagnostic:     Echo Feb 2024  CONCLUSIONS:   1. Left ventricular systolic function is normal with a 60% estimated ejection fraction.   2. Poorly visualized anatomical structures due to suboptimal image quality.     Lab Results   Component Value Date    WBC 6.5 10/04/2024    HGB 13.3 10/04/2024    HCT 39.5 10/04/2024    MCV 96 10/04/2024     10/04/2024     Lab Results   Component Value Date    GLUCOSE 98 10/04/2024    CALCIUM 9.4 10/04/2024     10/04/2024    K 3.3 (L) 10/04/2024    CO2 28 10/04/2024     10/04/2024    BUN 20 10/04/2024    CREATININE 0.76 10/04/2024     Hemoglobin A1C   Date Value Ref Range Status   08/01/2024 5.5 see below % Final      Patient Specialist/PCP:   [facility]- providers at [facility]  Cards: Dr. Cheng    Visit Vitals  /74   Pulse 76   Temp 36.2 °C (97.2 °F) (Temporal)   Resp 16   Ht 1.6 m (5' 3\")   Wt 82.6 kg (182 lb 1.6 oz)   SpO2 99%   BMI 32.26 kg/m²   OB Status Postmenopausal   Smoking Status Former   BSA 1.92 m²       DASI Risk Score      Flowsheet Row Pre-Admission Testing from 1/22/2025 in [facility]   Can you take care of yourself (eat, dress, bathe, or use toilet)?  2.75 filed at 01/22/2025 0927   Can you walk indoors, such as around your house? 1.75 filed at 01/22/2025 0927   Can you walk a block or two on level ground?  2.75 filed at 01/22/2025 0927   Can you climb a flight of stairs or walk up a hill? 5.5 filed at 01/22/2025 0927 "   Can you run a short distance? 0 filed at 01/22/2025 0927   Can you do light work around the house like dusting or washing dishes? 2.7 filed at 01/22/2025 0927   Can you do moderate work around the house like vacuuming, sweeping floors or carrying groceries? 3.5 filed at 01/22/2025 0927   Can you do heavy work around the house like scrubbing floors or lifting and moving heavy furniture?  0 filed at 01/22/2025 0927   Can you do yard work like raking leaves, weeding or pushing a mower? 0 filed at 01/22/2025 0927   Can you have sexual relations? 5.25 filed at 01/22/2025 0927   Can you participate in moderate recreational activities like golf, bowling, dancing, doubles tennis or throwing a baseball or football? 6 filed at 01/22/2025 0927   Can you participate in strenous sports like swimming, singles tennis, football, basketball, or skiing? 0 filed at 01/22/2025 0927   DASI SCORE 30.2 filed at 01/22/2025 0927   METS Score (Will be calculated only when all the questions are answered) 6.5 filed at 01/22/2025 0927          Caprini DVT Assessment      Flowsheet Row Pre-Admission Testing from 1/22/2025 in [facility] ED to Hosp-Admission (Discharged) from 7/19/2024 in [facility] with Sharifa Trejo DO and Jean-Paul Bolanos MD   DVT Score (IF A SCORE IS NOT CALCULATING, MUST SELECT A BMI TO COMPLETE) 8 filed at 01/22/2025 0922 3 filed at 07/19/2024 1738   Surgical Factors Major surgery planned, lasting 2-3 hours filed at 01/22/2025 0922 --   BMI (BMI MUST BE CHOSEN) 31-40 (Obesity) filed at 01/22/2025 0922 30 or less filed at 07/19/2024 1738   RETIRED: Age -- 60-75 years filed at 07/19/2024 1738          Modified Frailty Index      Flowsheet Row Pre-Admission Testing from 1/22/2025 in [facility]   Non-independent functional status (problems with dressing, bathing, personal grooming, or cooking) 0 filed at 01/22/2025 0928   History of diabetes mellitus  0  filed at 01/22/2025 0928   History of COPD 0 filed at 01/22/2025 0928   History of CHF No filed at 01/22/2025 0928   History of MI 0 filed at 01/22/2025 0928   History of Percutaneous Coronary Intervention, Cardiac Surgery, or Angina No filed at 01/22/2025 0928   Hypertension requiring the use of medication  0.0909 filed at 01/22/2025 0928   Peripheral vascular disease 0 filed at 01/22/2025 0928   Impaired sensorium (cognitive impairement or loss, clouding, or delirium) 0 filed at 01/22/2025 0928   TIA or CVA withouy residual deficit 0 filed at 01/22/2025 0928   Cerebrovascular accident with deficit 0 filed at 01/22/2025 0928   Modified Frailty Index Calculator .0909 filed at 01/22/2025 0928          CHADS2 Stroke Risk  Current as of 6 minutes ago        N/A 3 to 100%: High Risk   2 to < 3%: Medium Risk   0 to < 2%: Low Risk     Last Change: N/A          This score determines the patient's risk of having a stroke if the patient has atrial fibrillation.        This score is not applicable to this patient. Components are not calculated.          Revised Cardiac Risk Index      Flowsheet Row Pre-Admission Testing from 1/22/2025 in [facility] [place] [place]   High-Risk Surgery (Intraperitoneal, Intrathoracic,Suprainguinal vascular) 0 filed at 01/22/2025 0927   History of ischemic heart disease (History of MI, History of positive exercuse test, Current chest paint considered due to myocardial ischemia, Use of nitrate therapy, ECG with pathological Q Waves) 0 filed at 01/22/2025 0927   History of congestive heart failure (pulmonary edemia, bilateral rales or S3 gallop, Paroxysmal nocturnal dyspnea, CXR showing pulmonary vascular redistribution) 0 filed at 01/22/2025 0927   History of cerebrovascular disease (Prior TIA or stroke) 0 filed at 01/22/2025 0927   Pre-operative insulin treatment 0 filed at 01/22/2025 0927   Pre-operative creatinine>2 mg/dl 0 filed at 01/22/2025 0927   Revised Cardiac Risk Calculator 0 filed at  2025          Apfel Simplified Score      Flowsheet Row Pre-Admission Testing from 2025 in [facility]   Smoking status 1 filed at 2025   History of motion sickness or PONV  0 filed at 2025   Use of postoperative opioids 1 filed at 2025   Gender - Female 1=Yes filed at 2025   Apfel Simplified Score Calculator 3 filed at 2025          Risk Analysis Index Results This Encounter         2025             Do you live in a place other than your own home?: 0    When did you begin living in the place you are currently residing?: Greater than one year ago    Any kidney failure, kidney not working well, or seeing a kidney doctor (nephrologist)? If yes, was this for kidney stones or another problem?: 1 Kidney stones    Any history of chronic (long-term) congestive heart failure (CHF)?: 0 No    Any shortness of breath when resting?: 0 No    In the past five years, have you been diagnosed with or treated for cancer?: No    During the last 3 months has it become difficult for you to remember things or organize your thoughts?: 0 No    Have you lost weight of 10 pounds or more in the past 3 months without trying?: 0 No    Do you have any loss of appetitie?: 0 No    Getting Around (Mobility): 0 Can get around without help    Eatin Can plan and prepare own meals    Toiletin Can use toilet without any help    Personal Hygiene (Bathing, Hand Washing, Changing Clothes): 0 Can shower or bathe without any help    DEL ANGEL Cancer History: Patient does not indicate history of cancer    Total Risk Analysis Index Score Without Cancer: 21    Total Risk Analysis Index Score: 21          Stop Bang Score      Flowsheet Row Pre-Admission Testing from 2025 in [facility]   Do you snore loudly? 0 filed at 2025   Do you often feel tired or fatigued after your sleep? 0 filed at 2025   Has anyone ever observed  you stop breathing in your sleep? 0 filed at 01/22/2025 0925   Do you have or are you being treated for high blood pressure? 1 filed at 01/22/2025 0925   Recent BMI (Calculated) 32.3 filed at 01/22/2025 0925   Is BMI greater than 35 kg/m2? 0=No filed at 01/22/2025 0925   Age older than 50 years old? 1=Yes filed at 01/22/2025 0925   Is your neck circumference greater than 17 inches (Male) or 16 inches (Female)? 0 filed at 01/22/2025 0925   Gender - Male 0=No filed at 01/22/2025 0925   STOP-BANG Total Score 2 filed at 01/22/2025 0925          Prodigy: High Risk  Total Score: 8              Prodigy Age Score           ARISCAT Score for Postoperative Pulmonary Complications      Flowsheet Row Pre-Admission Testing from 1/22/2025 in [facility]   Age Calculated Score 3 filed at 01/22/2025 0928   Preoperative SpO2 0 filed at 01/22/2025 0928   Respiratory infection in the last month Either upper or lower (i.e., URI, bronchitis, pneumonia), with fever and antibiotic treatment 0 filed at 01/22/2025 0928   Preoperative anemia (Hgb less than 10 g/dl) 0 filed at 01/22/2025 0928   Surgical incision  0 filed at 01/22/2025 0928   Duration of surgery  0 filed at 01/22/2025 0928   Emergency Procedure  0 filed at 01/22/2025 0928   ARISCAT Total Score  3 filed at 01/22/2025 0928          Shauna Perioperative Risk for Myocardial Infarction or Cardiac Arrest (MARIBELL)      Flowsheet Row Pre-Admission Testing from 1/22/2025 in [facility]   Calculated Age Score 1.3 filed at 01/22/2025 0928   Functional Status  0 filed at 01/22/2025 0928   ASA Class  -3.29 filed at 01/22/2025 0928   Creatinine 0 filed at 01/22/2025 0928   Type of Procedure  0.80 filed at 01/22/2025 0928   MARIBELL Total Score  -6.44 filed at 01/22/2025 0928   MARIBELL % 0.16 filed at 01/22/2025 0928            Assessment and Plan:     Pre-Op  64-year-old female scheduled for right RESURFACING ARTHROPLASTY, SHOULDER, TOTAL on 1/28/2025 with   Ana Lilia. Blood work and MRSA ordered- oral chlorahexidine prescribed. EKG shows NSR with v rate of 67 bpm. She has been instructed to notify surgeon if any new skin changes occur to surgical limb prior to surgery. Otherwise no further orders indicated.                                                      Cardiac  Follows with cardiology, last seen in 2024  Hypertension, taking coreg, chlorthalidone and baby aspirin  Hyperlipidemia, taking statin  CAD,mild           LVEF 60% from echo                                                                                                                                                             DASI Score: 30.2   MET Score: 6.5  RCRI  0 which is 3.9% 30 day risk of MACE (risk for cardiac death, nonfatal myocardial infarction, and nonfactal cardiac arrest)  MARIBELL score which indicates a  0.16 % risk of intraoperative or 30-day postoperative MACE    Pulmonary  -Asthma, mild and stable, has rescue inhaler for as needed use  Preoperative deep breathing educational handout provided to patient.  STOP BAN   points which is a low risk for moderate to severe CHAZ  ARISCAT:    3  points which is a low (1.6%) risk of in-hospital post-op pulmonary complications     Neuro  Migraines, compliant with Topamax and has Imitrex for as needed use when symptoms arise    Endocrine  BMI 32.26  Preoperative hemoglobin A1c ordered    GI  GERD, taking PPI  Apfel: 3 points 61% risk for post operative N/V    /Renal  Counseled on avoiding NSAIDs, adequate hydration    Musculoskeletal   Osteoarthritis    Hematologic  No hematological medical history, however due to high Caprini score of  8 patient is at HIGH risk for perioperative DVT, informative education handout provided    Skin check: Patient was instructed to make surgeon aware of any skin changes/concerns prior to surgery.     Anesthesia:  Patient denies any anesthesia complications.     See risk scores as previously documented.

## 2025-01-22 NOTE — CPM/PAT H&P
CPM/PAT Evaluation       Name: Juju Adams (Juju Admas)  /Age: 1960/64 y.o.     In-Person       Chief Complaint: pre op appointment for upcoming right shoulder surgery    HPI    KAVON is a 65 yo female who injured her right shoulder back in  after a fall.  Since then she has been having right shoulder discomforts with worsening in symptoms causing limited range of motion.  Recent imaging shows complete tear of right rotator cuff and OA.  Treatment options discussed with orthopedic surgeon and subsequently she is scheduled for total right shoulder arthroplasty. Presents to CPM today for perioperative risk stratification and optimization.  Denies any recent steroid injections.  Skin is intact to surgical limb. Otherwise denies any recent illness, fever/chills, chest pains or shortness of breath.     Past Medical History:   Diagnosis Date    Arthritis     Asthma     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    Coronary artery disease     GERD (gastroesophageal reflux disease)     Hyperlipidemia     Hypertension     Migraines     Other specified cough 2019    Post-viral cough syndrome    Personal history of other diseases of the respiratory system 2017    History of acute bronchitis with bronchospasm    Personal history of other diseases of the respiratory system 2018    History of acute sinusitis    Personal history of other drug therapy 2016    History of influenza vaccination    Personal history of other infectious and parasitic diseases 2015    History of herpes zoster    Vision loss        Past Surgical History:   Procedure Laterality Date    CARDIAC CATHETERIZATION      COLONOSCOPY      TOTAL HIP ARTHROPLASTY Bilateral 2014    Total Hip Replacement    TOTAL KNEE ARTHROPLASTY Left     TUBAL LIGATION  2018    Tubal Ligation    UPPER GASTROINTESTINAL ENDOSCOPY         Patient Sexual activity questions deferred to the  physician.    Family History   Problem Relation Name Age of Onset    Hypertension Mother      Heart attack Mother      Hypertension Father      Heart attack Father      Stroke Father      Diabetes Sister      Hypertension Sister      Seizures Sister         Allergies   Allergen Reactions    Sulfa (Sulfonamide Antibiotics) Hives    Latex Rash    Triethanolamine Oleate Rash       Prior to Admission medications    Medication Sig Start Date End Date Taking? Authorizing Provider   albuterol 90 mcg/actuation inhaler Inhale 1-2 puffs every 6 hours if needed for wheezing or shortness of breath.  Patient not taking: Reported on 11/22/2024    Historical Provider, MD   ascorbic acid (Vitamin C) 500 mg tablet Take 1 tablet (500 mg) by mouth once daily. 12/27/24 3/27/25  Perico Echeverria MD   aspirin 81 mg EC tablet Take 1 tablet (81 mg) by mouth once daily. 12/27/24 3/27/25  Perico Echeverria MD   atorvastatin (Lipitor) 40 mg tablet Take 2 tablets (80 mg) by mouth once daily at bedtime. 12/27/24 3/27/25  Perico Echeverria MD   carvedilol (Coreg) 25 mg tablet Take 1 tablet (25 mg) by mouth 2 times a day with meals. 12/30/24 3/30/25  Zeus Chu MD   cetirizine (ZyrTEC) 10 mg tablet Take 1 tablet (10 mg) by mouth once daily.  Patient not taking: Reported on 11/22/2024 10/4/24 11/3/24  Renata Vazquez MD   chlorthalidone (Hygroton) 25 mg tablet Take 1 tablet (25 mg) by mouth once daily. 12/30/24   Zeus Chu MD   diphenhydrAMINE (Sominex) 25 mg tablet Take 2 tablets (50 mg) by mouth as needed at bedtime for sleep or allergies (Sinus headache) for up to 10 days.  Patient not taking: Reported on 11/22/2024 10/4/24 10/14/24  Renata Vazquez MD   famotidine (Pepcid) 20 mg tablet Take 1 tablet (20 mg) by mouth 2 times a day. 12/30/24 6/28/25  Zeus Chu MD   fexofenadine-pseudoephedrine (Allegra-D 24) 180-240 mg 24 hr tablet Take 1 tablet by mouth once daily as needed for allergies. Do not crush, chew,  or split.    Historical Provider, MD   fluticasone (Flonase) 50 mcg/actuation nasal spray Administer 1 spray into each nostril once daily. Shake gently. Before first use, prime pump. After use, clean tip and replace cap.  Patient not taking: Reported on 11/22/2024 10/4/24 11/3/24  Renata Vazquez MD   metoclopramide (Reglan) 10 mg tablet Take 1 tablet (10 mg) by mouth every 6 hours for 7 days.  Patient not taking: Reported on 11/22/2024 10/4/24 10/11/24  Renata Vazquez MD   omeprazole (PriLOSEC) 40 mg DR capsule Take 1 capsule (40 mg) by mouth once daily. Do not crush or chew. 12/30/24 1/29/25  Zeus Chu MD   sucralfate (Carafate) 1 gram tablet Take 1 tablet (1 g) by mouth 4 times a day before meals. 12/30/24   Zeus Chu MD        PAT ROS   Constitutional: Negative for fever, chills, or sweats   ENMT: Negative for nasal discharge, congestion, ear pain, mouth pain, throat pain + eyeglasses  Respiratory: Negative for cough, wheezing, shortness of breath   Cardiac: Negative for chest pain, dyspnea on exertion, palpitations   Gastrointestinal: Negative for nausea, vomiting, diarrhea, constipation, abdominal pain  Genitourinary: Negative for dysuria, flank pain, frequency, hematuria     Musculoskeletal: Positive for decreased ROM, pain, swelling, weakness in right shoulder    Neurological: Negative for dizziness, confusion, headache  Psychiatric: Negative for mood changes   Skin: Negative for itching, rash, ulcer    Hematologic/Lymph: Negative for bruising, easy bleeding  Allergic/Immunologic: Negative itching, sneezing, swelling      Physical Exam  Constitutional:       Appearance: Normal appearance.   HENT:      Head: Normocephalic.      Mouth/Throat:      Mouth: Mucous membranes are moist.   Eyes:      Extraocular Movements: Extraocular movements intact.   Cardiovascular:      Rate and Rhythm: Normal rate and regular rhythm.   Pulmonary:      Effort: Pulmonary effort is normal.       "Breath sounds: Normal breath sounds.   Abdominal:      General: Abdomen is flat.      Palpations: Abdomen is soft.   Musculoskeletal:      Right shoulder: Decreased range of motion.      Cervical back: Normal range of motion.   Skin:     General: Skin is warm and dry.   Neurological:      General: No focal deficit present.      Mental Status: She is alert.   Psychiatric:         Mood and Affect: Mood normal.        PAT AIRWAY:   Airway:     Mallampati::  II    Neck ROM::  Full   partials      Testing/Diagnostic:     Echo Feb 2024  CONCLUSIONS:   1. Left ventricular systolic function is normal with a 60% estimated ejection fraction.   2. Poorly visualized anatomical structures due to suboptimal image quality.     Lab Results   Component Value Date    WBC 6.5 10/04/2024    HGB 13.3 10/04/2024    HCT 39.5 10/04/2024    MCV 96 10/04/2024     10/04/2024     Lab Results   Component Value Date    GLUCOSE 98 10/04/2024    CALCIUM 9.4 10/04/2024     10/04/2024    K 3.3 (L) 10/04/2024    CO2 28 10/04/2024     10/04/2024    BUN 20 10/04/2024    CREATININE 0.76 10/04/2024     Hemoglobin A1C   Date Value Ref Range Status   08/01/2024 5.5 see below % Final      Patient Specialist/PCP:   [facility]- providers at [facility]  Cards: Dr. Cheng    Visit Vitals  /74   Pulse 76   Temp 36.2 °C (97.2 °F) (Temporal)   Resp 16   Ht 1.6 m (5' 3\")   Wt 82.6 kg (182 lb 1.6 oz)   SpO2 99%   BMI 32.26 kg/m²   OB Status Postmenopausal   Smoking Status Former   BSA 1.92 m²       DASI Risk Score      Flowsheet Row Pre-Admission Testing from 1/22/2025 in [facility]   Can you take care of yourself (eat, dress, bathe, or use toilet)?  2.75 filed at 01/22/2025 0927   Can you walk indoors, such as around your house? 1.75 filed at 01/22/2025 0927   Can you walk a block or two on level ground?  2.75 filed at 01/22/2025 0927   Can you climb a flight of stairs or walk up a hill? 5.5 filed at 01/22/2025 0927 "   Can you run a short distance? 0 filed at 01/22/2025 0927   Can you do light work around the house like dusting or washing dishes? 2.7 filed at 01/22/2025 0927   Can you do moderate work around the house like vacuuming, sweeping floors or carrying groceries? 3.5 filed at 01/22/2025 0927   Can you do heavy work around the house like scrubbing floors or lifting and moving heavy furniture?  0 filed at 01/22/2025 0927   Can you do yard work like raking leaves, weeding or pushing a mower? 0 filed at 01/22/2025 0927   Can you have sexual relations? 5.25 filed at 01/22/2025 0927   Can you participate in moderate recreational activities like golf, bowling, dancing, doubles tennis or throwing a baseball or football? 6 filed at 01/22/2025 0927   Can you participate in strenous sports like swimming, singles tennis, football, basketball, or skiing? 0 filed at 01/22/2025 0927   DASI SCORE 30.2 filed at 01/22/2025 0927   METS Score (Will be calculated only when all the questions are answered) 6.5 filed at 01/22/2025 0927          Caprini DVT Assessment      Flowsheet Row Pre-Admission Testing from 1/22/2025 in [facility] ED to Hosp-Admission (Discharged) from 7/19/2024 in [facility] 3 with Sharifa Trejo DO and Jean-Paul Bolanos MD   DVT Score (IF A SCORE IS NOT CALCULATING, MUST SELECT A BMI TO COMPLETE) 8 filed at 01/22/2025 0922 3 filed at 07/19/2024 1738   Surgical Factors Major surgery planned, lasting 2-3 hours filed at 01/22/2025 0922 --   BMI (BMI MUST BE CHOSEN) 31-40 (Obesity) filed at 01/22/2025 0922 30 or less filed at 07/19/2024 1738   RETIRED: Age -- 60-75 years filed at 07/19/2024 1738          Modified Frailty Index      Flowsheet Row Pre-Admission Testing from 1/22/2025 in [facility]   Non-independent functional status (problems with dressing, bathing, personal grooming, or cooking) 0 filed at 01/22/2025 0928   History of diabetes mellitus  0  filed at 01/22/2025 0928   History of COPD 0 filed at 01/22/2025 0928   History of CHF No filed at 01/22/2025 0928   History of MI 0 filed at 01/22/2025 0928   History of Percutaneous Coronary Intervention, Cardiac Surgery, or Angina No filed at 01/22/2025 0928   Hypertension requiring the use of medication  0.0909 filed at 01/22/2025 0928   Peripheral vascular disease 0 filed at 01/22/2025 0928   Impaired sensorium (cognitive impairement or loss, clouding, or delirium) 0 filed at 01/22/2025 0928   TIA or CVA withouy residual deficit 0 filed at 01/22/2025 0928   Cerebrovascular accident with deficit 0 filed at 01/22/2025 0928   Modified Frailty Index Calculator .0909 filed at 01/22/2025 0928          CHADS2 Stroke Risk  Current as of 6 minutes ago        N/A 3 to 100%: High Risk   2 to < 3%: Medium Risk   0 to < 2%: Low Risk     Last Change: N/A          This score determines the patient's risk of having a stroke if the patient has atrial fibrillation.        This score is not applicable to this patient. Components are not calculated.          Revised Cardiac Risk Index      Flowsheet Row Pre-Admission Testing from 1/22/2025 in [facility]   High-Risk Surgery (Intraperitoneal, Intrathoracic,Suprainguinal vascular) 0 filed at 01/22/2025 0927   History of ischemic heart disease (History of MI, History of positive exercuse test, Current chest paint considered due to myocardial ischemia, Use of nitrate therapy, ECG with pathological Q Waves) 0 filed at 01/22/2025 0927   History of congestive heart failure (pulmonary edemia, bilateral rales or S3 gallop, Paroxysmal nocturnal dyspnea, CXR showing pulmonary vascular redistribution) 0 filed at 01/22/2025 0927   History of cerebrovascular disease (Prior TIA or stroke) 0 filed at 01/22/2025 0927   Pre-operative insulin treatment 0 filed at 01/22/2025 0927   Pre-operative creatinine>2 mg/dl 0 filed at 01/22/2025 0927   Revised Cardiac Risk Calculator 0 filed at  2025          Apfel Simplified Score      Flowsheet Row Pre-Admission Testing from 2025 in [facility] [place] [place]   Smoking status 1 filed at 2025   History of motion sickness or PONV  0 filed at 2025   Use of postoperative opioids 1 filed at 2025   Gender - Female 1=Yes filed at 2025   Apfel Simplified Score Calculator 3 filed at 2025          Risk Analysis Index Results This Encounter         2025             Do you live in a place other than your own home?: 0    When did you begin living in the place you are currently residing?: Greater than one year ago    Any kidney failure, kidney not working well, or seeing a kidney doctor (nephrologist)? If yes, was this for kidney stones or another problem?: 1 Kidney stones    Any history of chronic (long-term) congestive heart failure (CHF)?: 0 No    Any shortness of breath when resting?: 0 No    In the past five years, have you been diagnosed with or treated for cancer?: No    During the last 3 months has it become difficult for you to remember things or organize your thoughts?: 0 No    Have you lost weight of 10 pounds or more in the past 3 months without trying?: 0 No    Do you have any loss of appetitie?: 0 No    Getting Around (Mobility): 0 Can get around without help    Eatin Can plan and prepare own meals    Toiletin Can use toilet without any help    Personal Hygiene (Bathing, Hand Washing, Changing Clothes): 0 Can shower or bathe without any help    DEL ANGEL Cancer History: Patient does not indicate history of cancer    Total Risk Analysis Index Score Without Cancer: 21    Total Risk Analysis Index Score: 21          Stop Bang Score      Flowsheet Row Pre-Admission Testing from 2025 in [facility] [place], [place]   Do you snore loudly? 0 filed at 2025   Do you often feel tired or fatigued after your sleep? 0 filed at 2025   Has anyone ever observed  you stop breathing in your sleep? 0 filed at 01/22/2025 0925   Do you have or are you being treated for high blood pressure? 1 filed at 01/22/2025 0925   Recent BMI (Calculated) 32.3 filed at 01/22/2025 0925   Is BMI greater than 35 kg/m2? 0=No filed at 01/22/2025 0925   Age older than 50 years old? 1=Yes filed at 01/22/2025 0925   Is your neck circumference greater than 17 inches (Male) or 16 inches (Female)? 0 filed at 01/22/2025 0925   Gender - Male 0=No filed at 01/22/2025 0925   STOP-BANG Total Score 2 filed at 01/22/2025 0925          Prodigy: High Risk  Total Score: 8              Prodigy Age Score           ARISCAT Score for Postoperative Pulmonary Complications      Flowsheet Row Pre-Admission Testing from 1/22/2025 in [facility]   Age Calculated Score 3 filed at 01/22/2025 0928   Preoperative SpO2 0 filed at 01/22/2025 0928   Respiratory infection in the last month Either upper or lower (i.e., URI, bronchitis, pneumonia), with fever and antibiotic treatment 0 filed at 01/22/2025 0928   Preoperative anemia (Hgb less than 10 g/dl) 0 filed at 01/22/2025 0928   Surgical incision  0 filed at 01/22/2025 0928   Duration of surgery  0 filed at 01/22/2025 0928   Emergency Procedure  0 filed at 01/22/2025 0928   ARISCAT Total Score  3 filed at 01/22/2025 0928          Shauna Perioperative Risk for Myocardial Infarction or Cardiac Arrest (MARIBELL)      Flowsheet Row Pre-Admission Testing from 1/22/2025 in [facility]   Calculated Age Score 1.3 filed at 01/22/2025 0928   Functional Status  0 filed at 01/22/2025 0928   ASA Class  -3.29 filed at 01/22/2025 0928   Creatinine 0 filed at 01/22/2025 0928   Type of Procedure  0.80 filed at 01/22/2025 0928   MARIBELL Total Score  -6.44 filed at 01/22/2025 0928   MARIBELL % 0.16 filed at 01/22/2025 0928            Assessment and Plan:     Pre-Op  64-year-old female scheduled for right RESURFACING ARTHROPLASTY, SHOULDER, TOTAL on 1/28/2025 with   Ana Lilia. Blood work and MRSA ordered- oral chlorahexidine prescribed. EKG shows NSR with v rate of 67 bpm. She has been instructed to notify surgeon if any new skin changes occur to surgical limb prior to surgery. Otherwise no further orders indicated.                                                      Cardiac  Follows with cardiology, last seen in 2024  Hypertension, taking coreg, chlorthalidone and baby aspirin  Hyperlipidemia, taking statin  CAD,mild           LVEF 60% from echo                                                                                                                                                             DASI Score: 30.2   MET Score: 6.5  RCRI  0 which is 3.9% 30 day risk of MACE (risk for cardiac death, nonfatal myocardial infarction, and nonfactal cardiac arrest)  MARIBELL score which indicates a  0.16 % risk of intraoperative or 30-day postoperative MACE    Pulmonary  -Asthma, mild and stable, has rescue inhaler for as needed use  Preoperative deep breathing educational handout provided to patient.  STOP BAN   points which is a low risk for moderate to severe CHAZ  ARISCAT:    3  points which is a low (1.6%) risk of in-hospital post-op pulmonary complications     Neuro  Migraines, compliant with Topamax and has Imitrex for as needed use when symptoms arise    Endocrine  BMI 32.26  Preoperative hemoglobin A1c ordered    GI  GERD, taking PPI  Apfel: 3 points 61% risk for post operative N/V    /Renal  Counseled on avoiding NSAIDs, adequate hydration    Musculoskeletal   Osteoarthritis    Hematologic  No hematological medical history, however due to high Caprini score of  8 patient is at HIGH risk for perioperative DVT, informative education handout provided    Skin check: Patient was instructed to make surgeon aware of any skin changes/concerns prior to surgery.     Anesthesia:  Patient denies any anesthesia complications.     See risk scores as previously documented.

## 2025-01-22 NOTE — PREPROCEDURE INSTRUCTIONS
Thank you for visiting Preadmission Testing at [facility]. If you have any changes to your health condition, please call the SURGEON's office to alert them and give them details of your symptoms.        Preoperative Brain Exercises    What are brain exercises?  A brain exercise is any activity that engages your thinking (cognitive) skills.    What types of activities are considered brain exercises?  Jigsaw puzzles, crossword puzzles, word jumble, memory games, word search, and many more.  Many can be found free online or on your phone via a mobile vick.    Why should I do brain exercises before my surgery?  More recent research has shown brain exercise before surgery can lower the risk of postoperative delirium (confusion) which can be especially important for older adults.  Patients who did brain exercises for 5 to 10 hours the days before surgery, cut their risk of postoperative delirium in half up to 1 week after surgery.      Preoperative Deep Breathing Exercises    Why it is important to do deep breathing exercises before my surgery?  Deep breathing exercises strengthen your breathing muscles.  This helps you to recover after your surgery and decreases the chance of breathing complications.    How are the deep breathing exercises done?  Sit straight with your back supported.  Breathe in deeply and slowly through your nose. Your lower rib cage should expand and your abdomen may move forward.  Hold that breath for 3 to 5 seconds.  Breathe out through pursed lips, slowly and completely.  Rest and repeat 10 times every hour while awake.  Rest longer if you become dizzy or lightheaded.      Patient and Family Education   Ways You Can Help Prevent Blood Clots     This handout explains some simple things you can do to help prevent blood clots.      Blood clots are blockages that can form in the body's veins. When a blood clot forms in your deep veins, it may be called a deep vein thrombosis, or DVT for short. Blood clots can  happen in any part of the body where blood flows, but they are most common in the arms and legs. If a piece of a blood clot breaks free and travels to the lungs, it is called a pulmonary embolus (PE). A PE can be a very serious problem.      Being in the hospital or having surgery can raise your chances of getting a blood clot because you may not be well enough to move around as much as you normally do.      Ways you can help prevent blood clots in the hospital         Wearing SCDs. SCDs stands for Sequential Compression Devices.   SCDs are special sleeves that wrap around your legs  They attach to a pump that fills them with air to gently squeeze your legs every few minutes.   This helps return the blood in your legs to your heart.   SCDs should only be taken off when walking or bathing.   SCDs may not be comfortable, but they can help save your life.               Wearing compression stockings - if your doctor orders them. These special snug fitting stockings gently squeeze your legs to help blood flow.       Walking. Walking helps move the blood in your legs.   If your doctor says it is ok, try walking the halls at least   5 times a day. Ask us to help you get up, so you don't fall.      Taking any blood thinning medicines your doctor orders.          ©[facility]; 3/23        Ways you can help prevent blood clots at home       Wearing compression stockings - if your doctor orders them. ? Walking - to help move the blood in your legs.       Taking any blood thinning medicines your doctor orders.      Signs of a blood clot or PE      Tell your doctor or nurse know right away if you have of the problems listed below.    If you are at home, seek medical care right away. Call 911 for chest pain or problems breathing.          Signs of a blood clot (DVT) - such as pain,  swelling, redness or warmth in your arm or leg      Signs of a pulmonary embolism (PE) - such as chest     pain or feeling short of breath

## 2025-01-24 LAB — STAPHYLOCOCCUS SPEC CULT: NORMAL

## 2025-01-28 ENCOUNTER — ANESTHESIA EVENT (OUTPATIENT)
Dept: OPERATING ROOM | Facility: HOSPITAL | Age: 65
End: 2025-01-28
Payer: COMMERCIAL

## 2025-01-28 ENCOUNTER — HOSPITAL ENCOUNTER (OUTPATIENT)
Facility: HOSPITAL | Age: 65
Discharge: HOME | End: 2025-01-29
Attending: ORTHOPAEDIC SURGERY | Admitting: ORTHOPAEDIC SURGERY
Payer: COMMERCIAL

## 2025-01-28 ENCOUNTER — ANESTHESIA (OUTPATIENT)
Dept: OPERATING ROOM | Facility: HOSPITAL | Age: 65
End: 2025-01-28
Payer: COMMERCIAL

## 2025-01-28 DIAGNOSIS — M19.011 PRIMARY OSTEOARTHRITIS, RIGHT SHOULDER: Primary | ICD-10-CM

## 2025-01-28 PROCEDURE — 2500000004 HC RX 250 GENERAL PHARMACY W/ HCPCS (ALT 636 FOR OP/ED): Performed by: ANESTHESIOLOGIST ASSISTANT

## 2025-01-28 PROCEDURE — C1769 GUIDE WIRE: HCPCS | Performed by: ORTHOPAEDIC SURGERY

## 2025-01-28 PROCEDURE — 7100000011 HC EXTENDED STAY RECOVERY HOURLY - NURSING UNIT

## 2025-01-28 PROCEDURE — 2500000005 HC RX 250 GENERAL PHARMACY W/O HCPCS: Performed by: ORTHOPAEDIC SURGERY

## 2025-01-28 PROCEDURE — C1713 ANCHOR/SCREW BN/BN,TIS/BN: HCPCS | Performed by: ORTHOPAEDIC SURGERY

## 2025-01-28 PROCEDURE — 2720000007 HC OR 272 NO HCPCS: Performed by: ORTHOPAEDIC SURGERY

## 2025-01-28 PROCEDURE — 3600000009 HC OR TIME - EACH INCREMENTAL 1 MINUTE - PROCEDURE LEVEL FOUR: Performed by: ORTHOPAEDIC SURGERY

## 2025-01-28 PROCEDURE — A23470 PR RECONSTRUCT PROX HUMERAL IMPLANT: Performed by: ANESTHESIOLOGY

## 2025-01-28 PROCEDURE — 2500000004 HC RX 250 GENERAL PHARMACY W/ HCPCS (ALT 636 FOR OP/ED): Performed by: ANESTHESIOLOGY

## 2025-01-28 PROCEDURE — 2500000005 HC RX 250 GENERAL PHARMACY W/O HCPCS: Performed by: ANESTHESIOLOGY

## 2025-01-28 PROCEDURE — 2780000003 HC OR 278 NO HCPCS: Performed by: ORTHOPAEDIC SURGERY

## 2025-01-28 PROCEDURE — 2500000001 HC RX 250 WO HCPCS SELF ADMINISTERED DRUGS (ALT 637 FOR MEDICARE OP): Performed by: PHYSICIAN ASSISTANT

## 2025-01-28 PROCEDURE — A6213 FOAM DRG >16<=48 SQ IN W/BDR: HCPCS | Performed by: ORTHOPAEDIC SURGERY

## 2025-01-28 PROCEDURE — A23470 PR RECONSTRUCT PROX HUMERAL IMPLANT: Performed by: ANESTHESIOLOGIST ASSISTANT

## 2025-01-28 PROCEDURE — 2500000004 HC RX 250 GENERAL PHARMACY W/ HCPCS (ALT 636 FOR OP/ED): Mod: JZ | Performed by: ORTHOPAEDIC SURGERY

## 2025-01-28 PROCEDURE — 3700000002 HC GENERAL ANESTHESIA TIME - EACH INCREMENTAL 1 MINUTE: Performed by: ORTHOPAEDIC SURGERY

## 2025-01-28 PROCEDURE — 64415 NJX AA&/STRD BRCH PLXS IMG: CPT | Performed by: ANESTHESIOLOGY

## 2025-01-28 PROCEDURE — 7100000002 HC RECOVERY ROOM TIME - EACH INCREMENTAL 1 MINUTE: Performed by: ORTHOPAEDIC SURGERY

## 2025-01-28 PROCEDURE — 3600000004 HC OR TIME - INITIAL BASE CHARGE - PROCEDURE LEVEL FOUR: Performed by: ORTHOPAEDIC SURGERY

## 2025-01-28 PROCEDURE — C1776 JOINT DEVICE (IMPLANTABLE): HCPCS | Performed by: ORTHOPAEDIC SURGERY

## 2025-01-28 PROCEDURE — 7100000001 HC RECOVERY ROOM TIME - INITIAL BASE CHARGE: Performed by: ORTHOPAEDIC SURGERY

## 2025-01-28 PROCEDURE — 2500000004 HC RX 250 GENERAL PHARMACY W/ HCPCS (ALT 636 FOR OP/ED): Performed by: PHYSICIAN ASSISTANT

## 2025-01-28 PROCEDURE — 3700000001 HC GENERAL ANESTHESIA TIME - INITIAL BASE CHARGE: Performed by: ORTHOPAEDIC SURGERY

## 2025-01-28 PROCEDURE — 2500000005 HC RX 250 GENERAL PHARMACY W/O HCPCS: Performed by: ANESTHESIOLOGIST ASSISTANT

## 2025-01-28 DEVICE — SMARTSET HV HIGH VISCOSITY BONE CEMENT 40G
Type: IMPLANTABLE DEVICE | Site: SHOULDER | Status: FUNCTIONAL
Brand: SMARTSET

## 2025-01-28 DEVICE — IMPLANTABLE DEVICE: Type: IMPLANTABLE DEVICE | Site: SHOULDER | Status: FUNCTIONAL

## 2025-01-28 RX ORDER — MORPHINE SULFATE 2 MG/ML
2 INJECTION, SOLUTION INTRAMUSCULAR; INTRAVENOUS EVERY 2 HOUR PRN
Status: DISCONTINUED | OUTPATIENT
Start: 2025-01-28 | End: 2025-01-30 | Stop reason: HOSPADM

## 2025-01-28 RX ORDER — PROPOFOL 10 MG/ML
INJECTION, EMULSION INTRAVENOUS AS NEEDED
Status: DISCONTINUED | OUTPATIENT
Start: 2025-01-28 | End: 2025-01-28

## 2025-01-28 RX ORDER — LABETALOL HYDROCHLORIDE 5 MG/ML
5 INJECTION, SOLUTION INTRAVENOUS ONCE AS NEEDED
Status: DISCONTINUED | OUTPATIENT
Start: 2025-01-28 | End: 2025-01-28 | Stop reason: HOSPADM

## 2025-01-28 RX ORDER — ONDANSETRON HYDROCHLORIDE 2 MG/ML
4 INJECTION, SOLUTION INTRAVENOUS EVERY 8 HOURS PRN
Status: DISCONTINUED | OUTPATIENT
Start: 2025-01-28 | End: 2025-01-30 | Stop reason: HOSPADM

## 2025-01-28 RX ORDER — SODIUM CHLORIDE, SODIUM LACTATE, POTASSIUM CHLORIDE, CALCIUM CHLORIDE 600; 310; 30; 20 MG/100ML; MG/100ML; MG/100ML; MG/100ML
100 INJECTION, SOLUTION INTRAVENOUS CONTINUOUS
Status: DISCONTINUED | OUTPATIENT
Start: 2025-01-28 | End: 2025-01-28 | Stop reason: HOSPADM

## 2025-01-28 RX ORDER — ONDANSETRON 4 MG/1
4 TABLET, FILM COATED ORAL EVERY 8 HOURS PRN
Status: DISCONTINUED | OUTPATIENT
Start: 2025-01-28 | End: 2025-01-30 | Stop reason: HOSPADM

## 2025-01-28 RX ORDER — ALBUTEROL SULFATE 0.83 MG/ML
2.5 SOLUTION RESPIRATORY (INHALATION) ONCE AS NEEDED
Status: DISCONTINUED | OUTPATIENT
Start: 2025-01-28 | End: 2025-01-28 | Stop reason: HOSPADM

## 2025-01-28 RX ORDER — CYCLOBENZAPRINE HCL 10 MG
10 TABLET ORAL 3 TIMES DAILY PRN
Status: DISCONTINUED | OUTPATIENT
Start: 2025-01-28 | End: 2025-01-30 | Stop reason: HOSPADM

## 2025-01-28 RX ORDER — OXYCODONE HYDROCHLORIDE 5 MG/1
5 TABLET ORAL EVERY 4 HOURS PRN
Status: DISCONTINUED | OUTPATIENT
Start: 2025-01-28 | End: 2025-01-28 | Stop reason: HOSPADM

## 2025-01-28 RX ORDER — SODIUM CHLORIDE, SODIUM LACTATE, POTASSIUM CHLORIDE, CALCIUM CHLORIDE 600; 310; 30; 20 MG/100ML; MG/100ML; MG/100ML; MG/100ML
50 INJECTION, SOLUTION INTRAVENOUS CONTINUOUS
Status: DISCONTINUED | OUTPATIENT
Start: 2025-01-28 | End: 2025-01-29

## 2025-01-28 RX ORDER — CEFAZOLIN SODIUM 2 G/100ML
2 INJECTION, SOLUTION INTRAVENOUS ONCE
Status: COMPLETED | OUTPATIENT
Start: 2025-01-28 | End: 2025-01-28

## 2025-01-28 RX ORDER — GLYCOPYRROLATE 0.2 MG/ML
INJECTION INTRAMUSCULAR; INTRAVENOUS AS NEEDED
Status: DISCONTINUED | OUTPATIENT
Start: 2025-01-28 | End: 2025-01-28

## 2025-01-28 RX ORDER — OXYCODONE HYDROCHLORIDE 5 MG/1
5 TABLET ORAL EVERY 6 HOURS PRN
Status: DISCONTINUED | OUTPATIENT
Start: 2025-01-28 | End: 2025-01-30 | Stop reason: HOSPADM

## 2025-01-28 RX ORDER — OXYCODONE HYDROCHLORIDE 10 MG/1
10 TABLET ORAL EVERY 4 HOURS PRN
Status: DISCONTINUED | OUTPATIENT
Start: 2025-01-28 | End: 2025-01-30 | Stop reason: HOSPADM

## 2025-01-28 RX ORDER — HYDROMORPHONE HYDROCHLORIDE 0.2 MG/ML
0.2 INJECTION INTRAMUSCULAR; INTRAVENOUS; SUBCUTANEOUS EVERY 5 MIN PRN
Status: DISCONTINUED | OUTPATIENT
Start: 2025-01-28 | End: 2025-01-28 | Stop reason: HOSPADM

## 2025-01-28 RX ORDER — ROCURONIUM BROMIDE 50 MG/5 ML
SYRINGE (ML) INTRAVENOUS AS NEEDED
Status: DISCONTINUED | OUTPATIENT
Start: 2025-01-28 | End: 2025-01-28

## 2025-01-28 RX ORDER — POLYETHYLENE GLYCOL 3350 17 G/17G
17 POWDER, FOR SOLUTION ORAL DAILY
Status: DISCONTINUED | OUTPATIENT
Start: 2025-01-28 | End: 2025-01-30 | Stop reason: HOSPADM

## 2025-01-28 RX ORDER — LIDOCAINE HYDROCHLORIDE 10 MG/ML
0.1 INJECTION, SOLUTION INFILTRATION; PERINEURAL ONCE
Status: DISCONTINUED | OUTPATIENT
Start: 2025-01-28 | End: 2025-01-28 | Stop reason: HOSPADM

## 2025-01-28 RX ORDER — SUCRALFATE 1 G/1
1 TABLET ORAL EVERY 6 HOURS PRN
Status: DISCONTINUED | OUTPATIENT
Start: 2025-01-28 | End: 2025-01-30 | Stop reason: HOSPADM

## 2025-01-28 RX ORDER — CEFAZOLIN SODIUM 2 G/100ML
2 INJECTION, SOLUTION INTRAVENOUS EVERY 8 HOURS
Status: COMPLETED | OUTPATIENT
Start: 2025-01-28 | End: 2025-01-29

## 2025-01-28 RX ORDER — ONDANSETRON HYDROCHLORIDE 2 MG/ML
4 INJECTION, SOLUTION INTRAVENOUS ONCE AS NEEDED
Status: COMPLETED | OUTPATIENT
Start: 2025-01-28 | End: 2025-01-28

## 2025-01-28 RX ORDER — ACETAMINOPHEN 325 MG/1
975 TABLET ORAL ONCE
Status: DISCONTINUED | OUTPATIENT
Start: 2025-01-28 | End: 2025-01-28 | Stop reason: HOSPADM

## 2025-01-28 RX ORDER — HYDRALAZINE HYDROCHLORIDE 20 MG/ML
5 INJECTION INTRAMUSCULAR; INTRAVENOUS EVERY 30 MIN PRN
Status: DISCONTINUED | OUTPATIENT
Start: 2025-01-28 | End: 2025-01-28 | Stop reason: HOSPADM

## 2025-01-28 RX ORDER — PHENYLEPHRINE HCL IN 0.9% NACL 1 MG/10 ML
SYRINGE (ML) INTRAVENOUS AS NEEDED
Status: DISCONTINUED | OUTPATIENT
Start: 2025-01-28 | End: 2025-01-28

## 2025-01-28 RX ORDER — MIDAZOLAM HYDROCHLORIDE 1 MG/ML
INJECTION, SOLUTION INTRAMUSCULAR; INTRAVENOUS AS NEEDED
Status: DISCONTINUED | OUTPATIENT
Start: 2025-01-28 | End: 2025-01-28

## 2025-01-28 RX ORDER — KETOROLAC TROMETHAMINE 15 MG/ML
15 INJECTION, SOLUTION INTRAMUSCULAR; INTRAVENOUS EVERY 6 HOURS
Status: COMPLETED | OUTPATIENT
Start: 2025-01-28 | End: 2025-01-29

## 2025-01-28 RX ORDER — TALC
3 POWDER (GRAM) TOPICAL NIGHTLY PRN
Status: DISCONTINUED | OUTPATIENT
Start: 2025-01-28 | End: 2025-01-30 | Stop reason: HOSPADM

## 2025-01-28 RX ORDER — ONDANSETRON HYDROCHLORIDE 2 MG/ML
INJECTION, SOLUTION INTRAVENOUS AS NEEDED
Status: DISCONTINUED | OUTPATIENT
Start: 2025-01-28 | End: 2025-01-28

## 2025-01-28 RX ORDER — PHENYLEPHRINE 10 MG/250 ML(40 MCG/ML)IN 0.9 % SOD.CHLORIDE INTRAVENOUS
CONTINUOUS PRN
Status: DISCONTINUED | OUTPATIENT
Start: 2025-01-28 | End: 2025-01-28

## 2025-01-28 RX ORDER — OXYCODONE AND ACETAMINOPHEN 5; 325 MG/1; MG/1
1 TABLET ORAL EVERY 4 HOURS PRN
Status: DISCONTINUED | OUTPATIENT
Start: 2025-01-28 | End: 2025-01-28 | Stop reason: HOSPADM

## 2025-01-28 RX ORDER — ACETAMINOPHEN 325 MG/1
650 TABLET ORAL EVERY 6 HOURS SCHEDULED
Status: DISCONTINUED | OUTPATIENT
Start: 2025-01-28 | End: 2025-01-30 | Stop reason: HOSPADM

## 2025-01-28 RX ORDER — DIPHENHYDRAMINE HYDROCHLORIDE 50 MG/ML
12.5 INJECTION INTRAMUSCULAR; INTRAVENOUS ONCE AS NEEDED
Status: DISCONTINUED | OUTPATIENT
Start: 2025-01-28 | End: 2025-01-28 | Stop reason: HOSPADM

## 2025-01-28 RX ORDER — FENTANYL CITRATE 50 UG/ML
INJECTION, SOLUTION INTRAMUSCULAR; INTRAVENOUS AS NEEDED
Status: DISCONTINUED | OUTPATIENT
Start: 2025-01-28 | End: 2025-01-28

## 2025-01-28 RX ORDER — LIDOCAINE HCL/PF 100 MG/5ML
SYRINGE (ML) INTRAVENOUS AS NEEDED
Status: DISCONTINUED | OUTPATIENT
Start: 2025-01-28 | End: 2025-01-28

## 2025-01-28 RX ORDER — BISACODYL 5 MG
10 TABLET, DELAYED RELEASE (ENTERIC COATED) ORAL DAILY PRN
Status: DISCONTINUED | OUTPATIENT
Start: 2025-01-28 | End: 2025-01-30 | Stop reason: HOSPADM

## 2025-01-28 RX ORDER — NALOXONE HYDROCHLORIDE 0.4 MG/ML
0.2 INJECTION, SOLUTION INTRAMUSCULAR; INTRAVENOUS; SUBCUTANEOUS EVERY 5 MIN PRN
Status: DISCONTINUED | OUTPATIENT
Start: 2025-01-28 | End: 2025-01-30 | Stop reason: HOSPADM

## 2025-01-28 RX ADMIN — SODIUM CHLORIDE, POTASSIUM CHLORIDE, SODIUM LACTATE AND CALCIUM CHLORIDE: 600; 310; 30; 20 INJECTION, SOLUTION INTRAVENOUS at 07:45

## 2025-01-28 RX ADMIN — Medication 200 MCG: at 08:59

## 2025-01-28 RX ADMIN — LIDOCAINE HYDROCHLORIDE 80 MG: 20 INJECTION INTRAVENOUS at 08:32

## 2025-01-28 RX ADMIN — Medication 100 MCG: at 09:23

## 2025-01-28 RX ADMIN — Medication 200 MCG: at 08:38

## 2025-01-28 RX ADMIN — PROMETHAZINE HYDROCHLORIDE 6.25 MG: 25 INJECTION INTRAMUSCULAR; INTRAVENOUS at 11:45

## 2025-01-28 RX ADMIN — ONDANSETRON 4 MG: 2 INJECTION INTRAMUSCULAR; INTRAVENOUS at 10:26

## 2025-01-28 RX ADMIN — KETOROLAC TROMETHAMINE 15 MG: 15 INJECTION, SOLUTION INTRAMUSCULAR; INTRAVENOUS at 15:57

## 2025-01-28 RX ADMIN — GLYCOPYRROLATE 0.2 MG: 0.2 INJECTION INTRAMUSCULAR; INTRAVENOUS at 08:42

## 2025-01-28 RX ADMIN — PROPOFOL 150 MG: 10 INJECTION, EMULSION INTRAVENOUS at 08:32

## 2025-01-28 RX ADMIN — Medication 200 MCG: at 08:51

## 2025-01-28 RX ADMIN — ACETAMINOPHEN 325MG 650 MG: 325 TABLET ORAL at 15:57

## 2025-01-28 RX ADMIN — ACETAMINOPHEN 325MG 650 MG: 325 TABLET ORAL at 22:51

## 2025-01-28 RX ADMIN — CEFAZOLIN SODIUM 2 G: 2 INJECTION, SOLUTION INTRAVENOUS at 17:42

## 2025-01-28 RX ADMIN — KETOROLAC TROMETHAMINE 15 MG: 15 INJECTION, SOLUTION INTRAMUSCULAR; INTRAVENOUS at 22:52

## 2025-01-28 RX ADMIN — CEFAZOLIN SODIUM 2 G: 2 INJECTION, SOLUTION INTRAVENOUS at 08:41

## 2025-01-28 RX ADMIN — ONDANSETRON 4 MG: 2 INJECTION INTRAMUSCULAR; INTRAVENOUS at 11:09

## 2025-01-28 RX ADMIN — Medication 200 MCG: at 09:07

## 2025-01-28 RX ADMIN — Medication 50 MG: at 08:32

## 2025-01-28 RX ADMIN — Medication 10 L/MIN: at 10:44

## 2025-01-28 RX ADMIN — Medication 2 L/MIN: at 12:45

## 2025-01-28 RX ADMIN — POVIDONE-IODINE 1 APPLICATION: 5 SOLUTION TOPICAL at 07:21

## 2025-01-28 RX ADMIN — Medication 10 MG: at 08:55

## 2025-01-28 RX ADMIN — Medication 0.41 MCG/KG/MIN: at 09:23

## 2025-01-28 RX ADMIN — HYDROMORPHONE HYDROCHLORIDE 0.5 MG: 1 INJECTION, SOLUTION INTRAMUSCULAR; INTRAVENOUS; SUBCUTANEOUS at 11:08

## 2025-01-28 RX ADMIN — OXYCODONE HYDROCHLORIDE 5 MG: 5 TABLET ORAL at 22:51

## 2025-01-28 RX ADMIN — MIDAZOLAM 2 MG: 1 INJECTION INTRAMUSCULAR; INTRAVENOUS at 07:47

## 2025-01-28 RX ADMIN — PROPOFOL 50 MG: 10 INJECTION, EMULSION INTRAVENOUS at 09:13

## 2025-01-28 RX ADMIN — FENTANYL CITRATE 50 MCG: 50 INJECTION, SOLUTION INTRAMUSCULAR; INTRAVENOUS at 09:13

## 2025-01-28 RX ADMIN — FENTANYL CITRATE 50 MCG: 50 INJECTION, SOLUTION INTRAMUSCULAR; INTRAVENOUS at 07:49

## 2025-01-28 RX ADMIN — SUGAMMADEX 200 MG: 100 INJECTION, SOLUTION INTRAVENOUS at 10:36

## 2025-01-28 SDOH — HEALTH STABILITY: MENTAL HEALTH: HOW OFTEN DO YOU HAVE SIX OR MORE DRINKS ON ONE OCCASION?: NEVER

## 2025-01-28 SDOH — SOCIAL STABILITY: SOCIAL INSECURITY: DOES ANYONE TRY TO KEEP YOU FROM HAVING/CONTACTING OTHER FRIENDS OR DOING THINGS OUTSIDE YOUR HOME?: NO

## 2025-01-28 SDOH — SOCIAL STABILITY: SOCIAL INSECURITY
WITHIN THE LAST YEAR, HAVE YOU BEEN RAPED OR FORCED TO HAVE ANY KIND OF SEXUAL ACTIVITY BY YOUR PARTNER OR EX-PARTNER?: NO

## 2025-01-28 SDOH — HEALTH STABILITY: MENTAL HEALTH: HOW MANY DRINKS CONTAINING ALCOHOL DO YOU HAVE ON A TYPICAL DAY WHEN YOU ARE DRINKING?: PATIENT DOES NOT DRINK

## 2025-01-28 SDOH — ECONOMIC STABILITY: FOOD INSECURITY
WITHIN THE PAST 12 MONTHS, YOU WORRIED THAT YOUR FOOD WOULD RUN OUT BEFORE YOU GOT THE MONEY TO BUY MORE.: PATIENT DECLINED

## 2025-01-28 SDOH — SOCIAL STABILITY: SOCIAL INSECURITY: HAVE YOU HAD ANY THOUGHTS OF HARMING ANYONE ELSE?: NO

## 2025-01-28 SDOH — SOCIAL STABILITY: SOCIAL INSECURITY: WITHIN THE LAST YEAR, HAVE YOU BEEN HUMILIATED OR EMOTIONALLY ABUSED IN OTHER WAYS BY YOUR PARTNER OR EX-PARTNER?: NO

## 2025-01-28 SDOH — SOCIAL STABILITY: SOCIAL INSECURITY: DO YOU FEEL UNSAFE GOING BACK TO THE PLACE WHERE YOU ARE LIVING?: NO

## 2025-01-28 SDOH — SOCIAL STABILITY: SOCIAL INSECURITY: HAS ANYONE EVER THREATENED TO HURT YOUR FAMILY OR YOUR PETS?: NO

## 2025-01-28 SDOH — SOCIAL STABILITY: SOCIAL INSECURITY
WITHIN THE LAST YEAR, HAVE YOU BEEN KICKED, HIT, SLAPPED, OR OTHERWISE PHYSICALLY HURT BY YOUR PARTNER OR EX-PARTNER?: NO

## 2025-01-28 SDOH — HEALTH STABILITY: MENTAL HEALTH: HOW OFTEN DO YOU HAVE A DRINK CONTAINING ALCOHOL?: NEVER

## 2025-01-28 SDOH — SOCIAL STABILITY: SOCIAL INSECURITY: WERE YOU ABLE TO COMPLETE ALL THE BEHAVIORAL HEALTH SCREENINGS?: YES

## 2025-01-28 SDOH — SOCIAL STABILITY: SOCIAL INSECURITY: DO YOU FEEL ANYONE HAS EXPLOITED OR TAKEN ADVANTAGE OF YOU FINANCIALLY OR OF YOUR PERSONAL PROPERTY?: NO

## 2025-01-28 SDOH — SOCIAL STABILITY: SOCIAL INSECURITY: ARE THERE ANY APPARENT SIGNS OF INJURIES/BEHAVIORS THAT COULD BE RELATED TO ABUSE/NEGLECT?: NO

## 2025-01-28 SDOH — SOCIAL STABILITY: SOCIAL INSECURITY: ABUSE: ADULT

## 2025-01-28 SDOH — SOCIAL STABILITY: SOCIAL INSECURITY: WITHIN THE LAST YEAR, HAVE YOU BEEN AFRAID OF YOUR PARTNER OR EX-PARTNER?: NO

## 2025-01-28 SDOH — ECONOMIC STABILITY: INCOME INSECURITY
IN THE PAST 12 MONTHS HAS THE ELECTRIC, GAS, OIL, OR WATER COMPANY THREATENED TO SHUT OFF SERVICES IN YOUR HOME?: PATIENT DECLINED

## 2025-01-28 SDOH — ECONOMIC STABILITY: FOOD INSECURITY: WITHIN THE PAST 12 MONTHS, THE FOOD YOU BOUGHT JUST DIDN'T LAST AND YOU DIDN'T HAVE MONEY TO GET MORE.: PATIENT DECLINED

## 2025-01-28 SDOH — SOCIAL STABILITY: SOCIAL INSECURITY: HAVE YOU HAD THOUGHTS OF HARMING ANYONE ELSE?: NO

## 2025-01-28 SDOH — HEALTH STABILITY: MENTAL HEALTH: CURRENT SMOKER: 0

## 2025-01-28 SDOH — SOCIAL STABILITY: SOCIAL INSECURITY: ARE YOU OR HAVE YOU BEEN THREATENED OR ABUSED PHYSICALLY, EMOTIONALLY, OR SEXUALLY BY ANYONE?: NO

## 2025-01-28 ASSESSMENT — COGNITIVE AND FUNCTIONAL STATUS - GENERAL
TOILETING: A LITTLE
MOVING FROM LYING ON BACK TO SITTING ON SIDE OF FLAT BED WITH BEDRAILS: A LITTLE
PERSONAL GROOMING: A LITTLE
HELP NEEDED FOR BATHING: A LITTLE
MOBILITY SCORE: 18
MOVING TO AND FROM BED TO CHAIR: A LITTLE
WALKING IN HOSPITAL ROOM: A LITTLE
DRESSING REGULAR UPPER BODY CLOTHING: A LITTLE
STANDING UP FROM CHAIR USING ARMS: A LITTLE
PATIENT BASELINE BEDBOUND: NO
DRESSING REGULAR LOWER BODY CLOTHING: A LITTLE
CLIMB 3 TO 5 STEPS WITH RAILING: A LITTLE
DAILY ACTIVITIY SCORE: 18
TURNING FROM BACK TO SIDE WHILE IN FLAT BAD: A LITTLE
EATING MEALS: A LITTLE

## 2025-01-28 ASSESSMENT — PAIN DESCRIPTION - ORIENTATION
ORIENTATION: RIGHT
ORIENTATION: RIGHT

## 2025-01-28 ASSESSMENT — ACTIVITIES OF DAILY LIVING (ADL)
DRESSING YOURSELF: INDEPENDENT
TOILETING: INDEPENDENT
GROOMING: INDEPENDENT
HEARING - RIGHT EAR: FUNCTIONAL
ASSISTIVE_DEVICE: EYEGLASSES
WALKS IN HOME: INDEPENDENT
PATIENT'S MEMORY ADEQUATE TO SAFELY COMPLETE DAILY ACTIVITIES?: YES
ADEQUATE_TO_COMPLETE_ADL: YES
BATHING: INDEPENDENT
HEARING - LEFT EAR: FUNCTIONAL
FEEDING YOURSELF: INDEPENDENT
JUDGMENT_ADEQUATE_SAFELY_COMPLETE_DAILY_ACTIVITIES: YES
LACK_OF_TRANSPORTATION: PATIENT DECLINED

## 2025-01-28 ASSESSMENT — PAIN SCALES - GENERAL
PAINLEVEL_OUTOF10: 4
PAINLEVEL_OUTOF10: 4
PAINLEVEL_OUTOF10: 8
PAINLEVEL_OUTOF10: 0 - NO PAIN
PAINLEVEL_OUTOF10: 3
PAINLEVEL_OUTOF10: 0 - NO PAIN
PAINLEVEL_OUTOF10: 10 - WORST POSSIBLE PAIN
PAINLEVEL_OUTOF10: 0 - NO PAIN
PAINLEVEL_OUTOF10: 4
PAINLEVEL_OUTOF10: 5 - MODERATE PAIN
PAINLEVEL_OUTOF10: 10 - WORST POSSIBLE PAIN
PAINLEVEL_OUTOF10: 0 - NO PAIN
PAINLEVEL_OUTOF10: 3

## 2025-01-28 ASSESSMENT — PAIN - FUNCTIONAL ASSESSMENT
PAIN_FUNCTIONAL_ASSESSMENT: 0-10

## 2025-01-28 ASSESSMENT — LIFESTYLE VARIABLES
AUDIT-C TOTAL SCORE: 0
SKIP TO QUESTIONS 9-10: 1

## 2025-01-28 ASSESSMENT — PAIN DESCRIPTION - LOCATION
LOCATION: SHOULDER
LOCATION: SHOULDER

## 2025-01-28 ASSESSMENT — PATIENT HEALTH QUESTIONNAIRE - PHQ9
1. LITTLE INTEREST OR PLEASURE IN DOING THINGS: NOT AT ALL
2. FEELING DOWN, DEPRESSED OR HOPELESS: NOT AT ALL
SUM OF ALL RESPONSES TO PHQ9 QUESTIONS 1 & 2: 0

## 2025-01-28 NOTE — ANESTHESIA PROCEDURE NOTES
Airway  Date/Time: 1/28/2025 8:35 AM  Urgency: elective    Airway not difficult    Staffing  Performed: REJI   Authorized by: Corbin Gramajo MD    Performed by: REJI Hyatt  Patient location during procedure: OR    Indications and Patient Condition  Indications for airway management: anesthesia  Sedation level: deep  Preoxygenated: yes  Patient position: sniffing  MILS maintained throughout  Mask difficulty assessment: 1 - vent by mask    Final Airway Details  Final airway type: endotracheal airway      Successful airway: ETT  Cuffed: yes   Successful intubation technique: direct laryngoscopy  Blade: Fred  Blade size: #3  ETT size (mm): 7.0  Cormack-Lehane Classification: grade I - full view of glottis  Placement verified by: chest auscultation and capnometry   Measured from: lips  Number of attempts at approach: 1

## 2025-01-28 NOTE — ANESTHESIA PROCEDURE NOTES
Peripheral Block    Patient location during procedure: pre-op  Start time: 1/28/2025 7:42 AM  End time: 1/28/2025 7:46 AM  Reason for block: at surgeon's request and post-op pain management  Staffing  Performed: attending   Authorized by: Corbin Gramajo MD    Performed by: Corbin Gramajo MD  Preanesthetic Checklist  Completed: patient identified, IV checked, site marked, risks and benefits discussed, surgical consent, monitors and equipment checked, pre-op evaluation and timeout performed   Timeout performed at: 1/28/2025 7:41 AM  Peripheral Block  Patient position: sitting  Prep: ChloraPrep  Block type: brachial plexus  Laterality: right  Injection technique: single-shot  Local infiltration: bupivicaine  Infiltration strength: 0.5 %  Dose: 25 mL  Needle  Needle type: short-bevel   Needle gauge: 21 G  Needle length: 8 cm  Needle localization: ultrasound guidance     image stored in chart  Assessment  Injection assessment: negative aspiration for heme, no paresthesia on injection, incremental injection and local visualized surrounding nerve on ultrasound  Additional Notes  Immediate post procedure neurologic exam on RUE was intact.

## 2025-01-28 NOTE — ANESTHESIA POSTPROCEDURE EVALUATION
Patient: Juju Adams    Procedure Summary       Date: 01/28/25 Room / Location: [facility] OR 02 / [facility] OR    Anesthesia Start: 0826 Anesthesia Stop: 1046    Procedure: RESURFACING ARTHROPLASTY, SHOULDER, TOTAL (Right: Shoulder) Diagnosis:       Arthritis of right shoulder region      Partial nontraumatic tear of rotator cuff, right      (M19.011)      (M75.111)    Surgeons: Lorri Sung MD Responsible Provider: Corbin Gramajo MD    Anesthesia Type: general, regional ASA Status: 3            Anesthesia Type: general, regional    Vitals Value Taken Time   /82 01/28/25 1315   Temp 36.4 °C (97.5 °F) 01/28/25 1315   Pulse 85 01/28/25 1327   Resp 17 01/28/25 1327   SpO2 97 % 01/28/25 1327   Vitals shown include unfiled device data.    Anesthesia Post Evaluation    Patient location during evaluation: PACU  Patient participation: complete - patient participated  Level of consciousness: awake and alert  Pain management: satisfactory to patient  Airway patency: patent  Cardiovascular status: acceptable  Respiratory status: acceptable  Hydration status: acceptable  Postoperative Nausea and Vomiting: none        No notable events documented.

## 2025-01-28 NOTE — ANESTHESIA PREPROCEDURE EVALUATION
Patient: Juju Adams    Procedure Information       Date/Time: 01/28/25 0800    Procedure: RESURFACING ARTHROPLASTY, SHOULDER, TOTAL (Right: Shoulder)    Location: [facility] OR 02 / [facility]    Surgeons: Lorri Sung MD            Relevant Problems   Cardiac   (+) Coronary artery disease involving native coronary artery of native heart   (+) Hyperlipidemia   (+) Hypertension      Pulmonary   (+) Mild intermittent asthma without complication (HHS-HCC)      GI   (+) Gastroesophageal reflux disease      Endocrine   (+) Goiter   (+) Obesity      Hematology   (+) Anemia      Musculoskeletal   (+) OA (osteoarthritis) of knee   (+) Spinal stenosis of lumbar region without neurogenic claudication       Clinical information reviewed:   Tobacco  Allergies  Meds   Med Hx  Surg Hx  OB Status  Fam Hx  Soc   Hx        NPO Detail:  NPO/Void Status  Carbohydrate Drink Given Prior to Surgery? : N  Date of Last Liquid: 01/27/25  Time of Last Liquid: 2130  Date of Last Solid: 01/27/25  Time of Last Solid: 1830  Last Intake Type: Clear fluids  Time of Last Void: 0713         Physical Exam    Airway  Mallampati: II  TM distance: >3 FB  Neck ROM: full     Cardiovascular   Rhythm: regular  Rate: normal     Dental   (+) upper dentures, lower dentures     Pulmonary   Breath sounds clear to auscultation     Abdominal            Anesthesia Plan    History of general anesthesia?: yes  History of complications of general anesthesia?: no    ASA 3     general     The patient is not a current smoker.    intravenous induction   Postoperative administration of opioids is intended.  Anesthetic plan and risks discussed with patient.    Plan discussed with [facility]AA.

## 2025-01-28 NOTE — DISCHARGE INSTR - ACTIVITY
Call Dr. Sung for any problems and/or concerns.  *Non-weight bearing right upper extremity  *Wear arm sling as instructed:  Remove 3 times/day to move elbow, wrist, and hand  *Apply ice to shoulder to minimize swelling, on 20 minutes, off 20 minutes  *You may shower, do not soak or scrub incision; pat dry  *Keep dressing clean and dry. Change daily for bleeding/drainage-leave incision open to air if no drainage    Call Dr. right away for:  *Fever of 100.4 or above/shaking chills  *Increased swelling in your arm  *Increased redness and/or tenderness around the incision  *Persistent drainage from the incision  *Chest pain/shortness of breath-call [phone]

## 2025-01-29 ENCOUNTER — PHARMACY VISIT (OUTPATIENT)
Dept: PHARMACY | Facility: CLINIC | Age: 65
End: 2025-01-29
Payer: COMMERCIAL

## 2025-01-29 LAB
ANION GAP SERPL CALC-SCNC: 14 MMOL/L (ref 10–20)
BUN SERPL-MCNC: 16 MG/DL (ref 6–23)
CALCIUM SERPL-MCNC: 8.3 MG/DL (ref 8.6–10.3)
CHLORIDE SERPL-SCNC: 102 MMOL/L (ref 98–107)
CO2 SERPL-SCNC: 23 MMOL/L (ref 21–32)
CREAT SERPL-MCNC: 0.56 MG/DL (ref 0.5–1.05)
EGFRCR SERPLBLD CKD-EPI 2021: >90 ML/MIN/1.73M*2
ERYTHROCYTE [DISTWIDTH] IN BLOOD BY AUTOMATED COUNT: 12.6 % (ref 11.5–14.5)
GLUCOSE SERPL-MCNC: 132 MG/DL (ref 74–99)
HCT VFR BLD AUTO: 34.3 % (ref 36–46)
HGB BLD-MCNC: 11 G/DL (ref 12–16)
MCH RBC QN AUTO: 31.1 PG (ref 26–34)
MCHC RBC AUTO-ENTMCNC: 32.1 G/DL (ref 32–36)
MCV RBC AUTO: 97 FL (ref 80–100)
NRBC BLD-RTO: 0 /100 WBCS (ref 0–0)
PLATELET # BLD AUTO: 210 X10*3/UL (ref 150–450)
POTASSIUM SERPL-SCNC: 4 MMOL/L (ref 3.5–5.3)
RBC # BLD AUTO: 3.54 X10*6/UL (ref 4–5.2)
SODIUM SERPL-SCNC: 135 MMOL/L (ref 136–145)
WBC # BLD AUTO: 9.4 X10*3/UL (ref 4.4–11.3)

## 2025-01-29 PROCEDURE — 2500000004 HC RX 250 GENERAL PHARMACY W/ HCPCS (ALT 636 FOR OP/ED): Performed by: PHYSICIAN ASSISTANT

## 2025-01-29 PROCEDURE — 80048 BASIC METABOLIC PNL TOTAL CA: CPT | Performed by: PHYSICIAN ASSISTANT

## 2025-01-29 PROCEDURE — 85027 COMPLETE CBC AUTOMATED: CPT | Performed by: PHYSICIAN ASSISTANT

## 2025-01-29 PROCEDURE — 97161 PT EVAL LOW COMPLEX 20 MIN: CPT | Mod: GP

## 2025-01-29 PROCEDURE — 36415 COLL VENOUS BLD VENIPUNCTURE: CPT | Performed by: PHYSICIAN ASSISTANT

## 2025-01-29 PROCEDURE — RXMED WILLOW AMBULATORY MEDICATION CHARGE

## 2025-01-29 PROCEDURE — 2500000001 HC RX 250 WO HCPCS SELF ADMINISTERED DRUGS (ALT 637 FOR MEDICARE OP): Performed by: PHYSICIAN ASSISTANT

## 2025-01-29 PROCEDURE — 2500000001 HC RX 250 WO HCPCS SELF ADMINISTERED DRUGS (ALT 637 FOR MEDICARE OP)

## 2025-01-29 PROCEDURE — 97165 OT EVAL LOW COMPLEX 30 MIN: CPT | Mod: GO | Performed by: OCCUPATIONAL THERAPIST

## 2025-01-29 PROCEDURE — 7100000011 HC EXTENDED STAY RECOVERY HOURLY - NURSING UNIT

## 2025-01-29 RX ORDER — POLYETHYLENE GLYCOL 3350 17 G/17G
17 POWDER, FOR SOLUTION ORAL DAILY
Qty: 7 PACKET | Refills: 0 | Status: SHIPPED | OUTPATIENT
Start: 2025-01-30 | End: 2025-02-06

## 2025-01-29 RX ORDER — ACETAMINOPHEN 325 MG/1
650 TABLET ORAL EVERY 6 HOURS PRN
Start: 2025-01-29 | End: 2025-02-28

## 2025-01-29 RX ADMIN — POLYETHYLENE GLYCOL 3350 17 G: 17 POWDER, FOR SOLUTION ORAL at 08:47

## 2025-01-29 RX ADMIN — ACETAMINOPHEN 325MG 650 MG: 325 TABLET ORAL at 08:58

## 2025-01-29 RX ADMIN — ACETAMINOPHEN 325MG 650 MG: 325 TABLET ORAL at 18:07

## 2025-01-29 RX ADMIN — ACETAMINOPHEN 325MG 650 MG: 325 TABLET ORAL at 05:14

## 2025-01-29 RX ADMIN — OXYCODONE HYDROCHLORIDE 10 MG: 10 TABLET ORAL at 05:14

## 2025-01-29 RX ADMIN — SODIUM CHLORIDE, POTASSIUM CHLORIDE, SODIUM LACTATE AND CALCIUM CHLORIDE 50 ML/HR: 600; 310; 30; 20 INJECTION, SOLUTION INTRAVENOUS at 00:42

## 2025-01-29 RX ADMIN — SUCRALFATE 1 G: 1 TABLET ORAL at 00:38

## 2025-01-29 RX ADMIN — ONDANSETRON HYDROCHLORIDE 4 MG: 4 TABLET, FILM COATED ORAL at 12:07

## 2025-01-29 RX ADMIN — OXYCODONE HYDROCHLORIDE 10 MG: 10 TABLET ORAL at 20:18

## 2025-01-29 RX ADMIN — KETOROLAC TROMETHAMINE 15 MG: 15 INJECTION, SOLUTION INTRAMUSCULAR; INTRAVENOUS at 05:14

## 2025-01-29 RX ADMIN — CEFAZOLIN SODIUM 2 G: 2 INJECTION, SOLUTION INTRAVENOUS at 00:21

## 2025-01-29 RX ADMIN — KETOROLAC TROMETHAMINE 15 MG: 15 INJECTION, SOLUTION INTRAMUSCULAR; INTRAVENOUS at 12:06

## 2025-01-29 ASSESSMENT — PAIN DESCRIPTION - LOCATION
LOCATION: SHOULDER
LOCATION: SHOULDER
LOCATION: ARM
LOCATION: SHOULDER

## 2025-01-29 ASSESSMENT — COGNITIVE AND FUNCTIONAL STATUS - GENERAL
MOVING FROM LYING ON BACK TO SITTING ON SIDE OF FLAT BED WITH BEDRAILS: A LITTLE
EATING MEALS: A LITTLE
TOILETING: A LITTLE
DRESSING REGULAR UPPER BODY CLOTHING: A LITTLE
CLIMB 3 TO 5 STEPS WITH RAILING: A LITTLE
DRESSING REGULAR LOWER BODY CLOTHING: A LITTLE
HELP NEEDED FOR BATHING: A LITTLE
MOVING TO AND FROM BED TO CHAIR: A LITTLE
TOILETING: A LITTLE
PERSONAL GROOMING: A LITTLE
DAILY ACTIVITIY SCORE: 18
MOBILITY SCORE: 23
MOBILITY SCORE: 18
HELP NEEDED FOR BATHING: A LITTLE
DRESSING REGULAR UPPER BODY CLOTHING: A LOT
DRESSING REGULAR LOWER BODY CLOTHING: A LITTLE
WALKING IN HOSPITAL ROOM: A LITTLE
DAILY ACTIVITIY SCORE: 18
PERSONAL GROOMING: A LITTLE
TURNING FROM BACK TO SIDE WHILE IN FLAT BAD: A LITTLE
STANDING UP FROM CHAIR USING ARMS: A LITTLE
TURNING FROM BACK TO SIDE WHILE IN FLAT BAD: A LITTLE

## 2025-01-29 ASSESSMENT — PAIN SCALES - PAIN ASSESSMENT IN ADVANCED DEMENTIA (PAINAD)
TOTALSCORE: 0
FACIALEXPRESSION: SMILING OR INEXPRESSIVE
TOTALSCORE: MEDICATION (SEE MAR)
BODYLANGUAGE: RELAXED
BREATHING: NORMAL
CONSOLABILITY: NO NEED TO CONSOLE

## 2025-01-29 ASSESSMENT — PAIN SCALES - GENERAL
PAINLEVEL_OUTOF10: 6
PAINLEVEL_OUTOF10: 2
PAINLEVEL_OUTOF10: 6
PAINLEVEL_OUTOF10: 8
PAINLEVEL_OUTOF10: 9

## 2025-01-29 ASSESSMENT — PAIN - FUNCTIONAL ASSESSMENT
PAIN_FUNCTIONAL_ASSESSMENT: 0-10
PAIN_FUNCTIONAL_ASSESSMENT: 0-10

## 2025-01-29 ASSESSMENT — ACTIVITIES OF DAILY LIVING (ADL): ADL_ASSISTANCE: INDEPENDENT

## 2025-01-29 ASSESSMENT — PAIN DESCRIPTION - ORIENTATION
ORIENTATION: RIGHT

## 2025-01-29 NOTE — PROGRESS NOTES
01/29/25 1410   Discharge Planning   Living Arrangements Spouse/significant other   Support Systems Spouse/significant other   Type of Residence Private residence   Home or Post Acute Services None   Expected Discharge Disposition Home     Pt admitted for R shoulder arthroplasty. Pt lives with spouse and was independent PTA with no HHC or DME. Has shoulder immobilizer in place. PCP is Renata Coleman. Pt feels she is able to manage her health and understands her medications. Was able to drive and obtain meds. Therapy evals pending. Pt plans to return home with no new discharge needs. Family will provide transport.

## 2025-01-29 NOTE — PROGRESS NOTES
Physical Therapy    Physical Therapy Evaluation    Patient Name: Juju Adams  MRN: [id number]  Today's Date: 1/29/2025   Time Calculation  Start Time: 1138  Stop Time: 1150  Time Calculation (min): 12 min  4118/4118-A    Assessment/Plan   PT Assessment  Evaluation/Treatment Tolerance: Patient tolerated treatment well  Medical Staff Made Aware: Yes  End of Session Communication: Bedside nurse  Assessment Comment: Pt is independent with ambulation. No further inpatient skilled PT needs warranted; will sign off. Pt may benefit from outpatient PT once cleared by surgeon.   End of Session Patient Position:  (sitting EOB with OT present)  IP OR SWING BED PT PLAN  Inpatient or Swing Bed: Inpatient  PT Plan  PT Plan: PT Eval only  PT Eval Only Reason: Safe to return home  PT Frequency: PT eval only  PT Discharge Recommendations: No further acute PT (outpatient PT once cleared by surgeon)  PT Recommended Transfer Status: Assist x1  PT - OK to Discharge: Yes (To next level of care when cleared by medical team   )    Subjective       General Visit Information:  General  Reason for Referral: TSA  Referred By: Lorri Sung MD  Past Medical History Relevant to Rehab: Pt admitted 1/28/25 following completion of right TSA. PMH: asthma, HT, cardiac cath, bilateral IVELISSE, TKA, spinal stenosis  Family/Caregiver Present: No  Co-Treatment: OT  Co-Treatment Reason: for safety and discharge planning  Prior to Session Communication: Bedside nurse  Patient Position Received: Bed, 3 rail up, Alarm on  Preferred Learning Style: verbal  General Comment: Pt agreeable to PT, nursing cleared for treatment.    Home Living:  Home Living  Type of Home: House  Lives With: Significant other  Home Layout: One level  Home Access: Stairs to enter without rails  Entrance Stairs-Number of Steps: 2  Bathroom Shower/Tub: Tub/shower unit  Bathroom Equipment: Grab bars in shower  Home Living Comments: significant other will assist at  discharge    Prior Level of Function:  Prior Function Per Pt/Caregiver Report  ADL Assistance: Independent  Homemaking Assistance:  (SO manages)  Ambulatory Assistance: Independent    Precautions:  Precautions  UE Weight Bearing Status: Right Non-Weight Bearing  Medical Precautions: Fall precautions  Precautions Comment: No active movement of operative shoulder. Patient to remain in sling at all times unless with therapy. Patient can come out of the sling for active range of motion of elbow, wrist, hand and fingers with therapy only. NWB to operative extremity. For anatomic total shoulder replacement, PROM and AAROM for external rotation not passed 30 degrees.       Objective     Pain:  Pain Assessment  Pain Assessment: 0-10  0-10 (Numeric) Pain Score: 8  Pain Type: Surgical pain  Pain Location: Shoulder  Pain Orientation: Right  Pain Interventions: Ambulation/increased activity, Repositioned, Rest    Cognition:  Cognition  Overall Cognitive Status: Within Functional Limits  Orientation Level: Oriented X4    General Assessments:         Sensation  Sensation Comment: denies numbness and tingling              Static Sitting Balance  Static Sitting-Comment/Number of Minutes: good  Dynamic Sitting Balance  Dynamic Sitting-Comments: good  Static Standing Balance  Static Standing-Comment/Number of Minutes: good  Dynamic Standing Balance  Dynamic Standing-Comments: good    Functional Assessments:     Bed Mobility  Bed Mobility: Yes  Bed Mobility 1  Bed Mobility 1: Supine to sitting  Level of Assistance 1: Minimum assistance  Transfers  Transfer: Yes  Transfer 1  Transfer From 1: Sit to  Transfer to 1: Stand  Transfer Level of Assistance 1: Independent  Transfers 2  Transfer From 2: Stand to  Transfer to 2: Sit  Transfer Level of Assistance 2: Independent  Ambulation/Gait Training  Ambulation/Gait Training Performed: Yes  Ambulation/Gait Training 1  Assistance 1: Independent  Comments/Distance (ft) 1: 20 feet x 2           Extremity/Trunk Assessments:        RLE   RLE : Within Functional Limits  LLE   LLE : Within Functional Limits    Outcome Measures:     [facility] Basic Mobility  Turning from your back to your side while in a flat bed without using bedrails: None  Moving from lying on your back to sitting on the side of a flat bed without using bedrails: A little  Moving to and from bed to chair (including a wheelchair): None  Standing up from a chair using your arms (e.g. wheelchair or bedside chair): None  To walk in hospital room: None  Climbing 3-5 steps with railing: None  Basic Mobility - Total Score: 23                             Education Documentation  Precautions, taught by Pam Alvarez, PT at 1/29/2025  3:24 PM.  Learner: Patient  Readiness: Acceptance  Method: Explanation  Response: Verbalizes Understanding, Demonstrated Understanding    Body Mechanics, taught by Pam Alvarez, PT at 1/29/2025  3:24 PM.  Learner: Patient  Readiness: Acceptance  Method: Explanation  Response: Verbalizes Understanding, Demonstrated Understanding    Mobility Training, taught by Pam Alvarez, PT at 1/29/2025  3:24 PM.  Learner: Patient  Readiness: Acceptance  Method: Explanation  Response: Verbalizes Understanding, Demonstrated Understanding    Education Comments  No comments found.

## 2025-01-29 NOTE — DISCHARGE SUMMARY
Discharge Diagnosis  Primary osteoarthritis, right shoulder    Issues Requiring Follow-Up  Right shoulder surgery    Test Results Pending At Discharge  Pending Labs       No current pending labs.            Hospital Course   Patient underwent right reverse total shoulder arthroplasty on 1/28/2025.  She had an uneventful postoperative course including working with therapy and tolerating a diet.  She was discharged to home in satisfactory condition.    Pertinent Physical Exam At Time of Discharge  Physical Exam    Home Medications     Medication List      START taking these medications     acetaminophen 325 mg tablet; Commonly known as: Tylenol; Take 2 tablets   (650 mg) by mouth every 6 hours if needed for mild pain (1 - 3).   polyethylene glycol 17 gram packet; Commonly known as: Glycolax,   Miralax; Take 17 g by mouth once daily for 7 days.; Start taking on:   January 30, 2025     CONTINUE taking these medications     ascorbic acid 500 mg tablet; Commonly known as: Vitamin C; Take 1 tablet   (500 mg) by mouth once daily.   aspirin 81 mg EC tablet; Take 1 tablet (81 mg) by mouth once daily.   atorvastatin 40 mg tablet; Commonly known as: Lipitor; Take 2 tablets   (80 mg) by mouth once daily at bedtime.   carvedilol 25 mg tablet; Commonly known as: Coreg; Take 1 tablet (25 mg)   by mouth 2 times a day with meals.   cetirizine 10 mg tablet; Commonly known as: ZyrTEC; Take 1 tablet (10   mg) by mouth once daily.   chlorthalidone 25 mg tablet; Commonly known as: Hygroton; Take 1 tablet   (25 mg) by mouth once daily.   cholecalciferol 25 MCG (1000 UT) capsule; Commonly known as: Vitamin D-3   diphenhydrAMINE 25 mg tablet; Commonly known as: Sominex; Take 2 tablets   (50 mg) by mouth as needed at bedtime for sleep or allergies (Sinus   headache) for up to 10 days.   electrolytes, oral solution   famotidine 20 mg tablet; Commonly known as: Pepcid; Take 1 tablet (20   mg) by mouth 2 times a day.    fexofenadine-pseudoephedrine 180-240 mg 24 hr tablet; Commonly known as:   Allegra-D 24   fluticasone 50 mcg/actuation nasal spray; Commonly known as: Flonase;   Administer 1 spray into each nostril once daily. Shake gently. Before   first use, prime pump. After use, clean tip and replace cap.   Kody (with collagen) 7-7-1.5 gram powder in packet; Generic drug:   dxial-scxt-LrMFL-collag-mv-min   magnesium oxide 500 mg magnesium tablet   metoclopramide 10 mg tablet; Commonly known as: Reglan; Take 1 tablet   (10 mg) by mouth every 6 hours for 7 days.   omeprazole 40 mg DR capsule; Commonly known as: PriLOSEC; Take 1 capsule   (40 mg) by mouth once daily. Do not crush or chew.   potassium gluconate 595 mg (99 mg) tablet   sucralfate 1 gram tablet; Commonly known as: Carafate; Take 1 tablet (1   g) by mouth 4 times a day before meals.   SUMAtriptan 25 mg tablet; Commonly known as: Imitrex   topiramate 100 mg tablet; Commonly known as: Topamax   zinc gluconate 50 mg tablet     STOP taking these medications     chlorhexidine 0.12 % solution; Commonly known as: Peridex   glucosamine-chondroitin 500-400 mg tablet       Outpatient Follow-Up  No future appointments.    Caitlin Barnhart PA-C

## 2025-01-29 NOTE — PROGRESS NOTES
"Juju Adams is a 64 y.o. female on day 0 of admission presenting with Primary osteoarthritis, right shoulder.    Subjective   Patient is doing well status post a shoulder arthroplasty.  Patient's block is worn off overnight.  She can move her fingers.  She does have a reasonable amount of pain that is tolerable.  She has no nausea or vomiting.  Denies any chest pain or shortness of breath       Objective     Physical Exam    Sling is intact without any issues she can flex and extend the fingers.  Block looks like it is worn off.  Sensations intact she has no undue swelling sling and ice machine is all intact    Last Recorded Vitals  Blood pressure 112/63, pulse 67, temperature 36.4 °C (97.5 °F), temperature source Temporal, resp. rate 18, height 1.6 m (5' 3\"), weight 81.6 kg (180 lb), SpO2 98%.  Intake/Output last 3 Shifts:  I/O last 3 completed shifts:  In: 2093.3 (25.6 mL/kg) [I.V.:1093.3 (13.4 mL/kg); IV Piggyback:1000]  Out: 430 (5.3 mL/kg) [Urine:400 (0.1 mL/kg/hr); Blood:30]  Weight: 81.6 kg     Relevant Results      Scheduled medications  acetaminophen, 650 mg, oral, q6h GISELA  ketorolac, 15 mg, intravenous, q6h  polyethylene glycol, 17 g, oral, Daily      Continuous medications     PRN medications  PRN medications: benzocaine-menthol, bisacodyl, cyclobenzaprine, melatonin, morphine, naloxone, ondansetron **OR** ondansetron, oxyCODONE, oxyCODONE, sucralfate  Results for orders placed or performed during the hospital encounter of 01/28/25 (from the past 24 hours)   Basic metabolic panel   Result Value Ref Range    Glucose 132 (H) 74 - 99 mg/dL    Sodium 135 (L) 136 - 145 mmol/L    Potassium 4.0 3.5 - 5.3 mmol/L    Chloride 102 98 - 107 mmol/L    Bicarbonate 23 21 - 32 mmol/L    Anion Gap 14 10 - 20 mmol/L    Urea Nitrogen 16 6 - 23 mg/dL    Creatinine 0.56 0.50 - 1.05 mg/dL    eGFR >90 >60 mL/min/1.73m*2    Calcium 8.3 (L) 8.6 - 10.3 mg/dL   CBC POD 1   Result Value Ref Range    WBC 9.4 4.4 - 11.3 " x10*3/uL    nRBC 0.0 0.0 - 0.0 /100 WBCs    RBC 3.54 (L) 4.00 - 5.20 x10*6/uL    Hemoglobin 11.0 (L) 12.0 - 16.0 g/dL    Hematocrit 34.3 (L) 36.0 - 46.0 %    MCV 97 80 - 100 fL    MCH 31.1 26.0 - 34.0 pg    MCHC 32.1 32.0 - 36.0 g/dL    RDW 12.6 11.5 - 14.5 %    Platelets 210 150 - 450 x10*3/uL       Assessment/Plan   Assessment & Plan  Primary osteoarthritis, right shoulder    Status post shoulder arthroplasty.    Patient is doing well.  She would benefit from someone to turn over remove the sling and work with her elbow and wrist range of motion and pendulums.  Other than that follow-up in the office for the prescheduled appointment on the 10th would be appropriate.  Patient did have pain meds sent to her pharmacy but they have not been picked up.  If on discharge she could do beds to meds so that she does not have to pick it up that may be beneficial.  That information will be conveyed.       Lorri Sung MD

## 2025-01-29 NOTE — PROGRESS NOTES
Occupational Therapy    Evaluation    Patient Name: Juju Adams  MRN: [id number]  Today's Date: 1/29/2025  Time Calculation  Start Time: 1137  Stop Time: 1206  Time Calculation (min): 29 min  4118/4118-A  Eval only     Assessment  IP OT Assessment  Prognosis: Good  End of Session Communication: Bedside nurse  End of Session Patient Position: Bed, 3 rail up, Alarm on (left in care of RN)  Patient presents with decline in ADLs, functional transfers, functional mobility and would benefit from OT during acute stay to improve functional independence and safety. Recommend low intensity OT to maximize functional independence and safety.     Plan:  Treatment Interventions: ADL retraining, Functional transfer training, Patient/family training, Equipment evaluation/education, Compensatory technique education  OT Frequency: Daily  OT Discharge Recommendations: Low intensity level of continued care  OT - OK to Discharge: Yes from acute care OT services to the next level of care when cleared by medical team      Subjective   Current Problem:  1. Primary osteoarthritis, right shoulder  acetaminophen (Tylenol) 325 mg tablet    polyethylene glycol (Glycolax, Miralax) 17 gram packet          General:  General  Reason for Referral: right TSA  Referred By: Dr Sung  Past Medical History Relevant to Rehab: Admitted 1/28/2025 after having right TSA completed by Dr Sung.  PMH: CAD, OA, knee surgery, spinal stenosis, GERD, HLD, HTN  Co-Treatment: PT  Co-Treatment Reason: for safety  Prior to Session Communication: Bedside nurse  Patient Position Received: Bed, 3 rail up, Alarm on  Preferred Learning Style: verbal  General Comment: patient in long legged sitting in bed and agreeable to participate in OT evaluation    Precautions:  UE Weight Bearing Status: Right Non-Weight Bearing  Medical Precautions: Fall precautions  Precautions Comment: No active movement of operative shoulder. Patient to remain in sling at all times  unless with therapy. Patient can come out of the sling for active range of motion of elbow, wrist, hand and fingers with therapy only. NWB to operative extremity. For anatomic total shoulder replacement, PROM and AAROM for external rotation not passed 30 degrees.    Pain:  Pain Assessment  Pain Assessment: 0-10  0-10 (Numeric) Pain Score: 8  Pain Type: Surgical pain  Pain Location: Shoulder  Pain Orientation: Right  Pain Interventions: Rest    Objective   Cognition:  Overall Cognitive Status: Within Functional Limits  Orientation Level: Oriented X4    Home Living:  Home Living Comments: Lives in 1 story home with boyfriend with 2 ETHEL.  Has tub shower boone GB.     Prior Function:  Prior Function Comments: Was independent with all ADLs, IADLs, and functional mobility task without AD    ADL:  UE Dressing Assistance:  (Attempted to complete upper body dressing; however, patient reported feeling nauseated and requested to return to supine. Educated patient on comp strategies for upper body dressing with button down shirt. Patient verbalized understanding.)  LE Dressing Assistance: Minimal (don underwear and pants)  Educated patient on right UE precautions and ROM exercises per MD protocol.  Educated patient on compensatory strategies for upper body dressing and patient verbalized understanding and able to provide return demonstration.  Educated patient on pendulum exercises per MD protocol and patient able to verbalize understanding and provide return demonstration.  Educated patient on donning and doffing sling and patient able to verbalize understanding.    Activity Tolerance:  Endurance: Decreased tolerance for upright activites (limited secondary to nausea)    Bed Mobility/Transfers:   Bed Mobility  Bed Mobility:  (SBA supine <> sit)  Transfers  Transfer:  (SBA sit <> stand)    Extremities: RUE   RUE :  (right shoulder ROM not assessed, right elbow wrist and hand grossly WFL) and LUE   LUE: Within Functional  Limits    Outcome Measures: [facility] Daily Activity  Putting on and taking off regular lower body clothing: A little  Bathing (including washing, rinsing, drying): A little  Putting on and taking off regular upper body clothing: A lot  Toileting, which includes using toilet, bedpan or urinal: A little  Taking care of personal grooming such as brushing teeth: A little  Eating Meals: None  Daily Activity - Total Score: 18    EDUCATION:  Education  Individual(s) Educated: Patient  Education Provided: Fall precautons (comp strategies with upper body dressing, right UE exercises per MD protocol, NWB right UE, sling mgmt)  Patient Response to Education: Patient/Caregiver Verbalized Understanding of Information      Goals:   Encounter Problems       Encounter Problems (Active)       Dressing Upper Extremities       STG - Patient will dress upper body with min assist with comp strategies (Progressing)       Start:  01/29/25    Expected End:  02/12/25               Dressings Lower Extremities       STG - Patient will complete lower body dressing independently with comp strategies  (Progressing)       Start:  01/29/25    Expected End:  02/12/25               Functional Balance       STG-Patient will be indepenent with  dynamic stand task >5 minutes for ADL completion   (Progressing)       Start:  01/29/25    Expected End:  02/12/25               Functional Mobility       STG-Patient will be independent with functional mobility tasks   (Progressing)       Start:  01/29/25    Expected End:  02/12/25               OT Goals       STG-Patient will be independent with right UE ROM exercises per MD protocol  (Progressing)       Start:  01/29/25    Expected End:  02/12/25               OT Transfers       STG-Patient will be independent with functional transfers demonstrating good safety   (Progressing)       Start:  01/29/25    Expected End:  02/12/25

## 2025-01-30 VITALS
RESPIRATION RATE: 17 BRPM | WEIGHT: 180 LBS | HEIGHT: 63 IN | TEMPERATURE: 96.4 F | OXYGEN SATURATION: 95 % | DIASTOLIC BLOOD PRESSURE: 79 MMHG | SYSTOLIC BLOOD PRESSURE: 120 MMHG | BODY MASS INDEX: 31.89 KG/M2 | HEART RATE: 67 BPM

## 2025-01-30 NOTE — NURSING NOTE
End of shift note, no acute changes this shift, patient to be discharged tonight, patient boyfriend will be here to pick her up around 11pm tonight.  Patient unable to get in house otherwise doesn't have house keys and boyfriend is at work until 10pm in [facility]. Patient medicated with tylenol for pain. Patient worked with therapy this shift and tolerated well.

## 2025-01-30 NOTE — NURSING NOTE
2300 Discharge instructions reviewed with patient. Pt verbalizes understanding of instructions and has no complaints at this time.    7215 Pt taken down to exit with caregiver. Pt has all belongings with her. Pt transported to exit via wheelchair. No complaints at this time.

## 2025-02-24 DIAGNOSIS — R12 BURNING REFLUX: ICD-10-CM

## 2025-02-25 RX ORDER — OMEPRAZOLE 40 MG/1
40 CAPSULE, DELAYED RELEASE ORAL DAILY
Qty: 30 CAPSULE | Refills: 1 | Status: SHIPPED | OUTPATIENT
Start: 2025-02-25 | End: 2025-04-26

## 2025-02-25 NOTE — TELEPHONE ENCOUNTER
Received task for refill of omeprazole, had planned for patient to follow up with GI for further evaluation. Attempted to call patient but she did not answer, left message with instruction to make appointment with GI and contact our office with questions. Will send script to her pharmacy.

## 2025-03-04 ENCOUNTER — EVALUATION (OUTPATIENT)
Dept: OCCUPATIONAL THERAPY | Facility: HOSPITAL | Age: 65
End: 2025-03-04
Payer: COMMERCIAL

## 2025-03-04 DIAGNOSIS — M19.011 PRIMARY OSTEOARTHRITIS, RIGHT SHOULDER: Primary | ICD-10-CM

## 2025-03-04 PROCEDURE — 97165 OT EVAL LOW COMPLEX 30 MIN: CPT | Mod: GO

## 2025-03-04 PROCEDURE — 97110 THERAPEUTIC EXERCISES: CPT | Mod: GO

## 2025-03-04 ASSESSMENT — ENCOUNTER SYMPTOMS
OCCASIONAL FEELINGS OF UNSTEADINESS: 0
DEPRESSION: 0
LOSS OF SENSATION IN FEET: 0

## 2025-03-04 NOTE — PROGRESS NOTES
Occupational Therapy Evaluation    Patient Name: Juju Adams  MRN: [id number]  Today's Date: 3/4/2025  Time Calculation  Start Time: 0200  Stop Time: 0245  Time Calculation (min): 45 min  Problem List Items Addressed This Visit             ICD-10-CM    Primary osteoarthritis, right shoulder - Primary M19.011    Relevant Orders    Follow Up In Occupational Therapy      DOI 1/28/25 (5 weeks p/o)    Insurance  Visit 1  of  Authorization: required  Insurance plan: Payor:  EMPLOYEE MEDICAL PLAN / Plan:  EMPLOYEE MEDICAL PLAN CONSUMER [employer] / Product Type: *No Product type* /     Assessment: Juju Adams is a 63 yo   who presents 5 weeks s/p R shoulder hemiarthroplasty and rotator cuff repair on 1/28/25. She reports moderate pain as well as difficulty using her dominant hand/arm. She demonstrates limited motion, as expected. I got her started on a home program that addresses these issues. She will benefit from ongoing skilled occupational therapy in order to get back to safe I/ADL performance and her prior level of function, including returning to work.     Plan: therapeutic exercise, therapeutic activity, self-care, home management, manual therapy, neuromuscular coordination, vasopneumatic, electric stimulation, fluidotherapy, ultrasound, kinesiotaping, orthosis fabrication, wound/scar/edema care  Goals  In 8-10 weeks, Juju will achieve the following goals:  She will be able to perform self-care, household, work, and leisure tasks with lower pain (1-3/10), 75% of the time.  She will improve right shoulder AROM in order to fully perform self-care, household, work and leisure tasks:    Flexion/Abduction 120 deg, Internal/External Rotation T10/60 deg.   She will regain 4+/5 to 5/5 right shoulder strength in order to fully perform self-care, household, work, and leisure tasks.  She will be ready to return to work when released by her surgeon or me.  She will decrease her  QuickDASH score by 20-30 points (33/50 at [facility]).  Patient's goals for therapy: regain full use of RUE    Plan of care was developed with input and agreement by the patient  Frequency: 1 x Week  Duration: 12 Weeks    Precautions:  Movement Restrictions: Yes: No active R shoulder motion  Weight Bearing Status:  NWB    Subjective   Presented in sling. Uses ice machine that she bought from the surgeon's group office for ~$200. Bandage came off as she was showering. Doing pendulums, elbow flex/ext, finger gripping/extension. Lives with longterm boyfriend who does everything for her. Likes being at work. Has a vibration platform that she stands on for 15 min for weight loss, which is like 72882 steps, asks if it's OK now (yes).  She has seen the surgeon at [facility] in [place]. She was told she should wear the sling out of the house and at night only.    Prior level of function   WNL until 1/3/25 when she was hit with pain reaching up with RUE.     Patient Intake and Medical History form reviewed    Pain  3/10; less than prior to surgery; in posterior shoulder, elbow; stiff hand in morning  taking OTC tylenol, has 5 meloxicam's left, no more oxy    Objective   Outcome Measure: QuickDASH: 33/50    Wound/scar: well-healed with scabs around it; instructed her in scar massage    Edema: mild per report    Sensation  WNL     AROM  Shoulder:   flexion 62  extension 45   abduction 73  external rotation 53    Elbow/forearm/wrist/hand:  WNL      Strength TBE  SHOULDER STRENGTH (MMT)  Flexion R L   Flexion     Extension     Abduction     IR at 0 degrees abd     ER at 0 degrees abd     IR at 90 degrees abd     Er at 90 degrees abd        Treatment  Education: home exercise program, plan of care, activity modification, pain management, and pertinent anatomy     Modalities: Moist heat to R shoulder and thorax in sitting to prepare for treatment and for pain relief.  Therapeutic Exercise: Juju performed pendulums  independently and correctly. Instructed her in scar massage, use of moist heat, and active scapular motion (elevation, depression, retraction), active cervical motion (cervical rotation to the left and right, lateral cervical flexion to left and right, cervical extension and flexion, and chin tucks), and passive shoulder stretching (LUE doing the motion) in supine: chest press, flexion, and abduction, which she performed correctly x 10 each. She will do 2 sets of 10 of each 2-3x/day.        OT Evaluation Time Entry  OT Evaluation (Low) Time Entry: 30  OT Therapeutic Procedures Time Entry  Therapeutic Exercise Time Entry: 15           Date of Evaluation: 03/04/25    Onset Date: 1/28/25    Referring Physician: Lorri Sung    Surgery in the Last 3 months:  yes    CPT Codes: Therapeutic Exercise: 95791, Therapeutic Activity: 42782, Neuromuscular Re-Education: 23887, Manual Therapy: 66569, and Electric Stimulation (Unattended): 24424    Diagnosis:   M19.011 primary osteoarthritis right shoulder    Functional Outcome:  Quick Dash 50    OT / PT Evaluation complexity:  low    Which of the following best describes the primary reason of therapy: Improving, restoring, or adapting functional mobility or skills    Visits Requested: 20    Date Range: 03/04/25 - 07/22/25    Frequency:  1 times per week    Duration:  20 weeks    Select all conditions that apply: None of these apply     Social Determinants of health:  N/A      Lenora Jacinto, OT

## 2025-03-14 ENCOUNTER — TREATMENT (OUTPATIENT)
Dept: OCCUPATIONAL THERAPY | Facility: HOSPITAL | Age: 65
End: 2025-03-14
Payer: COMMERCIAL

## 2025-03-14 DIAGNOSIS — M19.011 PRIMARY OSTEOARTHRITIS, RIGHT SHOULDER: ICD-10-CM

## 2025-03-14 PROCEDURE — 97110 THERAPEUTIC EXERCISES: CPT | Mod: GO

## 2025-03-14 NOTE — PROGRESS NOTES
Occupational Therapy Treatment    Patient Name: Juju Adams  MRN: [id number]  Today's Date: 3/14/2025  Time Calculation  Start Time: 0900  Stop Time: 0945  Time Calculation (min): 45 min  Problem List Items Addressed This Visit             ICD-10-CM    Primary osteoarthritis, right shoulder M19.011      DOI 1/28/25 (6 weeks p/o)    Insurance  Visit 2 of 12 until 5/31/25  Authorization: required  Insurance plan: Payor:  EMPLOYEE MEDICAL PLAN / Plan:  EMPLOYEE MEDICAL PLAN CONSUMER [employer] / Product Type: *No Product type* /     Assessment: Juju Adams is doing well, has made gains in all R shoulder motions     Plan: therapeutic exercise, therapeutic activity, self-care, home management, manual therapy, neuromuscular coordination, vasopneumatic, electric stimulation, fluidotherapy, ultrasound, kinesiotaping, orthosis fabrication, wound/scar/edema care  Goals  In 8-10 weeks, Juju will achieve the following goals:  She will be able to perform self-care, household, work, and leisure tasks with lower pain (1-3/10), 75% of the time.  She will improve right shoulder AROM in order to fully perform self-care, household, work and leisure tasks:    Flexion/Abduction 120 deg, Internal/External Rotation T10/60 deg.   She will regain 4+/5 to 5/5 right shoulder strength in order to fully perform self-care, household, work, and leisure tasks.  She will be ready to return to work when released by her surgeon or me.  She will decrease her QuickDASH score by 20-30 points (33/50 at eval).  Patient's goals for therapy: regain full use of RUE    Plan of care was developed with input and agreement by the patient  Frequency: 1 x Week  Duration: 12 Weeks    Precautions:  Movement Restrictions: Yes: hemiarthroplasty and RCR guidelines  Weight Bearing Status:  NWB    Subjective  DOS 1/28/25 6 weeks p/o  Has copper compression glove that heats. Wears sling at night only. More pain in shoulder and hand.    She sees the  surgeon at [facility] in [place], Dr. Lorri Sung.     Prior level of function   WNL until 1/3/25 when she was hit with pain reaching up with RUE.     Patient Intake and Medical History form reviewed    Pain  6/10; in anterior shoulder, posterior upper arm, stiff painful hand in morning  taking OTC tylenol, meloxicam    Objective   Outcome Measure: QuickDASH: 33/50    Wound/scar: well-healed mildly adherent    Sensation  WNL     AROM  Shoulder:   flexion 76  extension 61   abduction 77  external rotation 64  Internal rotation to stomach    Elbow/forearm/wrist/hand:  WNL    Strength TBE  SHOULDER (MMT)  Flexion R L   Flexion     Extension     Abduction     IR at 0 degrees abd     ER at 0 degrees abd     IR at 90 degrees abd     Er at 90 degrees abd        Treatment  Education: home exercise program, plan of care, activity modification, pain management, and pertinent anatomy     Therapeutic Exercise: Reassessed AROM, scar, symptoms, functional status, HEP and compliance, Pulley for shoulder stretching in shoulder flexion, abduction x 10 each. Pendulums x 10. Instructed her in A/AA shoulder flexion to 90 degrees without shoulder hiking (to be done in front of a mirror), and active IR/ER with elbow at her side, which she performed correctly x 10 each; all added to HEP.     HEP: scar massage, use of heat, pendulums, active scap motion, active cervical motion, passive shoulder stretching in supine,   2 sets of 10 of each 2-3x/day.        OT Therapeutic Procedures Time Entry  Therapeutic Exercise Time Entry: 45

## 2025-03-18 ENCOUNTER — OFFICE VISIT (OUTPATIENT)
Dept: PRIMARY CARE | Facility: HOSPITAL | Age: 65
End: 2025-03-18
Payer: COMMERCIAL

## 2025-03-18 VITALS
WEIGHT: 193.1 LBS | TEMPERATURE: 97.7 F | BODY MASS INDEX: 34.21 KG/M2 | HEART RATE: 79 BPM | HEIGHT: 63 IN | OXYGEN SATURATION: 97 % | DIASTOLIC BLOOD PRESSURE: 73 MMHG | SYSTOLIC BLOOD PRESSURE: 115 MMHG

## 2025-03-18 DIAGNOSIS — K21.9 CHRONIC GERD: ICD-10-CM

## 2025-03-18 DIAGNOSIS — I25.10 CORONARY ARTERY DISEASE INVOLVING NATIVE CORONARY ARTERY OF NATIVE HEART, UNSPECIFIED WHETHER ANGINA PRESENT: ICD-10-CM

## 2025-03-18 DIAGNOSIS — M79.89 LEG SWELLING: ICD-10-CM

## 2025-03-18 DIAGNOSIS — I25.10 CORONARY ARTERY DISEASE INVOLVING NATIVE CORONARY ARTERY OF NATIVE HEART WITHOUT ANGINA PECTORIS: ICD-10-CM

## 2025-03-18 DIAGNOSIS — E78.2 MIXED HYPERLIPIDEMIA: ICD-10-CM

## 2025-03-18 DIAGNOSIS — E87.6 HYPOKALEMIA: Primary | ICD-10-CM

## 2025-03-18 DIAGNOSIS — E54 VITAMIN C DEFICIENCY: ICD-10-CM

## 2025-03-18 DIAGNOSIS — G43.909 MIGRAINE WITHOUT STATUS MIGRAINOSUS, NOT INTRACTABLE, UNSPECIFIED MIGRAINE TYPE: ICD-10-CM

## 2025-03-18 DIAGNOSIS — M15.0 PRIMARY OSTEOARTHRITIS INVOLVING MULTIPLE JOINTS: ICD-10-CM

## 2025-03-18 DIAGNOSIS — R51.9 SINUS HEADACHE: ICD-10-CM

## 2025-03-18 DIAGNOSIS — D64.9 ANEMIA, UNSPECIFIED TYPE: ICD-10-CM

## 2025-03-18 DIAGNOSIS — K21.9 GASTROESOPHAGEAL REFLUX DISEASE, UNSPECIFIED WHETHER ESOPHAGITIS PRESENT: ICD-10-CM

## 2025-03-18 DIAGNOSIS — B34.9 VIRAL SYNDROME: ICD-10-CM

## 2025-03-18 DIAGNOSIS — R12 BURNING REFLUX: ICD-10-CM

## 2025-03-18 DIAGNOSIS — J01.11 ACUTE RECURRENT FRONTAL SINUSITIS: ICD-10-CM

## 2025-03-18 PROCEDURE — 1125F AMNT PAIN NOTED PAIN PRSNT: CPT

## 2025-03-18 PROCEDURE — 3078F DIAST BP <80 MM HG: CPT

## 2025-03-18 PROCEDURE — 1036F TOBACCO NON-USER: CPT

## 2025-03-18 PROCEDURE — 99214 OFFICE O/P EST MOD 30 MIN: CPT

## 2025-03-18 PROCEDURE — 1157F ADVNC CARE PLAN IN RCRD: CPT

## 2025-03-18 PROCEDURE — 1159F MED LIST DOCD IN RCRD: CPT

## 2025-03-18 PROCEDURE — 3074F SYST BP LT 130 MM HG: CPT

## 2025-03-18 PROCEDURE — 3008F BODY MASS INDEX DOCD: CPT

## 2025-03-18 PROCEDURE — 99214 OFFICE O/P EST MOD 30 MIN: CPT | Mod: GC

## 2025-03-18 RX ORDER — FAMOTIDINE 20 MG/1
20 TABLET, FILM COATED ORAL 2 TIMES DAILY
Qty: 180 TABLET | Refills: 1 | Status: SHIPPED | OUTPATIENT
Start: 2025-03-18

## 2025-03-18 RX ORDER — EZETIMIBE 10 MG/1
10 TABLET ORAL DAILY
Qty: 30 TABLET | Refills: 5 | Status: SHIPPED | OUTPATIENT
Start: 2025-03-18 | End: 2025-09-14

## 2025-03-18 RX ORDER — FEXOFENADINE HCL AND PSEUDOEPHEDRINE HCL 180; 240 MG/1; MG/1
1 TABLET, EXTENDED RELEASE ORAL DAILY PRN
Qty: 30 TABLET | Refills: 2 | Status: SHIPPED | OUTPATIENT
Start: 2025-03-18

## 2025-03-18 RX ORDER — BACLOFEN 20 MG
1 TABLET ORAL DAILY
Qty: 30 TABLET | Refills: 11 | Status: SHIPPED | OUTPATIENT
Start: 2025-03-18

## 2025-03-18 RX ORDER — CARVEDILOL 25 MG/1
25 TABLET ORAL
Qty: 180 TABLET | Refills: 3 | Status: SHIPPED | OUTPATIENT
Start: 2025-03-18

## 2025-03-18 RX ORDER — MAGNESIUM HYDROXIDE 400 MG/5ML
1 SUSPENSION, ORAL (FINAL DOSE FORM) ORAL DAILY
Qty: 90 TABLET | Refills: 3 | Status: SHIPPED | OUTPATIENT
Start: 2025-03-18

## 2025-03-18 RX ORDER — OMEPRAZOLE 40 MG/1
40 CAPSULE, DELAYED RELEASE ORAL DAILY
Qty: 30 CAPSULE | Refills: 1 | Status: SHIPPED | OUTPATIENT
Start: 2025-03-18

## 2025-03-18 RX ORDER — SUCRALFATE 1 G/1
1 TABLET ORAL
Qty: 360 TABLET | Refills: 3 | Status: SHIPPED | OUTPATIENT
Start: 2025-03-18

## 2025-03-18 RX ORDER — ATORVASTATIN CALCIUM 40 MG/1
80 TABLET, FILM COATED ORAL NIGHTLY
Qty: 180 TABLET | Refills: 3 | Status: SHIPPED | OUTPATIENT
Start: 2025-03-18

## 2025-03-18 RX ORDER — VIT C/E/ZN/COPPR/LUTEIN/ZEAXAN 250MG-90MG
25 CAPSULE ORAL DAILY
Qty: 30 CAPSULE | Refills: 11 | Status: SHIPPED | OUTPATIENT
Start: 2025-03-18

## 2025-03-18 RX ORDER — ASPIRIN 81 MG/1
81 TABLET ORAL DAILY
Qty: 90 TABLET | Refills: 3 | Status: SHIPPED | OUTPATIENT
Start: 2025-03-18

## 2025-03-18 RX ORDER — SUMATRIPTAN SUCCINATE 25 MG/1
25 TABLET ORAL DAILY PRN
Qty: 9 TABLET | Refills: 11 | Status: SHIPPED | OUTPATIENT
Start: 2025-03-18

## 2025-03-18 RX ORDER — ASCORBIC ACID 500 MG
500 TABLET ORAL DAILY
Qty: 90 TABLET | Refills: 3 | Status: SHIPPED | OUTPATIENT
Start: 2025-03-18

## 2025-03-18 ASSESSMENT — ENCOUNTER SYMPTOMS
DEPRESSION: 0
LOSS OF SENSATION IN FEET: 0
OCCASIONAL FEELINGS OF UNSTEADINESS: 0

## 2025-03-18 ASSESSMENT — PAIN SCALES - GENERAL: PAINLEVEL_OUTOF10: 6

## 2025-03-18 NOTE — PROGRESS NOTES
I reviewed the resident/fellow's documentation and discussed the patient with the resident/fellow. I agree with the resident/fellow's medical decision making as documented in the note.    Case d/w Dr. Osuna; agree with his A/P. I have personally reviewed the plan with the pt as well. She's motivated to work on weight loss and to increase her phys activity. Would decrease Chlorthalidone to 12.5mg daily given hypokalemia and start Zetia. Goal LDL should be <70 given h/o CAD. Would update chem 7, LFTs and lipids in 8 weeks.    Sue Vogel MD

## 2025-03-18 NOTE — PROGRESS NOTES
Chief complaint: follow-up    HPI:  Juju Adams is a 64 y.o. female with pmh of pmhx of hypertension, HLD, CAD (on ASA 81), migraine headaches, mild persistent asthma, renal cyst, and GERD presenting for 2 month follow-up    Patient underwent right reverse total shoulder arthroplasty on 1/28/2025. She had an uneventful postoperative course including working with therapy and tolerating a diet. She was discharged to home in satisfactory condition.   She just started PT the week before last - she has been going once a week to PT and feels like this has been a good challenge and has been gaining back ROM in her R shoulder slowly. However, she still has significantly reduced ROM and she would like to do PT more often.    Her main complaint today is her weight gain which has been causing upper back pain. 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  Diet: has been eating more plant based vegan -- eggwhite veggie omelette or special K cereal, no lunch, dinner is a chicken sandwich/deli/rotisserie chicken or spaghetti squash -- I warned her to stay away from heavily processed plant based foods. She sees a dietician/nutritionist who I encouraged her to continue with  Activity: sedentary - lives in [place] so it is too dangerous to walk in her area. She is open to taking the bus to [place] or [place] to start walking more.    BP: hasn't been checking BP at home, was controlled in clinic today 115/73. She has cramps and requires electolyte supplements from her chlorthalidone, but she would not like to change medications. No migraines recently, denies CP, SOB, cough, fever, N/V/D.    Medications:  Current Outpatient Medications   Medication Instructions    wpads-avyh-SlVRU-collag-mv-min (Kody, with collagen,) 7-7-1.5 gram powder in packet 2 Doses, Daily    ascorbic acid (VITAMIN C) 500 mg, oral, Daily    aspirin 81 mg, oral,  Daily    atorvastatin (LIPITOR) 80 mg, oral, Nightly    carvedilol (Coreg) 25 mg tablet Take 1 tablet (25 mg) by mouth 2 times a day with meals.    cetirizine (ZYRTEC) 10 mg, oral, Daily    chlorthalidone (HYGROTON) 25 mg, oral, Daily    cholecalciferol (VITAMIN D-3) 25 mcg, Daily    diphenhydrAMINE (SOMINEX) 50 mg, oral, Nightly PRN    electrolytes, oral solution 1 Dose, Daily    famotidine (PEPCID) 20 mg, oral, 2 times daily    fexofenadine-pseudoephedrine (Allegra-D 24) 180-240 mg 24 hr tablet 1 tablet, Daily PRN    fluticasone (Flonase) 50 mcg/actuation nasal spray 1 spray, Each Nostril, Daily, Shake gently. Before first use, prime pump. After use, clean tip and replace cap.    magnesium oxide 500 mg magnesium tablet 1 tablet, Daily    metoclopramide (REGLAN) 10 mg, oral, Every 6 hours    omeprazole (PRILOSEC) 40 mg, oral, Daily, Do not crush or chew.    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    Coronary artery disease     GERD (gastroesophageal reflux disease)     Hyperlipidemia     Hypertension     Migraines     Other specified cough 02/18/2019    Post-viral cough syndrome    Personal history of other diseases of the respiratory system 12/26/2017    History of acute bronchitis with bronchospasm    Personal history of other diseases of the respiratory system 07/31/2018    History of acute sinusitis    Personal history of other drug therapy 11/01/2016    History of influenza  vaccination    Personal history of other infectious and parasitic diseases 04/13/2015    History of herpes zoster    Vision loss        Surgical history:  Past Surgical History:   Procedure Laterality Date    CARDIAC CATHETERIZATION      COLONOSCOPY      TOTAL HIP ARTHROPLASTY Bilateral 02/13/2014    Total Hip Replacement    TOTAL KNEE ARTHROPLASTY Left     TUBAL LIGATION  01/04/2018    Tubal Ligation    UPPER GASTROINTESTINAL ENDOSCOPY         Family history:  Family History   Problem Relation Name Age of Onset    Hypertension Mother      Heart attack Mother      Hypertension Father      Heart attack Father      Stroke Father      Diabetes Sister      Hypertension Sister      Seizures Sister         Social history:   reports that she quit smoking about 12 years ago. Her smoking use included cigarettes. She has been exposed to tobacco smoke. She has never used smokeless tobacco. She reports that she does not currently use drugs. She reports that she does not drink alcohol.    Health maintenance:  Health Maintenance   Topic Date Due    Yearly Adult Physical  Never done    Bone Density Scan  Never done    MMR Vaccines (1 of 1 - Standard series) Never done    Hepatitis C Screening  Never done    DTaP/Tdap/Td Vaccines (1 - Tdap) Never done    Zoster Vaccines (1 of 2) Never done    RSV High Risk: (Elderly (60+) or Pregnant Population) (1 - Risk 60-74 years 1-dose series) Never done    Pneumococcal Vaccine (2 of 2 - PCV) 10/06/2023    Influenza Vaccine (1) 09/01/2024    COVID-19 Vaccine (6 - 2024-25 season) 09/01/2024    Mammogram  08/08/2025    Diabetes Screening  01/22/2026    Cervical Cancer Screening  11/08/2028    Lipid Panel  08/01/2029    Colorectal Cancer Screening  11/30/2033    HIB Vaccines  Aged Out    Hepatitis B Vaccines  Aged Out    IPV Vaccines  Aged Out    Hepatitis A Vaccines  Aged Out    Meningococcal Vaccine  Aged Out    Rotavirus Vaccines  Aged Out    HPV Vaccines  Aged Out    Irritable Bowel Syndrome   "Discontinued     Review of systems:  Negative other than noted above    Vitals:  Vitals:    03/18/25 1353   BP: 115/73   Pulse: 79   Temp: 36.5 °C (97.7 °F)   SpO2: 97%       Physical exam:  Physical Exam  Eyes:      Extraocular Movements: Extraocular movements intact.   Cardiovascular:      Rate and Rhythm: Normal rate and regular rhythm.      Heart sounds: Normal heart sounds.   Pulmonary:      Effort: Pulmonary effort is normal.      Breath sounds: Normal breath sounds. No wheezing or rales.   Chest:      Chest wall: No tenderness.   Abdominal:      Tenderness: There is no abdominal tenderness. There is no guarding or rebound.   Musculoskeletal:      Comments: BL LE w/ trace pitting, but mostly non-pitting.   Neurological:      General: No focal deficit present.      Mental Status: She is alert. Mental status is at baseline.   Psychiatric:         Mood and Affect: Mood normal.         Behavior: Behavior normal.         Thought Content: Thought content normal.         Judgment: Judgment normal.         Labs:  Lab Results   Component Value Date    WBC 9.4 01/29/2025    HGB 11.0 (L) 01/29/2025    HCT 34.3 (L) 01/29/2025    MCV 97 01/29/2025     01/29/2025       Lab Results   Component Value Date    GLUCOSE 132 (H) 01/29/2025    CALCIUM 8.3 (L) 01/29/2025     (L) 01/29/2025    K 4.0 01/29/2025    CO2 23 01/29/2025     01/29/2025    BUN 16 01/29/2025    CREATININE 0.56 01/29/2025       Lab Results   Component Value Date    HGBA1C 5.4 01/22/2025        Lab Results   Component Value Date    CHOL 182 08/01/2024    CHOL 241 (H) 09/13/2023    CHOL 210 (H) 03/19/2021     Lab Results   Component Value Date    HDL 58.3 08/01/2024    HDL 67.6 09/13/2023    HDL 55.7 03/19/2021     Lab Results   Component Value Date    LDLCALC 108 (H) 08/01/2024     Lab Results   Component Value Date    TRIG 81 08/01/2024    TRIG 107 09/13/2023    TRIG 93 03/19/2021     No components found for: \"CHOLHDL\"    Imaging:  No " results found.    Assessment and plan:  Juju Adams is a 64 y.o. female with pmh of pmhx of hypertension, HLD, CAD (on ASA 81), migraine headaches, mild persistent asthma, renal cyst, and GERD presenting for 2 month follow-up    Plan:  -Weight loss: goal to lose 10 lb in next 6 months   -Diet: decrease processed foods (impossible burgers, deli meat), otherwise doing great   -Activity: sedentary. Plan will be the following: start walking at a park, in [place], or on treadmill at least 20 minutes per day for 2-3 days a week for the next couple weeks, then 4 days a week   -Educated on pharmacological options vs bariatric surgery - pt is not interested currently, prefers lifestyle changes  - in 8/2024, not at goal < 70. In addition to lifestyle modifications, will start ezetimibe 10 mg daily and recheck lipids in 2 months  -Leg cramping and elyte depletion likely from chlorthalidone - decrease chlorthalidone to 15 mg daily, obtain CMP, Mg, Phos in 2 months prior to follow-up. Maintain weekly BP log. Consider stopping chlorthalidone and potassium supplement next visit if BP log looks good.  -CBC in 2 months to follow-up post-procedural anemia   -Encouraged to schedule GI and DEXA appts    # HTN  #Leg cramping  #Iatrogenic electrolyte depletion (hypokalemia)  -/73 today  -Leg cramping and elyte depletion likely from chlorthalidone   Plan:  -Continue coreg 25 BID  -decrease chlorthalidone to 15 mg daily  - obtain CMP, Mg, Phos in 2 months prior to follow-up. Maintain weekly BP log. Consider stopping chlorthalidone and potassium supplement next visit if BP log looks good.    #CAD  #HLD  - Last lipid 8/2024: HDL 58, , TG 81  - C 3/2013: mild diffuse coronary disease, normal LV pressures  - Stress echo (3/2021): resting EF 60-65%, stress EF > 75%, normal stress echo  - CTA 2/21/24 with mild-moderate RCA and LAD stenosis and CT score of 394  -Follows with cardiology, last seen 3/2024, previously  had very rare chest pain relieved by nitroglycerin but deemed likely noncardiac; denies recent CP  Plan:  - On atorva 80/aspirin 81  -Start ezetimibe 10 mg daily - goal LDL < 70 given CAD hx   -obtain lipid panel in 2 months    # Miner's Esophagus  # Gastritis  # GERD  - Re-order omeprazole 40 mg every day  - Re-order Pepcid and sucralfate  - Fu with GI in 3 months to evaluate symptoms     HEALTH MAINTENANCE   Last A1c 5.4% 1/2025  Antibody Testing   HIV: negative never checked  Syphilis: never checked   Hepatitis C: never checked   Vaccines  Influenza: NA  Shingles: discuss at next visit >49yo 2 doses 2-6mo apart  Tdap: discuss at next visit  Pneumonia: plan for next visit adult <65 w/ risk factors (heart/liver/lung disease; smoking, diabetes) OR >65 unknown vaccine hx PCV 20 -> 1yr -> PPSV 23; PPSV23 -> 1yr -> PCV 20   COVID: will ask at next visit  Imaging Screening   Osteoporosis/DEXA screening: Referral placed   Cancer Screening  Cervical Cancer Screening: last pap smear/HPV testing in 2023 and wnl  Mammography: Done 8/2024, recommend yearly   Colorectal Cancer Screening: Done in 2023, repeat in 7 years per report (2030)    Patient and plan discussed with attending physician Dr. Vogel.    Chelita Osuna MD

## 2025-03-18 NOTE — PATIENT INSTRUCTIONS
As discussed today, our plan is:     Labs - we ordered labs for May 2025 - you can get these done at any  lab. We will call you with any abnormal results. If you have any questions or concerns prior to us calling feel free to call our office to have your questions addressed.   Medication changes: start ezetimibe 10 mg daily for cholesterol lowering, decrease chlorthalidone to 15 mg daily, only take the liquid IV as needed for cramping as this can increase you blood pressure. Please record weekly blood pressures and bring a log of these blood pressures to your next appointment in a few months.   Orders - you should see the following specialists: Gastroenterology, and obtain DEXA. Please call [phone] to schedule your appointment.   4.   If you smoke or use other tobacco products, take steps to quit. Call [phone] for more information or to set up an appointment with  [facility]. The [place] Tobacco Quit Line is a free resource for people who don’t have insurance, receive Medicaid, pregnant women, or members of the [place] [facility]. Call 5-105-QUIT-NOW or 1-554.134.2406.    Please come back to see us in: 3 months   ------  If you have any problems or questions, please contact the clinic at [phone] to leave a message. Our fax number is [phone]. If your issue cannot wait until the next business day, please go to urgent care or the emergency department.     I also strongly urge all of my patients to register for [facility] by going to: [email or web address]  (The  staff can also send you a text/email link to register when you check out).    No shows: It is understandable if you are unable to make it to a visit, but please cancel your appointment instead of not showing up. This helps to give other patients access to primary care and keeps wait times low.        [facility]   [phone]

## 2025-03-21 ENCOUNTER — TREATMENT (OUTPATIENT)
Dept: OCCUPATIONAL THERAPY | Facility: HOSPITAL | Age: 65
End: 2025-03-21
Payer: COMMERCIAL

## 2025-03-21 DIAGNOSIS — M19.011 PRIMARY OSTEOARTHRITIS, RIGHT SHOULDER: ICD-10-CM

## 2025-03-21 PROCEDURE — 97110 THERAPEUTIC EXERCISES: CPT | Mod: GO

## 2025-03-21 NOTE — PROGRESS NOTES
Occupational Therapy Treatment    Patient Name: Juju Adams  MRN: [id number]  Today's Date: 3/21/2025  Time Calculation  Start Time: 0945  Stop Time: 1025  Time Calculation (min): 40 min  Problem List Items Addressed This Visit             ICD-10-CM    Primary osteoarthritis, right shoulder M19.011      DOI 1/28/25 (7 weeks p/o)    Insurance  Visit 3  of 12 until 5/31/25  Authorization: required  Insurance plan: Payor:  EMPLOYEE MEDICAL PLAN / Plan:  EMPLOYEE [employer]ICAL PLAN [employer] / Product Type: *No Product type* /     Assessment: Juju Adams is doing well, again made gains in all R shoulder motions     Plan: isometric shoulder strengthening; therapeutic exercise, therapeutic activity, self-care, home management, manual therapy, neuromuscular coordination, vasopneumatic, electric stimulation, fluidotherapy, ultrasound, kinesiotaping, orthosis fabrication, wound/scar/edema care  Goals  In 8-10 weeks, Juju will achieve the following goals:  She will be able to perform self-care, household, work, and leisure tasks with lower pain (1-3/10), 75% of the time.  She will improve right shoulder AROM in order to fully perform self-care, household, work and leisure tasks:    Flexion/Abduction 120 deg, Internal/External Rotation T10/60 deg.   She will regain 4+/5 to 5/5 right shoulder strength in order to fully perform self-care, household, work, and leisure tasks.  She will be ready to return to work when released by her surgeon or me.  She will decrease her QuickDASH score by 20-30 points (33/50 at eval).  Patient's goals for therapy: regain full use of RUE    Plan of care was developed with input and agreement by the patient  Frequency: 1 x Week  Duration: 12 Weeks    Precautions:  Movement Restrictions: Yes: hemiarthroplasty and RCR guidelines  Weight Bearing Status:  NWB    Subjective  DOS 1/28/25  7 weeks p/o  Keeps heating pad on for 2 hours. Wants to use ice again (I recommended 10-15  min). R hand no longer stiff upon waking.    She sees the surgeon at [facility] in [place], Dr. Lorri Sung.     Prior level of function   WNL until 1/3/25 when she was hit with pain reaching up with RUE.     Patient Intake and Medical History form reviewed    Pain  4/10; in anterior shoulder, posterior upper arm  taking OTC tylenol, meloxicam    Objective   Outcome Measure: QuickDASH: 33/50    Wound/scar: well-healed mildly adherent    Sensation  WNL     AROM  Shoulder:   flexion 111  extension 63   abduction 90  external rotation 72  Internal rotation to stomach    Elbow/forearm/wrist/hand:  WNL    Strength TBE  SHOULDER (MMT)  Flexion R L   Flexion     Extension     Abduction     IR at 0 degrees abd     ER at 0 degrees abd     IR at 90 degrees abd     Er at 90 degrees abd        Treatment  Education: home exercise program, plan of care, activity modification, pain management, and pertinent anatomy     Therapeutic Exercise: Reassessed AROM, scar, symptoms, functional status, HEP and compliance. UBE for gripping and general UE strengthening, level 1 x 2 min in each direction. Instructed her in active shoulder abduction working up to 90 degrees without shoulder hiking (to be done in front of a mirror and with scapulae against the wall), and passive shoulder flexion and abduction stretching on countertop or table, which she performed correctly x 10 each; all added to HEP. Applied 1 I-strip of Kinesiotape with 50% tension from lower medial border of the L scapula to the AC joint to correct forward shoulder posture, 2 I-strips of Kinesiotape with 50% tension from the AC jt surrounding the humeral head (1 anteriorly and the other posteriorly) and ending at the middle deltoid insertion to support the shoulder, and instructed her in its wear/care/removal.     HEP: scar massage, use of heat, pendulums, active scap motion, active cervical motion, passive shoulder flexion stretching in supine, A/AA shoulder  flexion to 90 degrees and active shoulder abduction working up to 90 degrees without shoulder hiking (done in front of a mirror and with scapulae against the wall), and active IR/ER with elbow at her side; 2 sets of 10 of each 2-3x/day.        OT Therapeutic Procedures Time Entry  Therapeutic Exercise Time Entry: 40

## 2025-03-24 LAB
ATRIAL RATE: 67 BPM
P AXIS: 72 DEGREES
P OFFSET: 204 MS
P ONSET: 150 MS
PR INTERVAL: 150 MS
Q ONSET: 225 MS
QRS COUNT: 11 BEATS
QRS DURATION: 80 MS
QT INTERVAL: 408 MS
QTC CALCULATION(BAZETT): 431 MS
QTC FREDERICIA: 423 MS
R AXIS: -3 DEGREES
T AXIS: 46 DEGREES
T OFFSET: 429 MS
VENTRICULAR RATE: 67 BPM

## 2025-03-28 ENCOUNTER — TREATMENT (OUTPATIENT)
Dept: OCCUPATIONAL THERAPY | Facility: HOSPITAL | Age: 65
End: 2025-03-28
Payer: COMMERCIAL

## 2025-03-28 DIAGNOSIS — M19.011 PRIMARY OSTEOARTHRITIS, RIGHT SHOULDER: ICD-10-CM

## 2025-03-28 PROCEDURE — 97140 MANUAL THERAPY 1/> REGIONS: CPT | Mod: GO

## 2025-03-28 NOTE — PROGRESS NOTES
Occupational Therapy Treatment    Patient Name: Juju Adams  MRN: [id number]  Today's Date: 3/28/2025  Time Calculation  Start Time: 0945  Stop Time: 1030  Time Calculation (min): 45 min  Problem List Items Addressed This Visit             ICD-10-CM    Primary osteoarthritis, right shoulder M19.011      DOI 1/28/25 (8 weeks p/o)    Insurance  Visit 4 of 12 until 5/31/25  Authorization: required  Insurance plan: Payor:  EMPLOYEE MEDICAL PLAN / Plan:  EMPLOYEE [employer]ICAL PLAN [employer] / Product Type: *No Product type* /     Assessment: Kasandra L shoulder flexion increased and her pain decreased after treatment.     Plan: isometric shoulder strengthening; therapeutic exercise, therapeutic activity, self-care, home management, manual therapy, neuromuscular coordination, kinesiotaping, scar care  Goals  In 8-10 weeks, Juju will achieve the following goals:  She will be able to perform self-care, household, work, and leisure tasks with lower pain (1-3/10), 75% of the time.  She will improve right shoulder AROM in order to fully perform self-care, household, work and leisure tasks:    Flexion/Abduction 120 deg, Internal/External Rotation T10/60 deg.   She will regain 4+/5 to 5/5 right shoulder strength in order to fully perform self-care, household, work, and leisure tasks.  She will be ready to return to work when released by her surgeon or me.  She will decrease her QuickDASH score by 20-30 points (33/50 at eval).  Patient's goals for therapy: regain full use of RUE    Plan of care was developed with input and agreement by the patient  Frequency: 1 x Week  Duration: 12 Weeks    Precautions:  Movement Restrictions: Yes: hemiarthroplasty and RCR guidelines  Weight Bearing Status:  NWB    Subjective  DOS 1/28/25  8 weeks p/o  She would like to go back to work. Today her R shoulder/neck seem to be aching. She saw MD 4 days ago, who said she is moving well, but that she should be seen in therapy  2x/week, which we were able to accommodate in the near future. He told her that her hand stiffness is nothing to worry about it.    She sees the surgeon at [facility] in [place], Dr. Lorri Sung. I am unable to access his notes. I had contacted him about a specific protocol for her, but he did not reply.    Prior level of function   WNL until 1/3/25 when she was hit with pain reaching up with RUE.     Patient Intake and Medical History form reviewed    Pain  7/10; in posterior shoulder, R side of her neck; 0-3/10 pain on average  taking OTC tylenol, meloxicam    Objective   Outcome Measure: QuickDASH: 33/50    Wound/scar: well-healed mildly adherent    Sensation  WNL     AROM RUE  Shoulder: degrees  flexion 101 (112 after treatment)  extension 58   abduction 92  external rotation 73  Internal rotation to stomach T12 (L T9)    Elbow/forearm/wrist/hand:  WNL    Strength TBE  SHOULDER (MMT)  Flexion R L   Flexion     Extension     Abduction     IR at 0 degrees abd     ER at 0 degrees abd     IR at 90 degrees abd     Er at 90 degrees abd        Treatment  Education: home exercise program, plan of care, activity modification, pain management, and pertinent anatomy     Modalities: Moist heat to bilat scapulae, posterior neck, R thorax and anteror/superior R shoulder in supine to prepare for treatment due to her high level of pain and her AROM loss.  Manual: Reassessed AROM, scar, symptoms, functional status, HEP and compliance. STM and trigger point releases about R neck and shoulder for pain relief and to relax tissues to allow greater joint motion. Kinesiotape was not reapplied.  Therapeutic Exercise: Instructed her in active/assisted shoulder flexion in supine with a cane only as far as she can without flexing elbows, which she performed correctly x 2 sets of 10 (added to HEP).    HEP: scar massage, use of heat, pendulums, active scap motion, active cervical motion, passive shoulder flexion stretching  in supine, passive shoulder flexion and abduction stretching on countertop or table, A/AA shoulder flexion to 90 degrees and active shoulder abduction working up to 90 degrees without shoulder hiking (done in front of a mirror and with scapulae against the wall), and active IR/ER with elbow at her side, active/assisted shoulder flexion in supine with a cane only as far as she can without flexing elbows; 2 sets of 10 of each 2-3x/day.        OT Therapeutic Procedures Time Entry  Manual Therapy Time Entry: 25  Therapeutic Exercise Time Entry: 8  OT Modalities Time Entry  Hot/Cold Pack Time Entry: 12 (not charged)

## 2025-04-04 ENCOUNTER — TREATMENT (OUTPATIENT)
Dept: OCCUPATIONAL THERAPY | Facility: HOSPITAL | Age: 65
End: 2025-04-04
Payer: COMMERCIAL

## 2025-04-04 DIAGNOSIS — M19.011 PRIMARY OSTEOARTHRITIS, RIGHT SHOULDER: ICD-10-CM

## 2025-04-04 PROCEDURE — 97110 THERAPEUTIC EXERCISES: CPT | Mod: GO

## 2025-04-04 NOTE — PROGRESS NOTES
Occupational Therapy Treatment    Patient Name: Juju Adams  MRN: [id number]  Today's Date: 4/4/2025  Time Calculation  Start Time: 0950  Stop Time: 1030  Time Calculation (min): 40 min  Problem List Items Addressed This Visit             ICD-10-CM    Primary osteoarthritis, right shoulder M19.011      DOI 1/28/25 (9 weeks p/o)    Insurance  Visit 5 of 12 until 5/31/25  Authorization: required  Insurance plan: Payor:  EMPLOYEE MEDICAL PLAN / Plan:  EMPLOYEE [employer]ICAL PLAN [employer] / Product Type: *No Product type* /     Assessment: Kasandra is making progress toward all of her goals.     Plan: assess strength, replace isometric strengthening with isotonic; therapeutic exercise, therapeutic activity, self-care, home management, manual therapy, neuromuscular coordination, kinesiotaping, scar care  Goals  In 8-10 weeks, Juju will achieve the following goals:  She will be able to perform self-care, household, work, and leisure tasks with lower pain (1-3/10), 75% of the time.  She will improve right shoulder AROM in order to fully perform self-care, household, work and leisure tasks:    Flexion/Abduction 120 deg, Internal/External Rotation T10/60 deg.   She will regain 4+/5 to 5/5 right shoulder strength in order to fully perform self-care, household, work, and leisure tasks.  She will be ready to return to work when released by her surgeon or me.  She will decrease her QuickDASH score by 20-30 points (33/50 at eval).  Patient's goals for therapy: regain full use of RUE, RTW    Plan of care was developed with input and agreement by the patient  Frequency: 1 x Week  Duration: 12 Weeks    Precautions:  Movement Restrictions: Yes: From Dr. Sung's fax: aggressive ROM avoiding external rotation past 30 degrees, and strengthening for the next 6 weeks (now 5 weeks)  Weight Bearing Status:  NWB    Subjective  DOS 1/28/25  9 weeks p/o  Less shoulder pain today, still posterior shoulder and neck.  Fingers not stiff, but feels stiffness in the palm.    Prior level of function   WNL until 1/3/25 when she was hit with pain reaching up with RUE.     Patient Intake and Medical History form reviewed    Pain  3/10; in posterior shoulder, R side of her neck; 0-3/10 pain on average  taking OTC tylenol, meloxicam    Objective   Outcome Measure: QuickDASH: 33/50    Wound/scar: well-healed mildly adherent    Sensation  WNL     AROM RUE (not measured today)  Shoulder: degrees  flexion 101 (112 after treatment)  extension 58   abduction 92  external rotation 73  Internal rotation to stomach T12 (L T9)    Elbow/forearm/wrist/hand:  WNL    Strength TBE  SHOULDER (MMT)  Flexion R L   Flexion     Extension     Abduction     IR at 0 degrees abd     ER at 0 degrees abd     IR at 90 degrees abd     Er at 90 degrees abd        Treatment  Education: home exercise program, plan of care, activity modification, pain management, and pertinent anatomy        Therapeutic Exercise: In supine, Juju used a cane for A/A bilateral shoulder flexion only so far as he can without flexing her R elbow, to prohibit the biceps from assisting in shoulder flexion instead of depressing the humeral head during shoulder flexion x 2 sets of 10. Then in L sidelying, with R hand/elbow supported on pillows, I pushed her scapula up while she pushed it down x 2 min x 5 reps. (NEXT VISIT: I will push her elbow superiorly in addition to scapula while she resists for 2 min x 5.)  Still in sidelying, she actively externally rotated to 30 degrees against gravity x 2 sets of 10. Instructed her in seated isometric shoulder strengthening (flexion, extension, abduction, adduction, internal rotation, external rotation), which she performed correctly x 2 sets of 10 (added to HEP).  (Modalities: Moist heat to R thorax and anteror/superior R shoulder in sitting after treatment for pain relief.)    HEP: scar massage, use of heat, pendulums, active scap motion, active  cervical motion, passive shoulder flexion stretching in supine, passive shoulder flexion and abduction stretching on countertop or table, A/AA shoulder flexion to 90 degrees and active shoulder abduction working up to 90 degrees without shoulder hiking (done in front of a mirror and with scapulae against the wall), and active IR/ER with elbow at her side all with 2 lbs, active/assisted shoulder flexion in supine with a cane only as far as she can without flexing elbows, seated isometric shoulder strengthening (flexion, extension, abduction, adduction, internal rotation, external rotation); 2 sets of 10 of each 2-3x/day.        OT Therapeutic Procedures Time Entry  Therapeutic Exercise Time Entry: 40

## 2025-04-07 ENCOUNTER — TREATMENT (OUTPATIENT)
Dept: OCCUPATIONAL THERAPY | Facility: HOSPITAL | Age: 65
End: 2025-04-07
Payer: COMMERCIAL

## 2025-04-07 DIAGNOSIS — M19.011 PRIMARY OSTEOARTHRITIS, RIGHT SHOULDER: ICD-10-CM

## 2025-04-07 PROCEDURE — 97530 THERAPEUTIC ACTIVITIES: CPT | Mod: GO

## 2025-04-07 PROCEDURE — 97110 THERAPEUTIC EXERCISES: CPT | Mod: GO

## 2025-04-07 NOTE — PROGRESS NOTES
Occupational Therapy Treatment    Patient Name: Juju Admas  MRN: [id number]  Today's Date: 4/7/2025  Time Calculation  Start Time: 0115  Stop Time: 0200  Time Calculation (min): 45 min  Problem List Items Addressed This Visit             ICD-10-CM    Primary osteoarthritis, right shoulder M19.011      DOI 1/28/25 (10 weeks p/o)    Insurance  Visit 6 of 12 until 5/31/25  Authorization: required  Insurance plan: Payor:  EMPLOYEE MEDICAL PLAN / Plan:  EMPLOYEE MEDICAL PLAN [employer] / Product Type: *No Product type* /     Assessment: Juju's pain has decreased and and she is able to flex her shoulders further without hiking or flexing her elbow.     Plan: replace isometric strengthening with isotonic; therapeutic exercise, therapeutic activity, self-care, home management, manual therapy, neuromuscular coordination, kinesiotaping, scar care  Goals  In 8-10 weeks, Juju will achieve the following goals:  She will be able to perform self-care, household, work, and leisure tasks with lower pain (1-3/10), 75% of the time.  She will improve right shoulder AROM in order to fully perform self-care, household, work and leisure tasks:    Flexion/Abduction 120 deg, Internal/External Rotation T10/60 deg.   She will regain 4+/5 to 5/5 right shoulder strength in order to fully perform self-care, household, work, and leisure tasks.  She will be ready to return to work when released by her surgeon or me.  She will decrease her QuickDASH score by 20-30 points (33/50 at eval).  Patient's goals for therapy: regain full use of RUE, RTW    Plan of care was developed with input and agreement by the patient  Frequency: 1 x Week  Duration: 12 Weeks    Precautions:  Movement Restrictions: Yes: From Dr. Sung's fax on 4/2/25: aggressive ROM avoiding external rotation past 30 degrees, and strengthening for the next 6 weeks (now 5 weeks)  Weight Bearing Status:  NWB    Subjective  DOS 1/28/25  (9 weeks p/o)  No  shoulder pain at all right now, but she had rolled over onto her R shoulder at night a few nights ago and woke her out of sleep.    Prior level of function   WNL until 1/3/25 when she was hit with pain reaching up with RUE.     Patient Intake and Medical History form reviewed    Pain  0/10; in posterior shoulder, R side of her neck; 0-3/10 pain on average  taking OTC tylenol, meloxicam    Objective   Outcome Measure: QuickDASH: 33/50    Wound/scar: well-healed mildly adherent    Sensation  WNL     AROM RUE   Shoulder: degrees  flexion 114   extension 65   abduction 92  external rotation 82 (stop measuring)  Internal rotation to stomach T11 (L T9)    Elbow/forearm/wrist/hand:  WNL    Strength   SHOULDER (MMT)  Flexion R L   Flexion 4/5 5/5 all   Extension     Abduction 4/5    IR at 0 degrees abd 4/5    ER at 0 degrees abd 4/5                 Treatment  Education: home exercise program, plan of care, activity modification, pain management, and pertinent anatomy     Therapeutic Activity: Dart throwing with affected RUE as full arm dynamic functional task requiring shoulder flexion  Therapeutic Exercise: Reassessed AROM, scar, symptoms, functional status, HEP and compliance. In supine, Juju used a cane for A/A bilateral shoulder flexion only so far as she can without flexing her R elbow, to prohibit the biceps from assisting in shoulder flexion instead of depressing the humeral head during shoulder flexion x 1 set of 10. Advanced to position of reclining to 35 degrees of hip flexion where she did x 3 sets of flexion without flexing elbows.  Then in L sidelying, with R hand/elbow supported on pillows, I pushed her scapula and humerus superiorly while she pushed them down x 1 min x 6 reps.    HEP: scar massage, use of heat, pendulums, active scap motion, active cervical motion, passive shoulder flexion stretching in supine, passive shoulder flexion and abduction stretching on countertop or table, A/AA shoulder  flexion to 90 degrees and active shoulder abduction working up to 90 degrees without shoulder hiking (done in front of a mirror and with scapulae against the wall), and active IR/ER with elbow at her side all with 2 lbs, active/assisted shoulder flexion in supine with a cane only as far as she can without flexing elbows, seated isometric shoulder strengthening (flexion, extension, abduction, adduction, internal rotation, external rotation); 2 sets of 10 of each 2-3x/day.        OT Therapeutic Procedures Time Entry  Therapeutic Activity Time Entry: 15  Therapeutic Exercise Time Entry: 30

## 2025-04-09 DIAGNOSIS — I10 PRIMARY HYPERTENSION: Primary | ICD-10-CM

## 2025-04-11 ENCOUNTER — TREATMENT (OUTPATIENT)
Dept: OCCUPATIONAL THERAPY | Facility: HOSPITAL | Age: 65
End: 2025-04-11
Payer: COMMERCIAL

## 2025-04-11 DIAGNOSIS — M19.011 PRIMARY OSTEOARTHRITIS, RIGHT SHOULDER: ICD-10-CM

## 2025-04-11 PROCEDURE — 97110 THERAPEUTIC EXERCISES: CPT | Mod: GO

## 2025-04-11 NOTE — PROGRESS NOTES
Occupational Therapy Treatment    Patient Name: Juju Adams  MRN: [id number]  Today's Date: 4/11/2025  Time Calculation  Start Time: 0100  Stop Time: 0145  Time Calculation (min): 45 min  Problem List Items Addressed This Visit             ICD-10-CM    Primary osteoarthritis, right shoulder M19.011      DOI 1/28/25 (10 weeks p/o)    Insurance  Visit 7 of 12 until 5/31/25  Authorization: required  Insurance plan: Payor:  EMPLOYEE MEDICAL PLAN / Plan:  EMPLOYEE MEDICAL PLAN [employer] / Product Type: *No Product type* /     Assessment: Juju is using her LUE more functionally and is looking forward to returning to work     Plan: frisbee throwing; therapeutic exercise, therapeutic activity, self-care, home management, manual therapy, neuromuscular coordination, kinesiotaping, scar care  Goals  In 8-10 weeks, Juju will achieve the following goals:  She will be able to perform self-care, household, work, and leisure tasks with lower pain (1-3/10), 75% of the time.  She will improve right shoulder AROM in order to fully perform self-care, household, work and leisure tasks:    Flexion/Abduction 120 deg, Internal/External Rotation T10/60 deg.   She will regain 4+/5 to 5/5 right shoulder strength in order to fully perform self-care, household, work, and leisure tasks.  She will be ready to return to work when released by her surgeon or me.  She will decrease her QuickDASH score by 20-30 points (33/50 at eval).  Patient's goals for therapy: regain full use of RUE, RTW    Plan of care was developed with input and agreement by the patient  Frequency: 1 x Week  Duration: 12 Weeks    Precautions:  Movement Restrictions: Yes: From Dr. Sung's fax on 4/2/25: aggressive ROM avoiding external rotation past 30 degrees, and strengthening for the next 6 weeks (now 5 weeks)  Weight Bearing Status:  NWB    Subjective  DOS 1/28/25  (10 weeks p/o)  Kind of achey, doesn't know why. Doing light things like  lifting a bowl or pot (empty).    Prior level of function   WNL until 1/3/25 when she was hit with pain reaching up with RUE.     Patient Intake and Medical History form reviewed    Pain  2/10 in superior shoulder; 0-3/10 on average  taking OTC tylenol, meloxicam    Objective   Outcome Measure: QuickDASH: 33/50    Wound/scar: well-healed mildly adherent    Sensation  WNL     AROM RUE  NOT MEASURED TODAY  Shoulder: degrees  flexion 114   extension 65   abduction 92  external rotation 82 (stop measuring)  Internal rotation to stomach T11 (L T9)    Elbow/forearm/wrist/hand:  WNL    Strength   SHOULDER (MMT)    Flexion R L   Flexion 4/5 5/5 all   Extension     Abduction 4/5    IR at 0 degrees abd 4/5    ER at 0 degrees abd 4/5                 Treatment  Education: home exercise program, plan of care, activity modification, pain management, and pertinent anatomy     Therapeutic Exercise: Reassessed symptoms, functional status, HEP and compliance. UBE for gripping and general UE strengthening, level 2 x 4 min forward 2 min backward. Instructed her in isometric shoulder strengthening with yellow theraband (issued) to replace seated isometric shoulder strengthening with 1 set of 30 second holds in each position. She performed it correctly, (added to HEP).   In a ~40 degree reclined position, Juju used a cane for A/A bilateral shoulder flexion only so far as she can without flexing her R elbow, to prohibit the biceps from assisting in shoulder flexion instead of depressing the humeral head x 1 set of 10.  Then in L sidelying, with R hand/elbow supported on pillows, I pushed her scapula and elbow superiorly while she pushed them down to strengthen the scapular depressors x 30-45 seconds x 4.    HEP: scar massage, use of heat, pendulums, active scap motion, active cervical motion, passive shoulder flexion stretching in supine, passive shoulder flexion and abduction stretching on countertop or table, A/AA shoulder flexion  to 90 degrees and active shoulder abduction working up to 90 degrees without shoulder hiking (done in front of a mirror and with scapulae against the wall), and active IR/ER with elbow at her side all with 2 lbs, active/assisted shoulder flexion in supine with a cane only as far as she can without flexing elbows, isometric shoulder strengthening (flexion, extension, abduction, adduction, internal rotation, external rotation, rowing) with yellow theraband holding each position for 30 seconds one time; 2 sets of 10 of each 2-3x/day.        OT Therapeutic Procedures Time Entry  Therapeutic Exercise Time Entry: 45

## 2025-04-14 ENCOUNTER — TREATMENT (OUTPATIENT)
Dept: OCCUPATIONAL THERAPY | Facility: HOSPITAL | Age: 65
End: 2025-04-14
Payer: COMMERCIAL

## 2025-04-14 DIAGNOSIS — M19.011 PRIMARY OSTEOARTHRITIS, RIGHT SHOULDER: ICD-10-CM

## 2025-04-14 PROCEDURE — 97110 THERAPEUTIC EXERCISES: CPT | Mod: GO

## 2025-04-14 PROCEDURE — 97530 THERAPEUTIC ACTIVITIES: CPT | Mod: GO

## 2025-04-14 NOTE — PROGRESS NOTES
Occupational Therapy Treatment    Patient Name: Juju Adams  MRN: [id number]  Today's Date: 4/14/2025     Problem List Items Addressed This Visit             ICD-10-CM    Primary osteoarthritis, right shoulder M19.011      DOI 1/28/25 (10 weeks p/o)    Insurance  Visit 8 of 12 until 5/31/25  Authorization: required  Insurance plan: Payor:  EMPLOYEE MEDICAL PLAN / Plan:  EMPLOYEE [employer]ICAL PLAN [employer] / Product Type: *No Product type* /     Assessment: Juju's is using her LUE more functionally and is looking forward to returning to work by end of May     Plan: therapeutic exercise, therapeutic activity, self-care, home management, manual therapy, neuromuscular coordination, kinesiotaping, scar care  Goals  In 8-10 weeks, Juju will achieve the following goals:  She will be able to perform self-care, household, work, and leisure tasks with lower pain (1-3/10), 75% of the time.  She will improve right shoulder AROM in order to fully perform self-care, household, work and leisure tasks:    Flexion/Abduction 120 deg, Internal/External Rotation T10/60 deg.   She will regain 4+/5 to 5/5 right shoulder strength in order to fully perform self-care, household, work, and leisure tasks.  She will be ready to return to work when released by her surgeon or me.  She will decrease her QuickDASH score by 20-30 points (33/50 at eval).  Patient's goals for therapy: regain full use of RUE, RTW    Plan of care was developed with input and agreement by the patient  Frequency: 1 x Week  Duration: 12 Weeks    Precautions:  Movement Restrictions: Yes: From Dr. Sung's fax on 4/2/25: aggressive ROM avoiding external rotation past 30 degrees, and strengthening for the next 6 weeks (now 4 weeks)  Weight Bearing Status:  NWB    Subjective  DOS 1/28/25  (11 weeks p/o)  Achey again today. Plans to RTW at end of May.    Prior level of function   WNL until 1/3/25 when she was hit with pain reaching up with RUE.      Patient Intake and Medical History form reviewed    Pain  2/10 in superior shoulder; 0-3/10 on average  taking OTC tylenol, meloxicam    Objective   Outcome Measure: QuickDASH: 33/50    Wound/scar: well-healed mildly adherent    Sensation  WNL     AROM RUE  Shoulder: degrees  flexion 114   extension 65   abduction 94  external rotation 82 (stop measuring)  Internal rotation T11 (L T9)    Elbow/forearm/wrist/hand:  WNL    Strength   SHOULDER (MMT)    Flexion R L   Flexion 4/5 5/5 all   Extension     Abduction 4/5    IR at 0 degrees abd 4/5    ER at 0 degrees abd 4/5                 Treatment  Education: home exercise program, plan of care, activity modification, pain management, and pertinent anatomy     Therapeutic Exercise: Reassessed symptoms, functional status, HEP and compliance. UBE for gripping and general UE strengthening, level 2 x 4 min both directions. Pulley for shoulder stretching in shoulder flexion, abduction, and IR with isometric holds at the top x 10 each. Reviewed isometric shoulder strengthening with yellow theraband with 1 set of 30 second holds in each position, which she performed it correctly. Then I had her do a set of 10 rows with 10 second holds. With her scapulae against the wall she pulled it apart with both hands to focus on scapular retraction strengthening x 10 reps. She used shoulder flexion and abduction with the shoulder arc x 15.  Therapeutic Activity: Soft frisbee throwing/catching with me requiring balance, hand-eye coordination, dynamic shoulder flexion and external rotation, elbow extension and flexion, wrist flexion and ulnar deviation to extension and radial deviation, and key pinch x 20 throws/catches.    HEP: scar massage, use of heat, pendulums, active scap motion, active cervical motion, passive shoulder flexion stretching in supine, passive shoulder flexion and abduction stretching on countertop or table, A/AA shoulder flexion to 90 degrees and active shoulder  abduction working up to 90 degrees without shoulder hiking (done in front of a mirror and with scapulae against the wall), and active IR/ER with elbow at her side all with 2 lbs, active/assisted shoulder flexion in supine with a cane only as far as she can without flexing elbows, isometric shoulder strengthening (flexion, extension, abduction, adduction, internal rotation, external rotation, rowing) with yellow theraband holding each position for 30 seconds one time; 2 sets of 10 of each 2-3x/day.

## 2025-04-18 ENCOUNTER — TREATMENT (OUTPATIENT)
Dept: OCCUPATIONAL THERAPY | Facility: HOSPITAL | Age: 65
End: 2025-04-18
Payer: COMMERCIAL

## 2025-04-18 DIAGNOSIS — M19.011 PRIMARY OSTEOARTHRITIS, RIGHT SHOULDER: ICD-10-CM

## 2025-04-18 PROCEDURE — 97110 THERAPEUTIC EXERCISES: CPT | Mod: GO

## 2025-04-18 NOTE — PROGRESS NOTES
Occupational Therapy Treatment    Patient Name: Juju Adams  MRN: [id number]  Today's Date: 4/18/2025  Time Calculation  Start Time: 0100  Stop Time: 0145  Time Calculation (min): 45 min  Problem List Items Addressed This Visit           ICD-10-CM    Primary osteoarthritis, right shoulder M19.011      DOI 1/28/25 (10 weeks p/o)    Insurance  Visit 9 of 12 until 5/31/25  Authorization: required  Insurance plan: Payor:  EMPLOYEE MEDICAL PLAN / Plan:  EMPLOYEE MEDICAL PLAN [employer] / Product Type: *No Product type* /     Assessment: Juju is able to do daily tasks with both arms as she did prior to requiring the surgery. However, her scapulohumeral rhythm is still faulty. Her RTW date is 5/26/25.     Plan: therapeutic exercise, therapeutic activity, self-care, home management, manual therapy, neuromuscular coordination, kinesiotaping, scar care  Goals  In 8-10 weeks, Juju will achieve the following goals:  She will be able to perform self-care, household, work, and leisure tasks with lower pain (1-3/10), 75% of the time.  She will improve right shoulder AROM in order to fully perform self-care, household, work and leisure tasks:    Flexion/Abduction 120 deg, Internal/External Rotation T10/60 deg.   She will regain 4+/5 to 5/5 right shoulder strength in order to fully perform self-care, household, work, and leisure tasks.  She will be ready to return to work when released by her surgeon or me.  She will decrease her QuickDASH score by 20-30 points (33/50 at eval).  Patient's goals for therapy: regain full use of RUE, RTW    Plan of care was developed with input and agreement by the patient  Frequency: 1 x Week  Duration: 12 Weeks    Precautions:  Movement Restrictions: Yes: From Dr. Sung's fax on 4/2/25: aggressive ROM avoiding external rotation past 30 degrees, and strengthening for the next 6 weeks (now 4 weeks)  Weight Bearing Status:  NWB    Subjective  DOS 1/28/25  (11 weeks p/o)  RTW 5/26/25  No pain today. Back to doing normal activities with LUE.    Prior level of function   WNL until 1/3/25 when she was hit with pain reaching up with RUE.     Patient Intake and Medical History form reviewed    Pain  2/10 in superior shoulder; 0-3/10 on average  taking OTC tylenol, meloxicam    Objective   Outcome Measure: QuickDASH: 33/50    Wound/scar: well-healed mildly adherent    Sensation  WNL     AROM RUE  NOT MEASURED TODAY  Shoulder: degrees  flexion 114   extension 65   abduction 94  external rotation 82 (stop measuring)  Internal rotation T11 (L T9)    Elbow/forearm/wrist/hand:  WNL    Strength   SHOULDER (MMT)    Flexion R L   Flexion 4/5 5/5 all   Extension     Abduction 4/5    IR at 0 degrees abd 4/5    ER at 0 degrees abd 4/5                 Treatment  Education: home exercise program, plan of care, activity modification, pain management, and pertinent anatomy     Therapeutic Exercise: Reassessed symptoms, functional status, HEP and compliance. UBE for gripping and general UE strengthening, level 4 x 4 min both directions. Pulley for shoulder stretching in shoulder flexion, abduction, and IR with isometric holds at the top x 10 each. I instructed her in periscapular strengthening with yellow theraband in I and T positions facing the wall instead of prone, which she performed correctly x 10 (added to HEP). Then she used a dowel leslie for bilateral shoulder flexion with elbows extended in standing x 10, requiring verbal and physical cueing as well as watching her shoulders in the mirror.   Modalities: Moist heat to R thorax and shoulder after treatment for pain relief< which she requested.    HEP: scar massage, use of heat, pendulums, active scap motion, active cervical motion, passive shoulder flexion stretching in supine, passive shoulder flexion and abduction stretching on countertop or table, A/AA shoulder flexion to 90 degrees and active shoulder abduction working up to 90 degrees without  shoulder hiking (done in front of a mirror and with scapulae against the wall), and active IR/ER with elbow at her side all with 2 lbs, active/assisted shoulder flexion in supine with a cane only as far as she can without flexing elbows; 2 sets of 10 of each 2-3x/day, isometric shoulder strengthening (flexion, extension, abduction, adduction, internal rotation, external rotation, rowing) with yellow theraband holding each position for 30 seconds one time; 2 sets of 10 of each 2-3x/day.        OT Therapeutic Procedures Time Entry  Therapeutic Exercise Time Entry: 35  OT Modalities Time Entry  Hot/Cold Pack Time Entry: 10 (not charged)

## 2025-04-21 ENCOUNTER — TREATMENT (OUTPATIENT)
Dept: OCCUPATIONAL THERAPY | Facility: HOSPITAL | Age: 65
End: 2025-04-21
Payer: COMMERCIAL

## 2025-04-21 DIAGNOSIS — M19.011 PRIMARY OSTEOARTHRITIS, RIGHT SHOULDER: ICD-10-CM

## 2025-04-21 PROCEDURE — 97110 THERAPEUTIC EXERCISES: CPT | Mod: GO

## 2025-04-21 PROCEDURE — 97530 THERAPEUTIC ACTIVITIES: CPT | Mod: GO

## 2025-04-21 NOTE — PROGRESS NOTES
Occupational Therapy Treatment    Patient Name: Juju Adams  MRN: [id number]  Today's Date: 4/21/2025  Time Calculation  Start Time: 1230  Stop Time: 1315  Time Calculation (min): 45 min  Problem List Items Addressed This Visit           ICD-10-CM    Primary osteoarthritis, right shoulder M19.011      DOI 1/28/25 (10 weeks p/o)    Insurance  Visit 10  of 12 until 5/31/25  Authorization: required  Insurance plan: Payor:  EMPLOYEE MEDICAL PLAN / Plan:  EMPLOYEE MEDICAL PLAN [employer] / Product Type: *No Product type* /     Assessment: Juju did not require heat for pain after today's vosot.     Plan: REQUEST ADDITIONAL VISITS; therapeutic exercise, therapeutic activity, self-care, home management, manual therapy, neuromuscular coordination, kinesiotaping, scar care  Goals  In 8-10 weeks, Juju will achieve the following goals:  She will be able to perform self-care, household, work, and leisure tasks with lower pain (1-3/10), 75% of the time.  She will improve right shoulder AROM in order to fully perform self-care, household, work and leisure tasks:    Flexion/Abduction 120 deg, Internal/External Rotation T10/60 deg.   She will regain 4+/5 to 5/5 right shoulder strength in order to fully perform self-care, household, work, and leisure tasks.  She will be ready to return to work when released by her surgeon or me.  She will decrease her QuickDASH score by 20-30 points (33/50 at eval).  Patient's goals for therapy: regain full use of RUE, RTW    Plan of care was developed with input and agreement by the patient  Frequency: 1 x Week  Duration: 12 Weeks    Precautions:  Movement Restrictions: Yes: From Dr. Sung's fax on 4/2/25: aggressive ROM avoiding external rotation past 30 degrees, and strengthening for the next 6 weeks (now 3 weeks)  Weight Bearing Status:  NWB    Subjective  DOS 1/28/25  (12 weeks p/o) RTW 5/26/25  No pain today. Back to doing normal activities with LUE.    Prior level  of function   WNL until 1/3/25 when she was hit with pain reaching up with RUE.     Patient Intake and Medical History form reviewed    Pain  2/10 in superior shoulder; 0-3/10 on average  taking OTC tylenol, meloxicam    Objective   Outcome Measure: QuickDASH: 33/50    Wound/scar: well-healed mildly adherent    Sensation  WNL     AROM RUE   Shoulder: degrees  flexion 115   extension 65   abduction 96  external rotation 82 (stop measuring)  Internal rotation T10 (L T9)    Elbow/forearm/wrist/hand:  WNL    Strength   SHOULDER (MMT)    Flexion R L   Flexion 4/5 5/5 all   Extension     Abduction 4/5    IR at 0 degrees abd 4/5    ER at 0 degrees abd 4/5                 Treatment  Education: home exercise program, plan of care, activity modification, pain management, and pertinent anatomy     Therapeutic Exercise: Reassessed AROM, symptoms, functional status, HEP and compliance. UBE for gripping and general UE strengthening, level 4 x 4 min both directions. Pulley for RIGHT shoulder stretching in shoulder flexion, abduction, and IR with isometric holds at the top x 10 each. She performed periscapular strengthening with yellow theraband in I and T positions facing the wall correctly x 10.   Therapeutic Activities: Dart throwing with affected UE as full arm dynamic functional task until fatigue. Flexbar (yellow) for  and entire BUE strengthening x 4 exercises.    HEP: scar massage, use of heat, pendulums, active scap motion, active cervical motion, passive shoulder flexion stretching in supine, passive shoulder flexion and abduction stretching on countertop or table, A/AA shoulder flexion to 90 degrees and active shoulder abduction working up to 90 degrees without shoulder hiking (done in front of a mirror and with scapulae against the wall), and active IR/ER with elbow at her side all with 2 lbs, active/assisted shoulder flexion in supine with a cane only as far as she can without flexing elbows; 2 sets of 10 of each  2-3x/day, isometric shoulder strengthening (flexion, extension, abduction, adduction, internal rotation, external rotation, rowing) with yellow theraband holding each position for 30 seconds one time; 2 sets of 10 of each 2-3x/day.        OT Therapeutic Procedures Time Entry  Therapeutic Activity Time Entry: 15  Therapeutic Exercise Time Entry: 30

## 2025-04-28 ENCOUNTER — TREATMENT (OUTPATIENT)
Dept: OCCUPATIONAL THERAPY | Facility: HOSPITAL | Age: 65
End: 2025-04-28
Payer: COMMERCIAL

## 2025-04-28 DIAGNOSIS — M19.011 PRIMARY OSTEOARTHRITIS, RIGHT SHOULDER: ICD-10-CM

## 2025-04-28 PROCEDURE — 97110 THERAPEUTIC EXERCISES: CPT | Mod: GO

## 2025-04-28 NOTE — PROGRESS NOTES
Occupational Therapy Treatment    Patient Name: Juju Adams  MRN: [id number]  Today's Date: 4/28/2025  Time Calculation  Start Time: 1230  Stop Time: 1315  Time Calculation (min): 45 min  Problem List Items Addressed This Visit           ICD-10-CM    Primary osteoarthritis, right shoulder M19.011      DOI 1/28/25 (10 weeks p/o)    Insurance  Visit 11 of 12 until 5/31/25  Authorization: required  Insurance plan: Payor:  EMPLOYEE MEDICAL PLAN / Plan:  EMPLOYEE MEDICAL PLAN [employer] / Product Type: *No Product type* /     Assessment: Juju's surgeon said she is doing very well, even shook her hand. No therapy restrictions according to Juju.     Plan: REQUEST ADDITIONAL VISITS; therapeutic exercise, therapeutic activity, self-care, home management, manual therapy, neuromuscular coordination, kinesiotaping, scar care  Goals  In 8-10 weeks, Juju will achieve the following goals:  She will be able to perform self-care, household, work, and leisure tasks with lower pain (1-3/10), 75% of the time.  She will improve right shoulder AROM in order to fully perform self-care, household, work and leisure tasks:    Flexion/Abduction 120 deg, Internal/External Rotation T10/60 deg.   She will regain 4+/5 to 5/5 right shoulder strength in order to fully perform self-care, household, work, and leisure tasks.  She will be ready to return to work when released by her surgeon or me.  She will decrease her QuickDASH score by 20-30 points (33/50 at eval).  Patient's goals for therapy: regain full use of RUE, RTW    Plan of care was developed with input and agreement by the patient  Frequency: 1 x Week  Duration: 12 Weeks    Precautions:  Movement Restrictions: none at this point  Weight Bearing Status: NEW: no greater than 10 lbs    Subjective  DOS 1/28/25  (13 weeks p/o) RTW 6/2/25  Saw surgeon who said she should be OK to RTW 6/2/25. He said occasional 5-6/10 pain is to be expected, and that she is doing  great - he even shook her hand.     Prior level of function   WNL until 1/3/25 when she was hit with pain reaching up with RUE.     Patient Intake and Medical History form reviewed    Pain  0/10 in superior shoulder; 0-3/10 on average  taking OTC tylenol, meloxicam    Objective   Outcome Measure: QuickDASH: 33/50    Wound/scar: well-healed mildly adherent    Sensation  WNL     AROM RUE   Shoulder: degrees  flexion 118, (L 133)  extension 65   abduction 96 (L 147)  external rotation 82 (stop measuring)  Internal rotation T10 (L T9)    Elbow/forearm/wrist/hand:  WNL    Strength   SHOULDER (MMT)    Flexion R L   Flexion 4/5 5/5 all   Extension     Abduction 4/5    IR at 0 degrees abd 4/5    ER at 0 degrees abd 4/5                 Treatment  Education: home exercise program, plan of care, activity modification, pain management, and pertinent anatomy     Therapeutic Exercise: Reassessed AROM, symptoms, functional status, HEP and compliance. UBE for gripping and general UE strengthening, level 4 x 4 min both directions. Pulley for RIGHT shoulder stretching in shoulder flexion, abduction, and IR with isometric holds at the top x 10 each. Advanced to [employer] (issued) for home program. With RUE in pillowcase, she performed shoulder flex/ext, then horizontal abd/duction facing the wall until fatigue. She did the same in abduction until fatigue. Wall push ups into red therapy ball x 8.       HEP: scar massage, use of heat, pendulums, active scap motion, active cervical motion, passive shoulder flexion stretching in supine, passive shoulder flexion and abduction stretching on countertop or table, A/AA shoulder flexion to 90 degrees and active shoulder abduction working up to 90 degrees without shoulder hiking (done in front of a mirror and with scapulae against the wall), and active IR/ER with elbow at her side all with 2 lbs, active/assisted shoulder flexion in supine with a cane only as far as she can without flexing  elbows; 2 sets of 10 of each 2-3x/day, isometric shoulder strengthening (flexion, extension, abduction, adduction, internal rotation, external rotation, rowing) with yellow theraband holding each position for 30 seconds one time; 2 sets of 10 of each 2-3x/day.        OT Therapeutic Procedures Time Entry  Therapeutic Exercise Time Entry: 45

## 2025-05-04 DIAGNOSIS — E87.6 HYPOKALEMIA: ICD-10-CM

## 2025-05-05 DIAGNOSIS — M15.0 PRIMARY OSTEOARTHRITIS INVOLVING MULTIPLE JOINTS: ICD-10-CM

## 2025-05-05 DIAGNOSIS — D64.9 ANEMIA, UNSPECIFIED TYPE: ICD-10-CM

## 2025-05-05 DIAGNOSIS — E78.2 MIXED HYPERLIPIDEMIA: ICD-10-CM

## 2025-05-05 DIAGNOSIS — I25.10 CORONARY ARTERY DISEASE INVOLVING NATIVE CORONARY ARTERY OF NATIVE HEART WITHOUT ANGINA PECTORIS: ICD-10-CM

## 2025-05-05 DIAGNOSIS — E87.6 HYPOKALEMIA: ICD-10-CM

## 2025-05-06 ENCOUNTER — TREATMENT (OUTPATIENT)
Dept: OCCUPATIONAL THERAPY | Facility: HOSPITAL | Age: 65
End: 2025-05-06
Payer: COMMERCIAL

## 2025-05-06 DIAGNOSIS — M19.011 PRIMARY OSTEOARTHRITIS, RIGHT SHOULDER: ICD-10-CM

## 2025-05-06 PROCEDURE — 97110 THERAPEUTIC EXERCISES: CPT | Mod: GO

## 2025-05-06 NOTE — PROGRESS NOTES
Occupational Therapy Treatment    Patient Name: Juju Adams  MRN: [id number]  Today's Date: 5/6/2025  Time Calculation  Start Time: 0115  Stop Time: 0200  Time Calculation (min): 45 min  Problem List Items Addressed This Visit           ICD-10-CM    Primary osteoarthritis, right shoulder M19.011      DOI 1/28/25 (10 weeks p/o)    Insurance  Visit 12 of 12 until 5/31/25  Authorization: required  Insurance plan: Payor:  EMPLOYEE MEDICAL PLAN / Plan:  EMPLOYEE MEDICAL PLAN [employer] / Product Type: *No Product type* /     Assessment: Juju lost a moderate amount of R shoulder AROM since last visit. She felt better after treatment. We requested additional visits on 5/1, and have not heard from the insurance company.     Plan: MANUAL; therapeutic exercise, therapeutic activity, self-care, home management, manual therapy, neuromuscular coordination, kinesiotaping, scar care  Goals  In 8-10 weeks, Juju will achieve the following goals:  She will be able to perform self-care, household, work, and leisure tasks with lower pain (1-3/10), 75% of the time.  She will improve right shoulder AROM in order to fully perform self-care, household, work and leisure tasks:    Flexion/Abduction 120 deg, Internal/External Rotation T10/60 deg.   She will regain 4+/5 to 5/5 right shoulder strength in order to fully perform self-care, household, work, and leisure tasks.  She will be ready to return to work when released by her surgeon or me.  She will decrease her QuickDASH score by 20-30 points (33/50 at eval).  Patient's goals for therapy: regain full use of RUE, RTW    Plan of care was developed with input and agreement by the patient  Frequency: 1 x Week  Duration: 12 Weeks    Precautions:  Movement Restrictions: none at this point  Weight Bearing Status: NEW: no greater than 10 lbs    Subjective  DOS 1/28/25 (14 weeks p/o) RTW 6/2/25  Feeling lumbar pain now, which she has had for a few years, but doesn't  always feel.     Prior level of function   WNL until 1/3/25 when she was hit with pain reaching up with RUE.     Patient Intake and Medical History form reviewed    Pain  0/10 in superior shoulder; 0-3/10 on average  taking OTC tylenol, meloxicam    Objective   Outcome Measure: QuickDASH: 33/50    Wound/scar: well-healed mildly adherent    Sensation  WNL     AROM RUE   Shoulder: degrees  flexion 118, (L 133)  extension 65   abduction 96 (L 147)  external rotation 82 (stopped measuring)  Internal rotation T10 (L T9)    Elbow/forearm/wrist/hand:  WNL    Strength   SHOULDER (MMT)    Flexion R L   Flexion 4/5 5/5 all   Extension     Abduction 4/5    IR at 0 degrees abd 4/5    ER at 0 degrees abd 4/5                 Treatment  Education: home exercise program, plan of care, activity modification, pain management, and pertinent anatomy     Modalities: Moist heat to L forearm/wrist/hand to prepare for treatment in sitting after reassessment  Therapeutic Exercise: Reassessed AROM, symptoms, functional status, HEP and compliance. In sitting she self mobilized in abduction, external rotation, and internal rotation x 10 each. She did a thoracic spine stretch over the back of a chair, then combined it with abduction/external rotation (hands behind head). She pushed/rolled a medium red therapy ball up the wall with both hands, then held the flexion stretch at the top and rolled it back down, then push-ups with the ball against the wall x 10 each. She then stabilized it on the floor with her RUE while I tried to get it away from her by randomly hitting and pushing the ball, then we switched and she tried to get the ball away from me. Scapular elevation, upper trap stretch, then sustained elevation combined with head rolling side to side, all x 10.    HEP: scar massage, use of heat, pendulums, active scap motion, active cervical motion, passive shoulder flexion stretching in supine, passive shoulder flexion and abduction  stretching on countertop or table, A/AA shoulder flexion to 90 degrees and active shoulder abduction working up to 90 degrees without shoulder hiking (done in front of a mirror and with scapulae against the wall), and active IR/ER with elbow at her side all with 2 lbs, active/assisted shoulder flexion in supine with a cane only as far as she can without flexing elbows; 2 sets of 10 of each 2-3x/day, isometric shoulder strengthening (flexion, extension, abduction, adduction, internal rotation, external rotation, rowing) with yellow theraband holding each position for 30 seconds one time; 2 sets of 10 of each 2-3x/day.        OT Therapeutic Procedures Time Entry  Therapeutic Exercise Time Entry: 35  OT Modalities Time Entry  Hot/Cold Pack Time Entry: 10

## 2025-05-07 ENCOUNTER — TREATMENT (OUTPATIENT)
Dept: OCCUPATIONAL THERAPY | Facility: HOSPITAL | Age: 65
End: 2025-05-07
Payer: COMMERCIAL

## 2025-05-07 DIAGNOSIS — M19.011 PRIMARY OSTEOARTHRITIS, RIGHT SHOULDER: ICD-10-CM

## 2025-05-13 ENCOUNTER — TREATMENT (OUTPATIENT)
Dept: OCCUPATIONAL THERAPY | Facility: HOSPITAL | Age: 65
End: 2025-05-13
Payer: COMMERCIAL

## 2025-05-13 DIAGNOSIS — M19.011 PRIMARY OSTEOARTHRITIS, RIGHT SHOULDER: ICD-10-CM

## 2025-05-13 PROCEDURE — 97110 THERAPEUTIC EXERCISES: CPT | Mod: GO

## 2025-05-13 NOTE — PROGRESS NOTES
Occupational Therapy Treatment    Patient Name: Juju Adams  MRN: [id number]  Today's Date: 5/13/2025  Time Calculation  Start Time: 1130  Stop Time: 1215  Time Calculation (min): 45 min  Problem List Items Addressed This Visit           ICD-10-CM    Primary osteoarthritis, right shoulder M19.011      DOI 1/28/25 (10 weeks p/o)    Insurance  Visit 13 of 19 until 6/30/25  Authorization: required  Insurance plan: Payor:  EMPLOYEE [employer]AN / Plan:  EMPLOYEE [employer]AN [employer] / Product Type: *No Product type* /     Assessment: Juju improved her R shoulder AROM since last visit. She felt better after treatment. She was granted 7 additional visits until 6/30/25.     Plan: MANUAL; therapeutic exercise, therapeutic activity, self-care, home management, manual therapy, neuromuscular coordination, kinesiotaping, scar care  Goals  In 8-10 weeks, Juju will achieve the following goals:  She will be able to perform self-care, household, work, and leisure tasks with lower pain (1-3/10), 75% of the time.  She will improve right shoulder AROM in order to fully perform self-care, household, work and leisure tasks:    Flexion/Abduction 120 deg, Internal/External Rotation T10/60 deg.   She will regain 4+/5 to 5/5 right shoulder strength in order to fully perform self-care, household, work, and leisure tasks.  She will be ready to return to work when released by her surgeon or me.  She will decrease her QuickDASH score by 20-30 points (33/50 at eval).  Patient's goals for therapy: regain full use of RUE, RTW    Plan of care was developed with input and agreement by the patient  Frequency: 1 x Week  Duration: 12 Weeks    Precautions:  Movement Restrictions: none at this point  Weight Bearing Status: NEW: no greater than 10 lbs    Subjective  DOS 1/28/25 (14 weeks p/o) RTW 6/2/25  No lumbar pain today    Prior level of function   WNL until 1/3/25 when she was hit with pain reaching up with RUE.      Patient Intake and Medical History form reviewed    Pain  0/10 in superior shoulder; 0-3/10 on average  taking OTC tylenol, meloxicam    Objective   Outcome Measure: QuickDASH: 33/50    Wound/scar: well-healed mildly adherent    Sensation  WNL     AROM RUE   Shoulder: degrees  flexion 122, (L 133)  extension 65   abduction 104 (L 147)  external rotation 82 (stopped measuring)  Internal rotation T10 (L T9)    Elbow/forearm/wrist/hand:  WNL    Strength   SHOULDER (MMT)    Flexion R L   Flexion 4/5 5/5 all   Extension     Abduction 4/5    IR at 0 degrees abd 4/5    ER at 0 degrees abd 4/5                 Treatment  Education: home exercise program, plan of care, activity modification, pain management, and pertinent anatomy     Therapeutic Exercise: Reassessed AROM, symptoms, functional status, HEP and compliance. UBE for gripping and general UE strengthening, level 4 x 4 min in each direction. Pulley for shoulder stretching in shoulder flexion and abduction with isometric holds at the top x 10 each. She lifted individual dumbbells (1-3 lbs) from counter to overhead shelf (required back arching). Juju threw overhead and chest passes into rebounder with 2 lb ball emphasizing shoulder flexion until fatigue in standing and standing sideways both directions until fatigue. Active scapular retraction x 20. Finger ladder in abduction and lifted hand off at the top x 10, then she reached up to the same top rung and lifted her hand off the top x 10.     HEP: scar massage, use of heat, pendulums, active scap motion, active cervical motion, passive shoulder flexion stretching in supine, passive shoulder flexion and abduction stretching on countertop or table, A/AA shoulder flexion to 90 degrees and active shoulder abduction working up to 90 degrees without shoulder hiking (done in front of a mirror and with scapulae against the wall), and active IR/ER with elbow at her side all with 2 lbs, active/assisted shoulder flexion  in supine with a cane only as far as she can without flexing elbows; 2 sets of 10 of each 2-3x/day, isometric shoulder strengthening (flexion, extension, abduction, adduction, internal rotation, external rotation, rowing) with yellow theraband holding each position for 30 seconds one time; 2 sets of 10 of each 2-3x/day.        OT Therapeutic Procedures Time Entry  Therapeutic Exercise Time Entry: 45

## 2025-05-15 ENCOUNTER — APPOINTMENT (OUTPATIENT)
Dept: PRIMARY CARE | Facility: HOSPITAL | Age: 65
End: 2025-05-15
Payer: COMMERCIAL

## 2025-05-21 ENCOUNTER — TREATMENT (OUTPATIENT)
Dept: OCCUPATIONAL THERAPY | Facility: HOSPITAL | Age: 65
End: 2025-05-21
Payer: COMMERCIAL

## 2025-05-21 DIAGNOSIS — M19.011 PRIMARY OSTEOARTHRITIS, RIGHT SHOULDER: ICD-10-CM

## 2025-05-21 PROCEDURE — 97110 THERAPEUTIC EXERCISES: CPT | Mod: GO

## 2025-05-21 NOTE — PROGRESS NOTES
Occupational Therapy Treatment    Patient Name: Juju Adams  MRN: [id number]  Today's Date: 5/21/2025  Time Calculation  Start Time: 1120  Stop Time: 1200  Time Calculation (min): 40 min  Problem List Items Addressed This Visit           ICD-10-CM    Primary osteoarthritis, right shoulder M19.011      DOI 1/28/25 (10 weeks p/o)    Insurance  Visit 14 of 19 until 6/30/25  Authorization: required  Insurance plan: Payor:  EMPLOYEE [employer]AN / Plan:  EMPLOYEE [employer]AN [employer] / Product Type: *No Product type* /     Assessment: Juju improved her R shoulder flexion since last visit. She demonstrated increased abduction after treatment.     Plan: progress theraband; therapeutic exercise, therapeutic activity, self-care, home management, manual therapy, neuromuscular coordination, kinesiotaping, scar care  Goals  In 8-10 weeks, Juju will achieve the following goals:  She will be able to perform self-care, household, work, and leisure tasks with lower pain (1-3/10), 75% of the time.  She will improve right shoulder AROM in order to fully perform self-care, household, work and leisure tasks:    Flexion/Abduction 120 deg, Internal/External Rotation T10/60 deg.   MET EXCEPT FOR ABDUCTION  She will regain 4+/5 to 5/5 right shoulder strength in order to fully perform self-care, household, work, and leisure tasks.  She will be ready to return to work when released by her surgeon or me.  She will decrease her QuickDASH score by 20-30 points (33/50 at eval).  Patient's goals for therapy: regain full use of RUE, RTW    Plan of care was developed with input and agreement by the patient  Frequency: 1 x Week  Duration: 12 Weeks    Precautions:  Movement Restrictions: none at this point  Weight Bearing Status: NEW: no greater than 10 lbs    Subjective  DOS 1/28/25 (15 weeks p/o) RTW in 3 weeks  No pain. Ached a couple days ago. Looking forward to going back to work, but will finish therapy  first.    Prior level of function   WNL until 1/3/25 when she was hit with pain reaching up with RUE.     Patient Intake and Medical History form reviewed    Pain  0/10 in superior shoulder; 0-3/10 on average  taking OTC tylenol, meloxicam    Objective   Outcome Measure: QuickDASH: 33/50    Wound/scar: well-healed mildly adherent    Sensation  WNL     AROM RUE   Shoulder: degrees  flexion 129 (L 133)  extension 65   abduction 104 (L 147)  external rotation 82 (stopped measuring)  Internal rotation T10 (L T9)    Elbow/forearm/wrist/hand:  WNL    Strength   SHOULDER (MMT)    Flexion R L   Flexion 4+/5 5/5 all   Extension     Abduction 4+/5    IR at 0 degrees abd 4+/5    ER at 0 degrees abd 4/+5                 Treatment  Education: home exercise program, plan of care, activity modification, pain management, and pertinent anatomy     Therapeutic Exercise: Reassessed AROM, symptoms, functional status, HEP and compliance. UBE for gripping and general UE strengthening, level 4 x 4 min in each direction. Pulley for shoulder stretching in shoulder abduction with isometric holds at the top x 10. With her RUE maximally abducted by the pulley, I replaced the handle with 3 lbs that she carried around the clinic. In sidelying I stretched her R latissimus dorsi by pushing her scapula and hip apart as she clutched the corner of the mat table. Then she actively flexed/extended her RUE x 10. Next I again stretched her R latissimus dorsi by pushing her scapula and hip apart as she maximally abducted her arm, followed by active abd/dduction x 10. Then she did IR/ER strengthening with 2 lbs x 2 sets of 10.    HEP: scar massage, use of heat, pendulums, active scap motion, active cervical motion, passive shoulder flexion stretching in supine, passive shoulder flexion and abduction stretching on countertop or table, A/AA shoulder flexion to 90 degrees and active shoulder abduction working up to 90 degrees without shoulder hiking (done in  front of a mirror and with scapulae against the wall), and active IR/ER with elbow at her side all with 2 lbs, active/assisted shoulder flexion in supine with a cane only as far as she can without flexing elbows; 2 sets of 10 of each 2-3x/day, isometric shoulder strengthening (flexion, extension, abduction, adduction, internal rotation, external rotation, rowing) with yellow theraband holding each position for 30 seconds one time; 2 sets of 10 of each 2-3x/day.        OT Therapeutic Procedures Time Entry  Therapeutic Exercise Time Entry: 40

## 2025-05-28 ENCOUNTER — TREATMENT (OUTPATIENT)
Dept: OCCUPATIONAL THERAPY | Facility: HOSPITAL | Age: 65
End: 2025-05-28
Payer: COMMERCIAL

## 2025-05-28 DIAGNOSIS — M19.011 PRIMARY OSTEOARTHRITIS, RIGHT SHOULDER: ICD-10-CM

## 2025-05-28 PROCEDURE — 97110 THERAPEUTIC EXERCISES: CPT | Mod: GO

## 2025-05-28 NOTE — PROGRESS NOTES
Occupational Therapy Treatment    Patient Name: Juju Adams  MRN: [id number]  Today's Date: 5/28/2025     Problem List Items Addressed This Visit           ICD-10-CM    Primary osteoarthritis, right shoulder M19.011      DOI 1/28/25 (10 weeks p/o)    Insurance  Visit 15  of 19 until 6/30/25  Authorization: required  Insurance plan: Payor:  EMPLOYEE MED[employer]AN / Plan:  EMPLOYEE [employer]ICAL [employer]AN [employer] / Product Type: *No Product type* /     Assessment: Juju improved her R shoulder strength.     Plan: progress theraband, other strengthening; therapeutic exercise, therapeutic activity, self-care, home management, manual therapy, neuromuscular coordination, kinesiotaping, scar care  Goals  In 8-10 weeks, Juju will achieve the following goals:  She will be able to perform self-care, household, work, and leisure tasks with lower pain (1-3/10), 75% of the time.  She will improve right shoulder AROM in order to fully perform self-care, household, work and leisure tasks:    Flexion/Abduction 120 deg, Internal/External Rotation T10/60 deg.   MET EXCEPT FOR ABDUCTION  She will regain 4+/5 to 5/5 right shoulder strength in order to fully perform self-care, household, work, and leisure tasks.  She will be ready to return to work when released by her surgeon or me.  She will decrease her QuickDASH score by 20-30 points (33/50 at eval).  Patient's goals for therapy: regain full use of RUE, RTW    Plan of care was developed with input and agreement by the patient  Frequency: 1 x Week  Duration: 12 Weeks    Precautions:  Movement Restrictions: none at this point  Weight Bearing Status: NEW: no greater than 10 lbs    Subjective  DOS 1/28/25 (15 weeks p/o) RTW 6/30  No pain. Aches sometimes. Feels that pulley is no longer effective.    Prior level of function   WNL until 1/3/25 when she was hit with pain reaching up with RUE.     Patient Intake and Medical History form reviewed    Pain  0/10 in superior  shoulder; 0-3/10 on average  taking OTC tylenol, meloxicam    Objective   Outcome Measure: QuickDASH: 33/50    Wound/scar: well-healed mildly adherent    Sensation  WNL     AROM RUE   Shoulder: degrees  flexion 125 (L 133)  extension 65   abduction 104 (L 147)  external rotation 82 (stopped measuring)  Internal rotation T10 (L T9)    Elbow/forearm/wrist/hand:  WNL    Strength   SHOULDER (MMT)    Flexion R L   Flexion 5/5 5/5 all   Extension     Abduction 4/5    IR at 0 degrees abd 4+/5    ER at 0 degrees abd 4/+5                 Treatment  Education: home exercise program, plan of care, activity modification, pain management, and pertinent anatomy     Therapeutic Exercise: Reassessed AROM, strength, symptoms, functional status, HEP and compliance. UBE for gripping and general UE strengthening, level 4 x 4 min in each direction. Wall slides in abduction with RUE in pillowcase, with only fingertip contact with wall because when she takes her hand off the wall her arm immediately comes down, 1 set of 10; same with flexion, however she is able to lift her hand off the wall and maintain the flexion for 30 counts x 10 (added to HEP). Then with back to the wall she abducted her shoulder up the wall to 90 degrees and was able to lift the back of her hand off the wall and maintain the abduction x 5. Flexbar (yellow) for  and entire BUE strengthening. She will order a yellow one for herself.      HEP: scar massage, use of heat, pendulums, active scap motion, active cervical motion, passive shoulder flexion stretching in supine, passive shoulder flexion and abduction stretching on countertop or table, A/AA shoulder flexion to 90 degrees and active shoulder abduction working up to 90 degrees without shoulder hiking (done in front of a mirror and with scapulae against the wall), and active IR/ER with elbow at her side all with 2 lbs, active/assisted shoulder flexion in supine with a cane only as far as she can without  flexing elbows; 2 sets of 10 of each 2-3x/day, isometric shoulder strengthening (flexion, extension, abduction, adduction, internal rotation, external rotation, rowing) with yellow theraband holding each position for 30 seconds one time, wall slides in abd, flex, yellow flexbar; 2 sets of 10 of each 2-3x/day.

## 2025-05-30 ENCOUNTER — TELEPHONE (OUTPATIENT)
Dept: CARE COORDINATION | Facility: CLINIC | Age: 65
End: 2025-05-30
Payer: COMMERCIAL

## 2025-05-30 NOTE — PROGRESS NOTES
Date: 05/30/25    Dear Juju Hill, I am the patient Navigator that works with the [facility] and was calling to ask you a few questions. Please give me a call back at your convenience.     Sincerely,    Niya Lobato, Patient Navigator    [phone]

## 2025-05-30 NOTE — PROGRESS NOTES
Spoke with patient today to discuss her blood pressure. She said she is taking her medications as prescribed with no issues and is on a plant based vegan diet. She said she does exercise daily and takes her blood pressure at home.     She recently was asked to be cross-trained at her job and is concerned of the physical demands of it. She had a shoulder replacement and has other physical health concerns as well. She said she would discuss this further with the doctor at her follow-up appointment on Tuesday 6/3 at [facility].     She stated that she does not have any other concerns at this time.

## 2025-06-03 ENCOUNTER — TREATMENT (OUTPATIENT)
Dept: OCCUPATIONAL THERAPY | Facility: HOSPITAL | Age: 65
End: 2025-06-03
Payer: COMMERCIAL

## 2025-06-03 ENCOUNTER — OFFICE VISIT (OUTPATIENT)
Dept: PRIMARY CARE | Facility: HOSPITAL | Age: 65
End: 2025-06-03
Payer: COMMERCIAL

## 2025-06-03 VITALS
DIASTOLIC BLOOD PRESSURE: 83 MMHG | WEIGHT: 185 LBS | SYSTOLIC BLOOD PRESSURE: 126 MMHG | TEMPERATURE: 97 F | HEIGHT: 63 IN | OXYGEN SATURATION: 100 % | HEART RATE: 76 BPM | BODY MASS INDEX: 32.78 KG/M2

## 2025-06-03 DIAGNOSIS — R12 BURNING REFLUX: ICD-10-CM

## 2025-06-03 DIAGNOSIS — M19.011 PRIMARY OSTEOARTHRITIS, RIGHT SHOULDER: ICD-10-CM

## 2025-06-03 DIAGNOSIS — E78.2 MIXED HYPERLIPIDEMIA: Primary | ICD-10-CM

## 2025-06-03 DIAGNOSIS — Z00.00 HEALTHCARE MAINTENANCE: ICD-10-CM

## 2025-06-03 DIAGNOSIS — I10 PRIMARY HYPERTENSION: ICD-10-CM

## 2025-06-03 DIAGNOSIS — I25.10 CORONARY ARTERY DISEASE INVOLVING NATIVE CORONARY ARTERY OF NATIVE HEART, UNSPECIFIED WHETHER ANGINA PRESENT: ICD-10-CM

## 2025-06-03 DIAGNOSIS — E87.8 ELECTROLYTE ABNORMALITY: ICD-10-CM

## 2025-06-03 PROCEDURE — RXMED WILLOW AMBULATORY MEDICATION CHARGE

## 2025-06-03 PROCEDURE — 3079F DIAST BP 80-89 MM HG: CPT

## 2025-06-03 PROCEDURE — 1036F TOBACCO NON-USER: CPT

## 2025-06-03 PROCEDURE — 3008F BODY MASS INDEX DOCD: CPT

## 2025-06-03 PROCEDURE — 1159F MED LIST DOCD IN RCRD: CPT

## 2025-06-03 PROCEDURE — 1125F AMNT PAIN NOTED PAIN PRSNT: CPT

## 2025-06-03 PROCEDURE — 99215 OFFICE O/P EST HI 40 MIN: CPT

## 2025-06-03 PROCEDURE — 97140 MANUAL THERAPY 1/> REGIONS: CPT | Mod: GO

## 2025-06-03 PROCEDURE — 3074F SYST BP LT 130 MM HG: CPT

## 2025-06-03 PROCEDURE — 90472 IMMUNIZATION ADMIN EACH ADD: CPT | Mod: GC

## 2025-06-03 PROCEDURE — 99215 OFFICE O/P EST HI 40 MIN: CPT | Mod: GC,25

## 2025-06-03 PROCEDURE — 90471 IMMUNIZATION ADMIN: CPT | Mod: GC

## 2025-06-03 RX ORDER — CHLORTHALIDONE 25 MG/1
12.5 TABLET ORAL DAILY
Qty: 15 TABLET | Refills: 5 | Status: SHIPPED | OUTPATIENT
Start: 2025-06-03 | End: 2025-11-30

## 2025-06-03 RX ORDER — CARVEDILOL 25 MG/1
25 TABLET ORAL
Qty: 180 TABLET | Refills: 1 | Status: SHIPPED | OUTPATIENT
Start: 2025-06-03 | End: 2025-11-30

## 2025-06-03 RX ORDER — OMEPRAZOLE 40 MG/1
40 CAPSULE, DELAYED RELEASE ORAL DAILY
Qty: 90 CAPSULE | Refills: 0 | Status: SHIPPED | OUTPATIENT
Start: 2025-06-03 | End: 2025-09-04

## 2025-06-03 RX ORDER — ATORVASTATIN CALCIUM 80 MG/1
80 TABLET, FILM COATED ORAL NIGHTLY
Qty: 90 TABLET | Refills: 0 | Status: SHIPPED | OUTPATIENT
Start: 2025-06-03 | End: 2025-09-04

## 2025-06-03 ASSESSMENT — ENCOUNTER SYMPTOMS
LOSS OF SENSATION IN FEET: 0
OCCASIONAL FEELINGS OF UNSTEADINESS: 0
DEPRESSION: 0

## 2025-06-03 ASSESSMENT — PAIN SCALES - GENERAL: PAINLEVEL_OUTOF10: 8

## 2025-06-03 NOTE — PROGRESS NOTES
Chief complaint: Routine follow-up and medication refills    HPI:  Juju Adams is a 64 y.o. female with PMHx of hypertension, HLD, CAD (on ASA 81), migraine headaches, mild persistent asthma, renal cyst, and GERD presenting for follow-up.     The patient was last seen in clinic in March 2025.  At that time, she was 2 months out from a right reverse total shoulder arthroplasty and had started working with physical therapy.  She had also gained 10 pounds likely from a sedentary lifestyle after surgery and not working in the interim.  The plan was to continue with lifestyle modifications.  Her LDL cholesterol was not at goal, so she was started on Zetia. Her chlorthalidone was most likely the cause of leg cramping and electrolyte depletion, and chlorthalidone was decreased to 15 mg daily.    The patient states that she is doing well.  She states that her arm is aching today (currently 7 out of 10) because of the exercises.  She states she can bring her arm up by flexion above her head, that she cannot do it when she is raising her arms out to the side.  She cannot go above 90 degrees.  However she states overall her pain has gotten better and her neck, shoulder, arm feel better since having her surgery.  She also endorses that her spinal stenosis is acting up.  She denies any bowel or bladder incontinence, leg weakness, loss of sensation, or perianal anesthesia.  For her pain, she has tried Tylenol, diclofenac gel, multiple different rubs, lidocaine patches, and heating pads without much success.  She states that sometimes with her spinal stenosis, she can go for months before the pain lasts up. She is planned to go to back to work at the end of the month.     Her leg cramping has decreased and she has not had it for a while.  She states that her electrolytes previously kept dropping.  She takes liquid IV when she feels like she is going to cramp.  She also has potassium and magnesium supplements that she takes  with her vitamins every morning.    She states that her blood pressure has been good at home and she has been monitoring it with her blood pressure cuff.  She does note that her blood pressure routinely 120s over 70s to less than 90 systolic blood pressure is never over 130.  She says her blood pressure goes up if she is in extreme pain. She has made dietary changes by eating a vegan, plant-based diet for 6 months now.  She is also exercising more and has started going for walks.  She also works out on machines at home.    Medications:  Current Outpatient Medications   Medication Instructions    rpvbr-hagl-ApZBT-collag-mv-min (Kody, with collagen,) 7-7-1.5 gram powder in packet 2 Doses, Daily    ascorbic acid (VITAMIN C) 500 mg, oral, Daily    aspirin 81 mg, oral, Daily    atorvastatin (LIPITOR) 80 mg, oral, Nightly    carvedilol (Coreg) 25 mg tablet Take 1 tablet (25 mg) by mouth 2 times a day with meals.    chlorthalidone (HYGROTON) 12.5 mg, oral, Daily    cholecalciferol (VITAMIN D-3) 25 mcg, oral, Daily    electrolytes, oral solution 1 Dose, oral, Daily PRN, Liquid IV    famotidine (PEPCID) 20 mg, oral, 2 times daily    fexofenadine-pseudoephedrine (Allegra-D 24) 180-240 mg 24 hr tablet 1 tablet, oral, Daily PRN, Do not crush, chew, or split.    magnesium oxide 500 mg, oral, Daily    omeprazole (PRILOSEC) 40 mg, oral, Daily, Do not crush or chew.    potassium gluconate 595 mg (99 mg) tablet 1 tablet, oral, Daily    sucralfate (CARAFATE) 1 g, oral, 4 times daily before meals and nightly    SUMAtriptan (IMITREX) 25 mg, oral, Daily PRN, May repeat dose once in 2 hours if no relief.  Do not exceed 2 doses in 24 hours.    topiramate (TOPAMAX) 100 mg, Daily    zinc gluconate 50 mg tablet 50 mg of elemental zinc, Daily     Allergies:  RX Allergies[1]    Past medical history:  Medical History[2]    Surgical history:  Surgical History[3]    Family history:  Family History[4]    Social history:  She states she smoke  "almost 1 ppd since age [age]. Denies any alcohol use or any other substances.     Review of systems:  As per HPI    Vitals:  Vitals:    06/03/25 0924   BP: 126/83   Pulse: 76   Temp: 36.1 °C (97 °F)   SpO2: 100%     Physical exam:  GEN: Awake, alert, no acute distress  HEAD: Normocephalic, atraumatic  EYES: Pupils are equal, round, and reactive to light  CV: Normal S1 and S2, no murmurs, rubs, or gallops  PULM: Initially wheezing heard on exam, but then not appreciated on repeat auscultation  ABD: Soft, nondistended, nontender  EXTREM: No peripheral edema  MSK: Full flexion of the right and left shoulders. Limited abduction of the right shoulder to 90 degrees. Positive empty can test on the right.     Labs:  Lab Results   Component Value Date    WBC 9.4 01/29/2025    HGB 11.0 (L) 01/29/2025    HCT 34.3 (L) 01/29/2025    MCV 97 01/29/2025     01/29/2025     Lab Results   Component Value Date    GLUCOSE 132 (H) 01/29/2025    CALCIUM 8.3 (L) 01/29/2025     (L) 01/29/2025    K 4.0 01/29/2025    CO2 23 01/29/2025     01/29/2025    BUN 16 01/29/2025    CREATININE 0.56 01/29/2025     Lab Results   Component Value Date    HGBA1C 5.4 01/22/2025      Lab Results   Component Value Date    CHOL 182 08/01/2024    CHOL 241 (H) 09/13/2023    CHOL 210 (H) 03/19/2021     Lab Results   Component Value Date    HDL 58.3 08/01/2024    HDL 67.6 09/13/2023    HDL 55.7 03/19/2021     Lab Results   Component Value Date    LDLCALC 108 (H) 08/01/2024     Lab Results   Component Value Date    TRIG 81 08/01/2024    TRIG 107 09/13/2023    TRIG 93 03/19/2021     No components found for: \"CHOLHDL\"    Imaging:  Imaging  No results found.    Cardiology, Vascular, and Other Imaging  No other imaging results found for the past 7 days    Assessment and plan:  Juju Adams is a 64 y.o. female with hypertension, HLD, CAD (on ASA 81), migraine headaches, mild persistent asthma, renal cyst, and GERD presenting for follow-up. Her " blood pressure is under control with her current regimen and by implementing lifestyle interventions, she has lost about 8 pounds since her last visit.     #HTN  #Leg cramping  #Iatrogenic electrolyte depletion (hypokalemia)  - /83 today  - Leg cramping and electrolyte depletion likely from chlorthalidone -> chlorthalidone decreased to 15 mg daily during her last visit. Changed to generic.   Plan:  - Continue coreg 25 BID  - Continue chlorthalidone at 12.5 mg daily  - CMP, Mg, Phos ordered  - Will continue regimen as is. Can continue electrolyte supplementation pending repeat labs     #CAD  #HLD  - Last lipid 8/2024: HDL 58, , TG 81  - LHC 3/2013: mild diffuse coronary disease, normal LV pressures  - Stress echo (3/2021): resting EF 60-65%, stress EF > 75%, normal stress echo  - CTA 2/21/24 with mild-moderate RCA and LAD stenosis and CT score of 394  - Follows with cardiology, last seen 3/2024, previously had very rare chest pain relieved by nitroglycerin but deemed likely noncardiac; denies recent CP  Plan:  - On atorva 80 -> the patient states that she has ran out of the medication for the last two weeks. Reordered. Will repeat lipid panel at least 1 month from resuming the medication before resuming Zetia.   - Aspirin 81  - Discontinued Zetia. Will consider resuming after repeat labs (patient states she has not taken this medication).   - Due for repeat lipid panel     #Miner's Esophagus  #Gastritis  #GERD  - EGD in 11/2024 showed erosive gastritis s/p H. Pylori biopsy, irregular z-line with single tongue C0M1 Miner's esophagus  - Pathology showed antrum body biopsy with gastric mucosa with no significant pathologic findings and the esophagogastric junction biopsy with junctional mucosa with intestinal metaplasia  Plan:   - Omeprazole 40 mg every day  - Pepcid and sucralfate  - Follow-up with GI regarding timing of surveillance (5 years)    #Migraines  - On topiramate for preventive treatment  -  On sumatriptin for abortive    #Mild intermittent asthma?  #COPD?  - Has chart diagnoses of COPD and asthma  - No PFTs  - Not on any inhalers  Plan:   - Follow-up at next visit regarding symptoms    HEALTH MAINTENANCE   Antibody Testing   HIV: ordered  Hepatitis C: ordered  Vaccines  Influenza: Next season  Shingles: Due  Tdap: Due (ordered and received in clinic today)  Pneumonia: PPSV23 - 5/2022, 10/2022; PCV-20 (ordered and received in clinic today)  COVID: 2021 x 3, 2022 x 2  Cancer screening   Colonoscopy: 2023 - sessile polyp in the hepatic flexure, and internal hemorrhoids -> due in 2030  Pap Smear: Last in 2023 (aged out, but will need reassessment of whether abnormal paps are present prior to discontinuing screening  Mammogram: Due 8/2025    Low dose Chest CT: Not indicated  DEXA scan: Due (has been ordered)     Plan     Follow-up in 6 months.    Patient and plan discussed with attending physician, Dr. Collins.    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    Coronary artery disease     GERD (gastroesophageal reflux disease)     Hyperlipidemia     Hypertension     Migraines     Other specified cough 02/18/2019    Post-viral cough syndrome    Personal history of other diseases of the respiratory system 12/26/2017    History of acute bronchitis with bronchospasm    Personal history of other diseases of the respiratory system 07/31/2018    History of acute sinusitis    Personal history of other drug therapy 11/01/2016    History of influenza vaccination    Personal history of other infectious and parasitic diseases 04/13/2015    History of herpes zoster    Vision loss    [3]   Past Surgical History:  Procedure Laterality Date    CARDIAC  CATHETERIZATION      COLONOSCOPY      TOTAL HIP ARTHROPLASTY Bilateral 02/13/2014    Total Hip Replacement    TOTAL KNEE ARTHROPLASTY Left     TUBAL LIGATION  01/04/2018    Tubal Ligation    UPPER GASTROINTESTINAL ENDOSCOPY     [4]   Family History  Problem Relation Name Age of Onset    Hypertension Mother      Heart attack Mother      Hypertension Father      Heart attack Father      Stroke Father      Diabetes Sister      Hypertension Sister      Seizures Sister

## 2025-06-03 NOTE — PROGRESS NOTES
I saw and evaluated the patient. I personally obtained the key and critical portions of the history and physical exam or was physically present for key and critical portions performed by the resident/fellow. I reviewed the resident/fellow's documentation and discussed the patient with the resident/fellow. I agree with the resident/fellow's medical decision making as documented in the note.    Rosemary Collins MD

## 2025-06-03 NOTE — PATIENT INSTRUCTIONS
Dear Ms. Bryan,    As discussed today, our plan is:     Labs - we ordered labs today and will call you with any abnormal results. If you have any questions or concerns prior to us calling feel free to call our office to have your questions addressed. You will get your lipid levels done at least 1 month from now since you have not been taking the atorvastatin. This is so we can check your levels prior to starting the Zetia, another medication to lower your cholesterol.   Medication changes: Continue taking medications as prescribed. The chlorthalidone was ordered for 15 mg, but is 12.5 mg for generic. You may continue taking 12.5 mg daily for generic.   Consultations - you were referred to see the following specialists: None. You should receive a call from central scheduling in the next few days if you do not receive a call within 3-5 business days please call [phone] to schedule your appointment.   4.   If you smoke or use other tobacco products, take steps to quit. Call [phone] for more information or to set up an appointment with  [facility]. The [place] [facility] Quit Line is a free resource for people who don’t have insurance, receive Medicaid, pregnant women, or members of the [place] [facility]. Call 2-989-QUIT-NOW or 1-311.349.6431.  5.   You received the Tdap vaccine and the pneumonia vaccine in clinic today.   6.   Please schedule your DEXA scan as soon as possible.     Please come back to see us in: 6 months    ------  If you have any problems or questions, please contact the clinic at [phone] to leave a message. Our fax number is [phone]. If your issue cannot wait until the next business day, please go to urgent care or the emergency department.     I also strongly urge all of my patients to register for [email or web address] by going to: [email or web address]  (The  staff can also send you a text/email link to register when you check  out).    No shows: It is understandable if you are unable to make it to a visit, but please cancel your appointment instead of not showing up. This helps to give other patients access to primary care and keeps wait times low.        [facility]   [phone]

## 2025-06-03 NOTE — PROGRESS NOTES
Occupational Therapy Treatment    Patient Name: Juju Adams  MRN: [id number]  Today's Date: 6/3/2025  Time Calculation  Start Time: 0830  Stop Time: 0915  Time Calculation (min): 45 min  Problem List Items Addressed This Visit           ICD-10-CM    Primary osteoarthritis, right shoulder M19.011      DOI 1/28/25    Insurance  Visit 16 of 19 until 6/30/25  Authorization: required  Insurance plan: Payor:  EMPLOYEE MEDICAL PLAN / Plan:  EMPLOYEE MEDICAL PLAN [employer] / Product Type: *No Product type* /     Assessment: Juju came in with increased pain, and left feeling much better.     Plan: progress theraband, other strengthening; therapeutic exercise, therapeutic activity, self-care, home management, manual therapy, neuromuscular coordination, kinesiotaping, scar care  Goals  In 8-10 weeks, Juju will achieve the following goals:  She will be able to perform self-care, household, work, and leisure tasks with lower pain (1-3/10), 75% of the time.  She will improve right shoulder AROM in order to fully perform self-care, household, work and leisure tasks:    Flexion/Abduction 120 deg, Internal/External Rotation T10/60 deg.   MET EXCEPT FOR ABDUCTION  She will regain 4+/5 to 5/5 right shoulder strength in order to fully perform self-care, household, work, and leisure tasks.  She will be ready to return to work when released by her surgeon or me.  She will decrease her QuickDASH score by 20-30 points (33/50 at eval).  Patient's goals for therapy: regain full use of RUE, RTW    Plan of care was developed with input and agreement by the patient  Frequency: 1 x Week  Duration: 12 Weeks    Precautions:  Movement Restrictions: none at this point  Weight Bearing Status: NEW: no greater than 10 lbs    Subjective  DOS 1/28/25 (4 months p/o) RTW 6/30  R shoulder has been hurting since last visit. She is stressed about returning to work in 3 weeks, and realizes that pain increases with stress.  Pain  mostly with abduction, occurring at upper trap and right side of neck. Also along scapular spine across to posterior upper arm.    Prior level of function   WNL until 1/3/25 when she was hit with pain reaching up with RUE.     Patient Intake and Medical History form reviewed    Pain  6-7/10 in superior shoulder; 0-3/10 on average  taking OTC tylenol, meloxicam    Objective   Outcome Measure: QuickDASH: 33/50    Wound/scar: well-healed mildly adherent    Sensation  WNL     AROM RUE   Shoulder: degrees  flexion 125 (L 133)  extension 65   abduction 104 (L 147)  external rotation 82 (stopped measuring)  Internal rotation T10 (L T9)    Elbow/forearm/wrist/hand:  WNL    Strength   SHOULDER (MMT)    Flexion R L   Flexion 5/5 5/5 all   Extension     Abduction 4/5    IR at 0 degrees abd 4+/5    ER at 0 degrees abd 4/+5                 Treatment  Education: home exercise program, plan of care, activity modification, pain management, and pertinent anatomy     Modalities: Moist heat to R thorax and shoulder/neck to prepare for treatment and for pain relief. Verbally reviewed HEP.  Manual: Reassessed AROM, symptoms, functional status, HEP and compliance. STM and trigger point releases about R upper quadrant for pain relief and to relax tissues to allow greater joint motion.      HEP: scar massage, use of heat, pendulums, active scap motion, active cervical motion, passive shoulder flexion stretching in supine, passive shoulder flexion and abduction stretching on countertop or table, A/AA shoulder flexion to 90 degrees and active shoulder abduction working up to 90 degrees without shoulder hiking (done in front of a mirror and with scapulae against the wall), and active IR/ER with elbow at her side all with 2 lbs, active/assisted shoulder flexion in supine with a cane only as far as she can without flexing elbows; 2 sets of 10 of each 2-3x/day, isometric shoulder strengthening (flexion, extension, abduction, adduction, internal  rotation, external rotation, rowing) with yellow theraband holding each position for 30 seconds one time, wall slides in abd, flex, yellow flexbar; 2 sets of 10 of each 2-3x/day.        OT Therapeutic Procedures Time Entry  Manual Therapy Time Entry: 33  OT Modalities Time Entry  Hot/Cold Pack Time Entry: 12 (not charged)

## 2025-06-06 ENCOUNTER — PHARMACY VISIT (OUTPATIENT)
Dept: PHARMACY | Facility: CLINIC | Age: 65
End: 2025-06-06
Payer: COMMERCIAL

## 2025-06-10 ENCOUNTER — DOCUMENTATION (OUTPATIENT)
Dept: OCCUPATIONAL THERAPY | Facility: HOSPITAL | Age: 65
End: 2025-06-10
Payer: COMMERCIAL

## 2025-06-10 NOTE — PROGRESS NOTES
Juju no-showed this morning, which is totally unlike her. I called and found her on her way here, thinking the appointment was at 9:15 instead of 8:30. She will call back and reschedule.

## 2025-06-12 ENCOUNTER — TREATMENT (OUTPATIENT)
Dept: OCCUPATIONAL THERAPY | Facility: HOSPITAL | Age: 65
End: 2025-06-12
Payer: COMMERCIAL

## 2025-06-12 DIAGNOSIS — M19.011 PRIMARY OSTEOARTHRITIS, RIGHT SHOULDER: ICD-10-CM

## 2025-06-12 PROCEDURE — 97110 THERAPEUTIC EXERCISES: CPT | Mod: GO

## 2025-06-12 NOTE — PROGRESS NOTES
Occupational Therapy Treatment    Patient Name: Juju Adams  MRN: [id number]  Today's Date: 6/12/2025  Time Calculation  Start Time: 0830  Stop Time: 0915  Time Calculation (min): 45 min  Problem List Items Addressed This Visit           ICD-10-CM    Primary osteoarthritis, right shoulder M19.011      DOI 1/28/25    Insurance  Visit 17 of 19 until 6/30/25  Authorization: required  Insurance plan: Payor:  EMPLOYEE MEDICAL PLAN / Plan:  EMPLOYEE MEDICAL PLAN [employer] / Product Type: *No Product type* /     Assessment: Juju came in without pain, worked hard and left without pain.     Plan: focus on abduction; therapeutic exercise, therapeutic activity, self-care, home management, manual therapy, neuromuscular coordination, kinesiotaping, scar care  Goals  In 8-10 weeks, Juju will achieve the following goals:  She will be able to perform self-care, household, work, and leisure tasks with lower pain (1-3/10), 75% of the time.  She will improve right shoulder AROM in order to fully perform self-care, household, work and leisure tasks:    Flexion/Abduction 120 deg, Internal/External Rotation T10/60 deg.   MET EXCEPT FOR ABDUCTION  She will regain 4+/5 to 5/5 right shoulder strength in order to fully perform self-care, household, work, and leisure tasks.  She will be ready to return to work when released by her surgeon or me.  She will decrease her QuickDASH score by 20-30 points (33/50 at eval).  Patient's goals for therapy: regain full use of RUE, RTW    Plan of care was developed with input and agreement by the patient  Frequency: 1 x Week  Duration: 12 Weeks    Precautions:  Movement Restrictions: none at this point  Weight Bearing Status: NEW: no greater than 10 lbs    Subjective  DOS 1/28/25 (4 months p/o) RTW 6/30  No pain right now. Looking forward to RTW.    Prior level of function   WNL until 1/3/25 when she was hit with pain reaching up with RUE.     Patient Intake and Medical History  form reviewed    Pain  6-7/10 in superior shoulder; 0-3/10 on average  taking OTC tylenol, meloxicam    Objective   Outcome Measure: QuickDASH: 33/50    Wound/scar: well-healed mildly adherent    Sensation  WNL     AROM RUE   Shoulder: degrees  flexion 125 (L 133)  extension 65   abduction 94 (L 147) 6/10 pain in neck for days if she forces it  external rotation 82 (stopped measuring)  Internal rotation T10 (L T9)    Elbow/forearm/wrist/hand:  WNL    Strength   SHOULDER (MMT)    Flexion R L   Flexion 5/5 5/5 all   Extension     Abduction 4/5    IR at 0 degrees abd 5/5    ER at 0 degrees abd 4/+5                 Treatment  Education: home exercise program, plan of care, activity modification, pain management, and pertinent anatomy     Manual: Reassessed AROM, strength, symptoms, functional status, HEP and compliance. Advanced to green theraband for shoulder strengthening. She performed external rotation and abduction strengthening in standing with the green theraband until fatigue. With yellow theraband she strengthened her periscapular muscles in the T position focusing on scapular motion before arm motion. Shoulder circles with medium red therapy ball in both directions alternating arms. She then rolled the ball up the wall for flexion stretching, lifting her R hand off the ball and holding it in space until fatigue. Shoulder arch in both directions with both arms, standing to the side to emphasize abduction. Massage gun to bilateral upper traps and R neck muscles for pain relief and tissue relaxation.    HEP: scar massage, use of heat, pendulums, active scap motion, active cervical motion, passive shoulder flexion stretching in supine, passive shoulder flexion and abduction stretching on countertop or table, A/AA shoulder flexion to 90 degrees and active shoulder abduction working up to 90 degrees without shoulder hiking (done in front of a mirror and with scapulae against the wall), and active IR/ER with elbow at  her side all with 2 lbs, active/assisted shoulder flexion in supine with a cane only as far as she can without flexing elbows; 2 sets of 10 of each 2-3x/day, isometric shoulder strengthening (flexion, extension, abduction, adduction, internal rotation, external rotation, rowing) with red theraband holding each position for 30 seconds one time, wall slides in abd, flex, yellow flexbar; 2 sets of 10 of each 2-3x/day.        OT Therapeutic Procedures Time Entry  Therapeutic Exercise Time Entry: 45

## 2025-06-17 ENCOUNTER — TREATMENT (OUTPATIENT)
Dept: OCCUPATIONAL THERAPY | Facility: HOSPITAL | Age: 65
End: 2025-06-17
Payer: COMMERCIAL

## 2025-06-17 DIAGNOSIS — M19.011 PRIMARY OSTEOARTHRITIS, RIGHT SHOULDER: ICD-10-CM

## 2025-06-17 PROCEDURE — 97110 THERAPEUTIC EXERCISES: CPT | Mod: GO

## 2025-06-17 NOTE — PROGRESS NOTES
Occupational Therapy Treatment    Patient Name: Juju Adams  MRN: [id number]  Today's Date: 6/17/2025  Time Calculation  Start Time: 0915  Stop Time: 1000  Time Calculation (min): 45 min  Problem List Items Addressed This Visit           ICD-10-CM    Primary osteoarthritis, right shoulder M19.011      DOI 1/28/25    Insurance  Visit 18 of 19 until 6/30/25  (Used 12 visits from 3/4/25 to 5/31/25)  Authorization: required  Insurance plan: Payor:  EMPLOYEE [employer]AN / Plan:  EMPL[employer]EE [employer]AN [employer] / Product Type: *No Product type* /     Assessment: Juju came in without pain, worked hard and left without pain.     Plan: discharge to home program after next visit; focus on abduction  Goals  In 8-10 weeks, Juju will achieve the following goals:  She will be able to perform self-care, household, work, and leisure tasks with lower pain (1-3/10), 75% of the time.  She will improve right shoulder AROM in order to fully perform self-care, household, work and leisure tasks:    Flexion/Abduction 120 deg, Internal/External Rotation T10/60 deg.   MET EXCEPT FOR ABDUCTION  She will regain 4+/5 to 5/5 right shoulder strength in order to fully perform self-care, household, work, and leisure tasks.  She will be ready to return to work when released by her surgeon or me.  She will decrease her QuickDASH score by 20-30 points (33/50 at eval).  Patient's goals for therapy: regain full use of RUE, RTW    Plan of care was developed with input and agreement by the patient  Frequency: 1 x Week  Duration: 12 Weeks    Precautions:  Movement Restrictions: none at this point  Weight Bearing Status: no greater than 10 lbs    Subjective  DOS 1/28/25 (4 months p/o) RTW 6/30  She thinks the massage gun caused her R shoulder and neck pain later that day and the next, though it felt good while I was doing the massage. She's better now.    Prior level of function   WNL until 1/3/25 when she was hit with pain  reaching up with RUE.     Patient Intake and Medical History form reviewed    Pain  0-3/10 on average  taking OTC tylenol, meloxicam    Objective   Outcome Measure: QuickDASH: 33/50    Wound/scar: well-healed mildly adherent    Sensation  WNL     AROM RUE   Shoulder: degrees  flexion 125 (L 133)  extension 65   abduction 95 (L 147) 6/10 pain in neck for days if she forces it (112 after stretching)  external rotation 82 (stopped measuring)  Internal rotation T10 (L T9)    Elbow/forearm/wrist/hand:  WNL    Strength   SHOULDER (MMT)    Flexion R L   Flexion 5/5 5/5 all   Extension     Abduction 4/5    IR at 0 degrees abd 5/5    ER at 0 degrees abd 4/+5                 Treatment  Education: home exercise program, plan of care, activity modification, pain management, and pertinent anatomy     Therapeutic Exercises: Reassessed AROM, strength, symptoms, functional status, HEP and compliance. UBE for gripping and general UE strengthening, level 5 x 3 min in each direction for warm up. Pulley for shoulder stretching in shoulder flexion and abduction, with 30 second isometric holds at the top x 10 each. She performed external rotation and abduction strengthening in standing with the green theraband until fatigue. With yellow theraband she strengthened her periscapular muscles in the T position focusing on scapular motion before arm motion.     HEP: scar massage, use of heat, pendulums, active scap motion, active cervical motion, passive shoulder flexion stretching in supine, passive shoulder flexion and abduction stretching on countertop or table, A/AA shoulder flexion to 90 degrees and active shoulder abduction working up to 90 degrees without shoulder hiking (done in front of a mirror and with scapulae against the wall), and active IR/ER with elbow at her side all with 2 lbs, active/assisted shoulder flexion in supine with a cane only as far as she can without flexing elbows; 2 sets of 10 of each 2-3x/day, isometric  shoulder strengthening (flexion, extension, abduction, adduction, internal rotation, external rotation, rowing) with red theraband holding each position for 30 seconds one time, wall slides in abd, flex, yellow flexbar; 2 sets of 10 of each 2-3x/day.        OT Therapeutic Procedures Time Entry  Therapeutic Exercise Time Entry: 45

## 2025-06-19 ENCOUNTER — APPOINTMENT (OUTPATIENT)
Dept: OCCUPATIONAL THERAPY | Facility: HOSPITAL | Age: 65
End: 2025-06-19
Payer: COMMERCIAL

## 2025-06-19 ENCOUNTER — TREATMENT (OUTPATIENT)
Dept: OCCUPATIONAL THERAPY | Facility: HOSPITAL | Age: 65
End: 2025-06-19
Payer: COMMERCIAL

## 2025-06-19 DIAGNOSIS — M19.011 PRIMARY OSTEOARTHRITIS, RIGHT SHOULDER: ICD-10-CM

## 2025-06-19 PROCEDURE — 97530 THERAPEUTIC ACTIVITIES: CPT | Mod: GO

## 2025-06-19 PROCEDURE — 97110 THERAPEUTIC EXERCISES: CPT | Mod: GO

## 2025-06-19 NOTE — PROGRESS NOTES
Occupational Therapy Treatment    Patient Name: Juju Adams  MRN: [id number]  Today's Date: 6/19/2025     Problem List Items Addressed This Visit           ICD-10-CM    Primary osteoarthritis, right shoulder M19.011      DOI 1/28/25    Insurance  Visit 19 of 19 until 6/30/25  (Used 12 visits from 3/4/25 to 5/31/25)  Authorization: required  Insurance plan: Payor:  EMPLOYEE [employer]AN / Plan:  EMPLOYEE [employer]ICAL PLAN [employer] / Product Type: *No Product type* /     Assessment: Juju has worked diligently and has met or nearly met all goals. She will continue working on her right shoulder independently and will contact me with issues. She is ready to return to work in 10 days.     Plan: discharge to home program   Goals  All met or nearly met  She will be able to perform self-care, household, work, and leisure tasks with lower pain (1-3/10), 75% of the time. MET  She will improve right shoulder AROM in order to fully perform self-care, household, work and leisure tasks:    Flexion/Abduction 120 deg, Internal/External Rotation T10/60 deg.   MET EXCEPT FOR ABDUCTION  She will regain 4+/5 to 5/5 right shoulder strength in order to fully perform self-care, household, work, and leisure tasks.  MET  She will be ready to return to work when released by her surgeon or me.  MET  She will decrease her QuickDASH score by 20-30 points (33/50 at eval, 19/18.18 at discharge today).  NEARLY MET  Patient's goals for therapy: regain full use of RUE, RTW.  MET    Plan of care was developed with input and agreement by the patient  Frequency: 1 x Week  Duration: 12 Weeks    Precautions:  Movement Restrictions: none at this point  Weight Bearing Status: no greater than 10 lbs    Subjective  DOS 1/28/25 (nearly 5 months p/o) RTW 6/30  She thinks the massage gun caused her R shoulder and neck pain later that day and the next, though it felt good while I was doing the massage. She's better now.    Prior level of  function   WNL until 1/3/25 when she was hit with pain reaching up with RUE.     Patient Intake and Medical History form reviewed    Pain  0-3/10 on average  taking OTC tylenol, meloxicam    Objective   Outcome Measure: QuickDASH: 33/50    Wound/scar: well-healed mildly adherent    Sensation  WNL     AROM RUE   Shoulder: degrees  flexion 125 (L 133)  extension 65   abduction 106 (L 147) 6/10 pain in neck for days if she forces it (112 after stretching)  external rotation 82 (stopped measuring)  Internal rotation T10 (L T9)    Elbow/forearm/wrist/hand:  WNL    Strength   SHOULDER (MMT)    Flexion R L   Flexion 5/5 5/5 all   Extension     Abduction 4+/5    IR at 0 degrees abd 5/5    ER at 0 degrees abd 4+/+5                  Treatment  Education: home exercise program, plan of care, activity modification, pain management, and pertinent anatomy     Therapeutic Exercises: Reassessed AROM, strength, symptoms, functional status, HEP and compliance. UBE for gripping and general UE strengthening, level 5 x 3 min in each direction for warm up. Countertop stretch and thread the needle focusing on shoulder blade protraction and retraction x 10. She performed isometric external rotation and abduction strengthening in standing with the green theraband x 10 with 30 second holds. With yellow theraband she strengthened her periscapular muscles in the T position focusing on scapular motion before arm motion.   Therapeutic Activity: Dart throwing with affected UE as full arm dynamic functional task facing the board and some using shoulder abduction.    HEP: scar massage, use of heat, pendulums, active scap motion, active cervical motion, passive shoulder flexion stretching in supine, passive shoulder flexion and abduction stretching on countertop or table, A/AA shoulder flexion to 90 degrees and active shoulder abduction working up to 90 degrees without shoulder hiking (done in front of a mirror and with scapulae against the wall),  and active IR/ER with elbow at her side all with 2 lbs, active/assisted shoulder flexion in supine with a cane only as far as she can without flexing elbows; 2 sets of 10 of each 2-3x/day, isometric shoulder strengthening (flexion, extension, abduction, adduction, internal rotation, external rotation, rowing) with red theraband holding each position for 30 seconds one time, wall slides in abd, flex, yellow flexbar; 2 sets of 10 of each 2-3x/day.

## 2025-06-26 ENCOUNTER — APPOINTMENT (OUTPATIENT)
Dept: OCCUPATIONAL THERAPY | Facility: HOSPITAL | Age: 65
End: 2025-06-26
Payer: COMMERCIAL

## 2025-07-02 ENCOUNTER — CLINICAL SUPPORT (OUTPATIENT)
Dept: EMERGENCY MEDICINE | Facility: HOSPITAL | Age: 65
End: 2025-07-02
Payer: COMMERCIAL

## 2025-07-02 ENCOUNTER — HOSPITAL ENCOUNTER (EMERGENCY)
Facility: HOSPITAL | Age: 65
Discharge: HOME | End: 2025-07-03
Attending: EMERGENCY MEDICINE
Payer: COMMERCIAL

## 2025-07-02 DIAGNOSIS — I10 HYPERTENSION, UNSPECIFIED TYPE: Primary | ICD-10-CM

## 2025-07-02 DIAGNOSIS — R51.9 NONINTRACTABLE HEADACHE, UNSPECIFIED CHRONICITY PATTERN, UNSPECIFIED HEADACHE TYPE: ICD-10-CM

## 2025-07-02 DIAGNOSIS — H11.32 SUBCONJUNCTIVAL HEMORRHAGE OF LEFT EYE: ICD-10-CM

## 2025-07-02 LAB
ALBUMIN SERPL BCP-MCNC: 4.3 G/DL (ref 3.4–5)
ALP SERPL-CCNC: 84 U/L (ref 33–136)
ALT SERPL W P-5'-P-CCNC: 14 U/L (ref 7–45)
ANION GAP SERPL CALC-SCNC: 13 MMOL/L (ref 10–20)
AST SERPL W P-5'-P-CCNC: 28 U/L (ref 9–39)
BASOPHILS # BLD AUTO: 0.03 X10*3/UL (ref 0–0.1)
BASOPHILS NFR BLD AUTO: 0.5 %
BILIRUB SERPL-MCNC: 0.7 MG/DL (ref 0–1.2)
BUN SERPL-MCNC: 9 MG/DL (ref 6–23)
CALCIUM SERPL-MCNC: 9.9 MG/DL (ref 8.6–10.6)
CARDIAC TROPONIN I PNL SERPL HS: <3 NG/L (ref 0–34)
CHLORIDE SERPL-SCNC: 94 MMOL/L (ref 98–107)
CO2 SERPL-SCNC: 28 MMOL/L (ref 21–32)
CREAT SERPL-MCNC: 0.65 MG/DL (ref 0.5–1.05)
EGFRCR SERPLBLD CKD-EPI 2021: >90 ML/MIN/1.73M*2
EOSINOPHIL # BLD AUTO: 0.24 X10*3/UL (ref 0–0.7)
EOSINOPHIL NFR BLD AUTO: 4.3 %
ERYTHROCYTE [DISTWIDTH] IN BLOOD BY AUTOMATED COUNT: 12.3 % (ref 11.5–14.5)
GLUCOSE SERPL-MCNC: 94 MG/DL (ref 74–99)
HCT VFR BLD AUTO: 40.4 % (ref 36–46)
HGB BLD-MCNC: 13.7 G/DL (ref 12–16)
IMM GRANULOCYTES # BLD AUTO: 0.01 X10*3/UL (ref 0–0.7)
IMM GRANULOCYTES NFR BLD AUTO: 0.2 % (ref 0–0.9)
LYMPHOCYTES # BLD AUTO: 2.15 X10*3/UL (ref 1.2–4.8)
LYMPHOCYTES NFR BLD AUTO: 38.6 %
MCH RBC QN AUTO: 30.6 PG (ref 26–34)
MCHC RBC AUTO-ENTMCNC: 33.9 G/DL (ref 32–36)
MCV RBC AUTO: 90 FL (ref 80–100)
MONOCYTES # BLD AUTO: 0.43 X10*3/UL (ref 0.1–1)
MONOCYTES NFR BLD AUTO: 7.7 %
NEUTROPHILS # BLD AUTO: 2.71 X10*3/UL (ref 1.2–7.7)
NEUTROPHILS NFR BLD AUTO: 48.7 %
NRBC BLD-RTO: 0 /100 WBCS (ref 0–0)
PLATELET # BLD AUTO: 260 X10*3/UL (ref 150–450)
POTASSIUM SERPL-SCNC: 4.5 MMOL/L (ref 3.5–5.3)
PROT SERPL-MCNC: 7.7 G/DL (ref 6.4–8.2)
RBC # BLD AUTO: 4.47 X10*6/UL (ref 4–5.2)
SODIUM SERPL-SCNC: 130 MMOL/L (ref 136–145)
WBC # BLD AUTO: 5.6 X10*3/UL (ref 4.4–11.3)

## 2025-07-02 PROCEDURE — 2500000001 HC RX 250 WO HCPCS SELF ADMINISTERED DRUGS (ALT 637 FOR MEDICARE OP)

## 2025-07-02 PROCEDURE — 2500000004 HC RX 250 GENERAL PHARMACY W/ HCPCS (ALT 636 FOR OP/ED)

## 2025-07-02 PROCEDURE — 36415 COLL VENOUS BLD VENIPUNCTURE: CPT | Performed by: EMERGENCY MEDICINE

## 2025-07-02 PROCEDURE — 99285 EMERGENCY DEPT VISIT HI MDM: CPT | Performed by: EMERGENCY MEDICINE

## 2025-07-02 PROCEDURE — 85025 COMPLETE CBC W/AUTO DIFF WBC: CPT | Performed by: EMERGENCY MEDICINE

## 2025-07-02 PROCEDURE — 80053 COMPREHEN METABOLIC PANEL: CPT | Performed by: EMERGENCY MEDICINE

## 2025-07-02 PROCEDURE — 96361 HYDRATE IV INFUSION ADD-ON: CPT

## 2025-07-02 PROCEDURE — 96360 HYDRATION IV INFUSION INIT: CPT

## 2025-07-02 PROCEDURE — 93005 ELECTROCARDIOGRAM TRACING: CPT

## 2025-07-02 PROCEDURE — 84484 ASSAY OF TROPONIN QUANT: CPT | Performed by: EMERGENCY MEDICINE

## 2025-07-02 RX ORDER — ACETAMINOPHEN 325 MG/1
975 TABLET ORAL ONCE
Status: COMPLETED | OUTPATIENT
Start: 2025-07-02 | End: 2025-07-02

## 2025-07-02 RX ORDER — CARVEDILOL 12.5 MG/1
25 TABLET ORAL ONCE
Status: COMPLETED | OUTPATIENT
Start: 2025-07-02 | End: 2025-07-02

## 2025-07-02 RX ADMIN — CARVEDILOL 25 MG: 12.5 TABLET, FILM COATED ORAL at 22:41

## 2025-07-02 RX ADMIN — ACETAMINOPHEN 975 MG: 325 TABLET ORAL at 22:41

## 2025-07-02 RX ADMIN — SODIUM CHLORIDE 1000 ML: 0.9 INJECTION, SOLUTION INTRAVENOUS at 22:41

## 2025-07-02 NOTE — ED TRIAGE NOTES
Pt has hx of HTN, last night she said a blood vessel burst in her left eye, she checked her blood pressure and it was 189/89, and his been running high since then. Pt c/o of headache, neck tightness, and dizziness.

## 2025-07-03 ENCOUNTER — APPOINTMENT (OUTPATIENT)
Dept: RADIOLOGY | Facility: HOSPITAL | Age: 65
End: 2025-07-03
Payer: COMMERCIAL

## 2025-07-03 VITALS
RESPIRATION RATE: 14 BRPM | HEART RATE: 68 BPM | HEIGHT: 63 IN | OXYGEN SATURATION: 97 % | SYSTOLIC BLOOD PRESSURE: 101 MMHG | TEMPERATURE: 97.7 F | BODY MASS INDEX: 32.43 KG/M2 | DIASTOLIC BLOOD PRESSURE: 59 MMHG | WEIGHT: 183 LBS

## 2025-07-03 DIAGNOSIS — E78.2 MIXED HYPERLIPIDEMIA: ICD-10-CM

## 2025-07-03 LAB
ATRIAL RATE: 70 BPM
P AXIS: 67 DEGREES
P OFFSET: 188 MS
P ONSET: 131 MS
PR INTERVAL: 156 MS
Q ONSET: 209 MS
QRS COUNT: 11 BEATS
QRS DURATION: 88 MS
QT INTERVAL: 420 MS
QTC CALCULATION(BAZETT): 453 MS
QTC FREDERICIA: 442 MS
R AXIS: -3 DEGREES
T AXIS: 51 DEGREES
T OFFSET: 419 MS
VENTRICULAR RATE: 70 BPM

## 2025-07-03 PROCEDURE — 70450 CT HEAD/BRAIN W/O DYE: CPT

## 2025-07-03 PROCEDURE — 70450 CT HEAD/BRAIN W/O DYE: CPT | Performed by: STUDENT IN AN ORGANIZED HEALTH CARE EDUCATION/TRAINING PROGRAM

## 2025-07-03 ASSESSMENT — PAIN DESCRIPTION - PAIN TYPE: TYPE: ACUTE PAIN

## 2025-07-03 ASSESSMENT — PAIN - FUNCTIONAL ASSESSMENT: PAIN_FUNCTIONAL_ASSESSMENT: 0-10

## 2025-07-03 ASSESSMENT — PAIN DESCRIPTION - PROGRESSION: CLINICAL_PROGRESSION: NOT CHANGED

## 2025-07-03 ASSESSMENT — PAIN DESCRIPTION - LOCATION: LOCATION: HEAD

## 2025-07-03 ASSESSMENT — PAIN SCALES - GENERAL: PAINLEVEL_OUTOF10: 2

## 2025-07-03 NOTE — ED PROVIDER NOTES
History of Present Illness     History provided by: Patient  Limitations to History: None  External Records Reviewed with Brief Summary: Outpatient progress note from 6/3/2025 primary care which showed patient has history of hypertension hyperlipidemia CAD, migraine headaches, asthma, GERD.  Patient's highest blood pressures noted to be between 120 and 130 systolics.    HPI:  Juju Adams is a 64 y.o. female with a pmhx of HTN, HLD, CAD, arthritis, presenting to the ED for evaluation of elevated blood pressure reading taht started yesterday evening and persisted throughout the day today. She endorses intermittent headaches and neck pain over the past two days that have been worse in the evenings. She also endorses that she developed a burst blood vessel in her eye yesterday. Patient works in cardiology department here and had staff measure her blood pressure throughout the day today. She states that it remained elevated from her usual 120-130 systolic but was still less than 160 throughout the day. Patient endorses she takes carvedilol bid and chlorthalidone in the mornings as well. Patient denies taking her evening dose of carvedilol prior to arrival. No other missed medication doses. Patient is taking a baby aspirin daily given her history of CAD.     Physical Exam   Triage vitals:  T 36.5 °C (97.7 °F)  HR 70  BP (!) 177/117  RR 16  O2 96 % None (Room air)    General: Awake, alert, in no acute distress  Eyes: Gaze conjugate.  Subconjunctival hemorrhage to the left medial eye.  HENT: Normo-cephalic, atraumatic. No stridor  CV: Regular rate, regular rhythm. Radial pulses 2+ bilaterally  Resp: Breathing non-labored, speaking in full sentences.  Clear to auscultation bilaterally  GI: Soft, non-distended, non-tender. No rebound or guarding.  MSK/Extremities: No gross bony deformities. Moving all extremities. Edema to the bilateral lower extremities which is equal bilaterally and is nonpitting. No overlying  erythema or tenderness to palpation.   Skin: Warm. Appropriate color  Neuro: Alert. Oriented. Face symmetric. Speech is fluent.  Gross strength and sensation intact in b/l UE and Les. Patient ambulates without ataxia. Finger to nose within normal limits. Heel to shin within normal limits. Strength and senation intact to the bilateral lower extremities. Cranial nerves II-XII intact.   Psych: Appropriate mood and affect  And  Medical Decision Making & ED Course   Medical Decision Makin y.o. female presenting to the ED for evaluation of headaches and HTN which onset two days ago. Patient has no focal neurologic deficits on my exam but will plan to obtain CT head for screening purposes. Plan for supportive care given subconjunctival hemorrhage. Will trial fluids and Tylenol for alleviation of headache in addition to patient's home evening coreg dose. Labs obtained as part of the triage process were reviewed. No leukocytosis or anemia. Sodium low at 130, NS to be given for headache treatment which should also assist with sodium repletion in the setting of this abnormality. Troponin negative.     Patient wishes to be discharged home following her workup. She would like to return to work for tomorrow.     CT head reveals no acute intracranial process.  Patient informed of these results and she was comfortable with being discharged home.  Patient is requesting a work note for tomorrow, as she was here longer than she anticipated.  This was provided to the patient and she was provided with return precautions as well.  Discharged home in stable condition.    ----    Differential diagnoses considered include but are not limited to: see MDM.      Social Determinants of Health which Significantly Impact Care: None identified     EKG Independent Interpretation: EKG obtained at 1917 was independently interpreted and reviewed by myself which reveals normal sinus rhythm with ventricular rate of 70 bpm.  Normal axis.  Intervals  within normal.  No acute ST elevations or depressions concerning for ischemia.  No pathologic T wave inversions noted.  Similar when compared to previous EKG from 1/22/2025.    Independent Result Review and Interpretation: Relevant laboratory and radiographic results were reviewed and independently interpreted by myself.  As necessary, they are commented on in the ED Course.    Chronic conditions affecting the patient's care: As documented above in [facility]    The patient was discussed with the following consultants/services: None    Care Considerations: As documented above in [facility]    ED Course:  ED Course as of 07/02/25 2232 Wed Jul 02, 2025 2223 EGFR: >90 [SM]      ED Course User Index  [SM] Kartik Mayes MD         Diagnoses as of 07/02/25 2232   Hypertension, unspecified type   Nonintractable headache, unspecified chronicity pattern, unspecified headache type   Subconjunctival hemorrhage of left eye     Disposition   As a result of the work-up, the patient was discharged home.  she was informed of her diagnosis and instructed to come back with any concerns or worsening of condition.  she and was agreeable to the plan as discussed above.  she was given the opportunity to ask questions.  All of the patient's questions were answered.    Procedures   Procedures    Patient seen and discussed with ED attending physician.    Vanessa Aleman DO  Emergency Medicine     Vanessa Aleman DO  Resident  07/03/25 [id number]

## 2025-07-03 NOTE — DISCHARGE INSTRUCTIONS
You were seen in the emergency department for evaluation of intermittent headaches and elevated blood pressure.  Your blood pressure today was in the 150s over 80s when you are seen back in her room.  You were given Tylenol, IV fluids, some food, and your home blood pressure medications. We obtained a CT Head which reveals no acute findings. Continue to take your home medications and follow up with your cardiologist regarding any need for changes to your medications. Return to the ED if you develop difficulty walking, speaking, or using your arms or legs, or for any other concerns.     Your eye exam revealed a subconjunctival hemorrhage. How is subconjunctival hemorrhage treated? -- In most cases, specific treatment is not needed. A subconjunctival hemorrhage can look scary. But the blood will usually absorb back into your eye within 1 to 2 weeks. The area might change colors as it heals. For example, it might turn yellow.

## 2025-07-11 ENCOUNTER — PHARMACY VISIT (OUTPATIENT)
Dept: PHARMACY | Facility: CLINIC | Age: 65
End: 2025-07-11
Payer: COMMERCIAL

## 2025-07-11 PROCEDURE — RXMED WILLOW AMBULATORY MEDICATION CHARGE

## 2025-07-20 ENCOUNTER — APPOINTMENT (OUTPATIENT)
Dept: RADIOLOGY | Facility: HOSPITAL | Age: 65
End: 2025-07-20
Payer: COMMERCIAL

## 2025-07-20 ENCOUNTER — HOSPITAL ENCOUNTER (EMERGENCY)
Facility: HOSPITAL | Age: 65
Discharge: HOME | End: 2025-07-21
Payer: COMMERCIAL

## 2025-07-20 VITALS
HEART RATE: 79 BPM | BODY MASS INDEX: 32.78 KG/M2 | TEMPERATURE: 98.1 F | OXYGEN SATURATION: 97 % | WEIGHT: 185 LBS | SYSTOLIC BLOOD PRESSURE: 146 MMHG | DIASTOLIC BLOOD PRESSURE: 75 MMHG | HEIGHT: 63 IN | RESPIRATION RATE: 20 BRPM

## 2025-07-20 DIAGNOSIS — K20.90 ESOPHAGITIS: Primary | ICD-10-CM

## 2025-07-20 LAB
ALBUMIN SERPL BCP-MCNC: 4.1 G/DL (ref 3.4–5)
ALP SERPL-CCNC: 85 U/L (ref 33–136)
ALT SERPL W P-5'-P-CCNC: 12 U/L (ref 7–45)
ANION GAP SERPL CALCULATED.3IONS-SCNC: 9 MMOL/L (ref 10–20)
APPEARANCE UR: CLEAR
AST SERPL W P-5'-P-CCNC: 13 U/L (ref 9–39)
BACTERIA #/AREA URNS AUTO: ABNORMAL /HPF
BASOPHILS # BLD AUTO: 0.03 X10*3/UL (ref 0–0.1)
BASOPHILS NFR BLD AUTO: 0.5 %
BILIRUB SERPL-MCNC: 0.7 MG/DL (ref 0–1.2)
BILIRUB UR STRIP.AUTO-MCNC: NEGATIVE MG/DL
BUN SERPL-MCNC: 14 MG/DL (ref 6–23)
CALCIUM SERPL-MCNC: 9.6 MG/DL (ref 8.6–10.3)
CARDIAC TROPONIN I PNL SERPL HS: <3 NG/L (ref 0–13)
CARDIAC TROPONIN I PNL SERPL HS: <3 NG/L (ref 0–13)
CHLORIDE SERPL-SCNC: 97 MMOL/L (ref 98–107)
CO2 SERPL-SCNC: 32 MMOL/L (ref 21–32)
COLOR UR: COLORLESS
CREAT SERPL-MCNC: 0.73 MG/DL (ref 0.5–1.05)
EGFRCR SERPLBLD CKD-EPI 2021: >90 ML/MIN/1.73M*2
EOSINOPHIL # BLD AUTO: 0.02 X10*3/UL (ref 0–0.7)
EOSINOPHIL NFR BLD AUTO: 0.4 %
ERYTHROCYTE [DISTWIDTH] IN BLOOD BY AUTOMATED COUNT: 12.8 % (ref 11.5–14.5)
GLUCOSE SERPL-MCNC: 100 MG/DL (ref 74–99)
GLUCOSE UR STRIP.AUTO-MCNC: NORMAL MG/DL
HCT VFR BLD AUTO: 41.3 % (ref 36–46)
HGB BLD-MCNC: 13.8 G/DL (ref 12–16)
IMM GRANULOCYTES # BLD AUTO: 0.01 X10*3/UL (ref 0–0.7)
IMM GRANULOCYTES NFR BLD AUTO: 0.2 % (ref 0–0.9)
KETONES UR STRIP.AUTO-MCNC: NEGATIVE MG/DL
LACTATE SERPL-SCNC: 0.6 MMOL/L (ref 0.4–2)
LEUKOCYTE ESTERASE UR QL STRIP.AUTO: ABNORMAL
LIPASE SERPL-CCNC: 23 U/L (ref 9–82)
LYMPHOCYTES # BLD AUTO: 2.14 X10*3/UL (ref 1.2–4.8)
LYMPHOCYTES NFR BLD AUTO: 38.7 %
MCH RBC QN AUTO: 31.3 PG (ref 26–34)
MCHC RBC AUTO-ENTMCNC: 33.4 G/DL (ref 32–36)
MCV RBC AUTO: 94 FL (ref 80–100)
MONOCYTES # BLD AUTO: 0.47 X10*3/UL (ref 0.1–1)
MONOCYTES NFR BLD AUTO: 8.5 %
NEUTROPHILS # BLD AUTO: 2.86 X10*3/UL (ref 1.2–7.7)
NEUTROPHILS NFR BLD AUTO: 51.7 %
NITRITE UR QL STRIP.AUTO: NEGATIVE
NRBC BLD-RTO: 0 /100 WBCS (ref 0–0)
PH UR STRIP.AUTO: 7 [PH]
PLATELET # BLD AUTO: 227 X10*3/UL (ref 150–450)
POTASSIUM SERPL-SCNC: 3.6 MMOL/L (ref 3.5–5.3)
PROT SERPL-MCNC: 7.2 G/DL (ref 6.4–8.2)
PROT UR STRIP.AUTO-MCNC: NEGATIVE MG/DL
RBC # BLD AUTO: 4.41 X10*6/UL (ref 4–5.2)
RBC # UR STRIP.AUTO: NEGATIVE MG/DL
RBC #/AREA URNS AUTO: ABNORMAL /HPF
SODIUM SERPL-SCNC: 134 MMOL/L (ref 136–145)
SP GR UR STRIP.AUTO: 1.01
SQUAMOUS #/AREA URNS AUTO: ABNORMAL /HPF
UROBILINOGEN UR STRIP.AUTO-MCNC: NORMAL MG/DL
WBC # BLD AUTO: 5.5 X10*3/UL (ref 4.4–11.3)
WBC #/AREA URNS AUTO: ABNORMAL /HPF

## 2025-07-20 PROCEDURE — 96361 HYDRATE IV INFUSION ADD-ON: CPT

## 2025-07-20 PROCEDURE — 73030 X-RAY EXAM OF SHOULDER: CPT | Mod: RT

## 2025-07-20 PROCEDURE — 85025 COMPLETE CBC W/AUTO DIFF WBC: CPT

## 2025-07-20 PROCEDURE — 83690 ASSAY OF LIPASE: CPT

## 2025-07-20 PROCEDURE — 84484 ASSAY OF TROPONIN QUANT: CPT

## 2025-07-20 PROCEDURE — 2500000004 HC RX 250 GENERAL PHARMACY W/ HCPCS (ALT 636 FOR OP/ED)

## 2025-07-20 PROCEDURE — 83605 ASSAY OF LACTIC ACID: CPT

## 2025-07-20 PROCEDURE — 99285 EMERGENCY DEPT VISIT HI MDM: CPT | Mod: 25

## 2025-07-20 PROCEDURE — 96375 TX/PRO/DX INJ NEW DRUG ADDON: CPT

## 2025-07-20 PROCEDURE — 36415 COLL VENOUS BLD VENIPUNCTURE: CPT

## 2025-07-20 PROCEDURE — 81003 URINALYSIS AUTO W/O SCOPE: CPT

## 2025-07-20 PROCEDURE — 2550000001 HC RX 255 CONTRASTS

## 2025-07-20 PROCEDURE — 73030 X-RAY EXAM OF SHOULDER: CPT | Mod: RIGHT SIDE | Performed by: SURGERY

## 2025-07-20 PROCEDURE — 96374 THER/PROPH/DIAG INJ IV PUSH: CPT | Mod: 59

## 2025-07-20 PROCEDURE — 74177 CT ABD & PELVIS W/CONTRAST: CPT | Performed by: STUDENT IN AN ORGANIZED HEALTH CARE EDUCATION/TRAINING PROGRAM

## 2025-07-20 PROCEDURE — 80053 COMPREHEN METABOLIC PANEL: CPT

## 2025-07-20 PROCEDURE — 87086 URINE CULTURE/COLONY COUNT: CPT | Mod: WESLAB

## 2025-07-20 PROCEDURE — 74177 CT ABD & PELVIS W/CONTRAST: CPT

## 2025-07-20 RX ORDER — FAMOTIDINE 40 MG/1
40 TABLET, FILM COATED ORAL NIGHTLY PRN
Qty: 30 TABLET | Refills: 0 | Status: SHIPPED | OUTPATIENT
Start: 2025-07-20 | End: 2025-08-19

## 2025-07-20 RX ORDER — MORPHINE SULFATE 4 MG/ML
4 INJECTION, SOLUTION INTRAMUSCULAR; INTRAVENOUS ONCE
Status: COMPLETED | OUTPATIENT
Start: 2025-07-20 | End: 2025-07-20

## 2025-07-20 RX ORDER — FAMOTIDINE 10 MG/ML
40 INJECTION, SOLUTION INTRAVENOUS ONCE
Status: DISCONTINUED | OUTPATIENT
Start: 2025-07-20 | End: 2025-07-21

## 2025-07-20 RX ORDER — ONDANSETRON HYDROCHLORIDE 2 MG/ML
4 INJECTION, SOLUTION INTRAVENOUS ONCE
Status: COMPLETED | OUTPATIENT
Start: 2025-07-20 | End: 2025-07-20

## 2025-07-20 RX ORDER — PROCHLORPERAZINE EDISYLATE 5 MG/ML
10 INJECTION INTRAMUSCULAR; INTRAVENOUS ONCE
Status: DISCONTINUED | OUTPATIENT
Start: 2025-07-20 | End: 2025-07-21

## 2025-07-20 RX ORDER — ONDANSETRON 4 MG/1
4 TABLET, FILM COATED ORAL EVERY 6 HOURS
Qty: 12 TABLET | Refills: 0 | Status: SHIPPED | OUTPATIENT
Start: 2025-07-20 | End: 2025-07-23

## 2025-07-20 RX ORDER — PROCHLORPERAZINE MALEATE 10 MG
10 TABLET ORAL 2 TIMES DAILY PRN
Qty: 10 TABLET | Refills: 0 | Status: SHIPPED | OUTPATIENT
Start: 2025-07-20 | End: 2025-07-25

## 2025-07-20 RX ADMIN — MORPHINE SULFATE 4 MG: 4 INJECTION, SOLUTION INTRAMUSCULAR; INTRAVENOUS at 21:31

## 2025-07-20 RX ADMIN — SODIUM CHLORIDE 1000 ML: 900 INJECTION, SOLUTION INTRAVENOUS at 21:25

## 2025-07-20 RX ADMIN — ONDANSETRON 4 MG: 2 INJECTION, SOLUTION INTRAMUSCULAR; INTRAVENOUS at 21:29

## 2025-07-20 RX ADMIN — IOHEXOL 75 ML: 350 INJECTION, SOLUTION INTRAVENOUS at 21:37

## 2025-07-20 ASSESSMENT — LIFESTYLE VARIABLES
HAVE PEOPLE ANNOYED YOU BY CRITICIZING YOUR DRINKING: NO
TOTAL SCORE: 0
EVER FELT BAD OR GUILTY ABOUT YOUR DRINKING: NO
EVER HAD A DRINK FIRST THING IN THE MORNING TO STEADY YOUR NERVES TO GET RID OF A HANGOVER: NO
HAVE YOU EVER FELT YOU SHOULD CUT DOWN ON YOUR DRINKING: NO

## 2025-07-20 ASSESSMENT — PAIN - FUNCTIONAL ASSESSMENT: PAIN_FUNCTIONAL_ASSESSMENT: 0-10

## 2025-07-20 ASSESSMENT — PAIN DESCRIPTION - LOCATION: LOCATION: ARM

## 2025-07-20 ASSESSMENT — PAIN SCALES - GENERAL: PAINLEVEL_OUTOF10: 4

## 2025-07-21 PROCEDURE — 2500000001 HC RX 250 WO HCPCS SELF ADMINISTERED DRUGS (ALT 637 FOR MEDICARE OP)

## 2025-07-21 RX ORDER — PROCHLORPERAZINE MALEATE 10 MG
10 TABLET ORAL ONCE
Status: COMPLETED | OUTPATIENT
Start: 2025-07-21 | End: 2025-07-21

## 2025-07-21 RX ORDER — FAMOTIDINE 20 MG/1
40 TABLET, FILM COATED ORAL ONCE
Status: COMPLETED | OUTPATIENT
Start: 2025-07-21 | End: 2025-07-21

## 2025-07-21 RX ADMIN — PROCHLORPERAZINE MALEATE 10 MG: 10 TABLET ORAL at 00:18

## 2025-07-21 RX ADMIN — FAMOTIDINE 20 MG: 20 TABLET, FILM COATED ORAL at 00:17

## 2025-07-21 NOTE — ED PROVIDER NOTES
HPI   Chief Complaint   Patient presents with    MULTIPLE COMPLAINTS       HPI  Patient is a 64-year-old female presents ED for multiple complaints.  Patient reports epigastric pain, nausea and vomiting.  She also reports right shoulder pain.  Her epigastric pain is not made better or worse, does not radiate, is worse with eating.  Patient does report a history of GERD.  Does not take a PPI.  Denies any trauma to the right shoulder.  Denies any recent fever or chills, cough or congestion, chest pain or shortness of breath, diarrhea, urinary symptoms.  No recent hospitalizations or surgeries.  No recent travel or sick contacts.  Patient does not smoke.      Patient History   Medical History[1]  Surgical History[2]  Family History[3]  Social History[4]    Physical Exam   ED Triage Vitals [07/20/25 2019]   Temperature Heart Rate Respirations BP   36.7 °C (98.1 °F) 79 20 146/75      Pulse Ox Temp Source Heart Rate Source Patient Position   97 % Temporal -- --      BP Location FiO2 (%)     -- --       Physical Exam  Vitals reviewed.   Constitutional:       General: She is not in acute distress.     Appearance: Normal appearance. She is not ill-appearing.   HENT:      Head: Normocephalic and atraumatic.     Eyes:      Extraocular Movements: Extraocular movements intact.       Cardiovascular:      Rate and Rhythm: Normal rate and regular rhythm.      Heart sounds: Normal heart sounds.   Pulmonary:      Effort: Pulmonary effort is normal.      Breath sounds: Normal breath sounds.   Abdominal:      General: Abdomen is flat.      Palpations: Abdomen is soft.      Tenderness: There is abdominal tenderness.     Musculoskeletal:         General: Normal range of motion.      Cervical back: Normal range of motion and neck supple.     Skin:     General: Skin is warm and dry.     Neurological:      Mental Status: She is alert and oriented to person, place, and time.     Psychiatric:         Mood and Affect: Mood normal.          Behavior: Behavior normal.           ED Course & MDM   Diagnoses as of 07/21/25 0033   Esophagitis                 No data recorded     [place] Coma Scale Score: 15 (07/20/25 2022 : Yoon Gifford RN)                           Medical Decision Making  Parts of this chart have been completed using voice recognition software. Please excuse any errors of transcription.  My thought process and reason for plan has been formulated from the time that I saw the patient until the time of disposition and is not specific to one specific moment during their visit and furthermore my MDM encompasses this entire chart and not only this text box.    HPI:   A medically appropriate HPI was obtained, outlined above.    Juju Adams is a  64 y.o. female    Chief Complaint   Patient presents with    MULTIPLE COMPLAINTS       Medical History[5]    Surgical History[6]    Social History[7]    Family History[8]    Allergies[9]    Current Outpatient Medications   Medication Instructions    sdccn-tcnw-CiQUB-collag-mv-min (Kody, with collagen,) 7-7-1.5 gram powder in packet 2 Doses, Daily    ascorbic acid (VITAMIN C) 500 mg, oral, Daily    aspirin 81 mg, oral, Daily    atorvastatin (LIPITOR) 80 mg, oral, Nightly    carvedilol (Coreg) 25 mg tablet Take 1 tablet (25 mg) by mouth 2 times a day with meals.    chlorthalidone (HYGROTON) 12.5 mg, oral, Daily    cholecalciferol (VITAMIN D-3) 25 mcg, oral, Daily    electrolytes, oral solution 1 Dose, oral, Daily PRN, Liquid IV    famotidine (PEPCID) 20 mg, oral, 2 times daily    famotidine (PEPCID) 40 mg, oral, Nightly PRN    fexofenadine-pseudoephedrine (Allegra-D 24) 180-240 mg 24 hr tablet 1 tablet, oral, Daily PRN, Do not crush, chew, or split.    magnesium oxide 500 mg, oral, Daily    omeprazole (PRILOSEC) 40 mg, oral, Daily, Do not crush or chew.    ondansetron (ZOFRAN) 4 mg, oral, Every 6 hours    potassium gluconate 595 mg (99 mg) tablet 1 tablet, oral, Daily    prochlorperazine  "(COMPAZINE) 10 mg, oral, 2 times daily PRN    sucralfate (CARAFATE) 1 g, oral, 4 times daily before meals and nightly    SUMAtriptan (IMITREX) 25 mg, oral, Daily PRN, May repeat dose once in 2 hours if no relief.  Do not exceed 2 doses in 24 hours.    topiramate (TOPAMAX) 100 mg, Daily    zinc gluconate 50 mg tablet 50 mg of elemental zinc, Daily   for details    Exam:   Patient Vitals for the past 24 hrs:   BP Temp Temp src Pulse Resp SpO2 Height Weight   07/20/25 2019 146/75 36.7 °C (98.1 °F) Temporal 79 20 97 % 1.6 m (5' 3\") 83.9 kg (185 lb)       A medically appropriate exam performed, outlined above, given the known history and presentation.    EKG/Cardiac monitor:   If EKG was done and, it was interpreted by attending physician, see their note for ED course for more detail.    Medications given during visit:  Medications   ondansetron (Zofran) injection 4 mg (4 mg intravenous Given 7/20/25 2129)   morphine injection 4 mg (4 mg intravenous Given 7/20/25 2131)   sodium chloride 0.9 % bolus 1,000 mL (0 mL intravenous Stopped 7/21/25 0014)   iohexol (OMNIPaque) 350 mg iodine/mL solution 75 mL (75 mL intravenous Given 7/20/25 2137)   prochlorperazine (Compazine) tablet 10 mg (10 mg oral Given 7/21/25 0018)   famotidine (Pepcid) tablet 40 mg (20 mg oral Given 7/21/25 0017)        Diagnostic/tests:  Labs Reviewed   COMPREHENSIVE METABOLIC PANEL - Abnormal       Result Value    Glucose 100 (*)     Sodium 134 (*)     Potassium 3.6      Chloride 97 (*)     Bicarbonate 32      Anion Gap 9 (*)     Urea Nitrogen 14      Creatinine 0.73      eGFR >90      Calcium 9.6      Albumin 4.1      Alkaline Phosphatase 85      Total Protein 7.2      AST 13      Bilirubin, Total 0.7      ALT 12     URINALYSIS WITH REFLEX CULTURE AND MICROSCOPIC - Abnormal    Color, Urine Colorless (*)     Appearance, Urine Clear      Specific Gravity, Urine 1.010      pH, Urine 7.0      Protein, Urine NEGATIVE      Glucose, Urine Normal      Blood, " Urine NEGATIVE      Ketones, Urine NEGATIVE      Bilirubin, Urine NEGATIVE      Urobilinogen, Urine Normal      Nitrite, Urine NEGATIVE      Leukocyte Esterase, Urine 75 Lisa/uL (*)    MICROSCOPIC ONLY, URINE - Abnormal    WBC, Urine 1-5      RBC, Urine 1-2      Squamous Epithelial Cells, Urine 1-9 (SPARSE)      Bacteria, Urine 3+ (*)    LIPASE - Normal    Lipase 23      Narrative:     Venipuncture immediately after or during the administration of Metamizole may lead to falsely low results. Testing should be performed immediately prior to Metamizole dosing.   LACTATE - Normal    Lactate 0.6      Narrative:     Venipuncture immediately after or during the administration of Metamizole may lead to falsely low results. Testing should be performed immediately prior to Metamizole dosing.   SERIAL TROPONIN-INITIAL - Normal    Troponin I, High Sensitivity <3      Narrative:     Less than 99th percentile of normal range cutoff-  Female and children under 18 years old <14 ng/L; Male <21 ng/L: Negative  Repeat testing should be performed if clinically indicated.     Female and children under 18 years old 14-50 ng/L; Male 21-50 ng/L:  Consistent with possible cardiac damage and possible increased clinical   risk. Serial measurements may help to assess extent of myocardial damage.     >50 ng/L: Consistent with cardiac damage, increased clinical risk and  myocardial infarction. Serial measurements may help assess extent of   myocardial damage.      NOTE: Children less than 1 year old may have higher baseline troponin   levels and results should be interpreted in conjunction with the overall   clinical context.     NOTE: Troponin I testing is performed using a different   testing methodology at [facility] than at other   [facility]. Direct result comparisons should only   be made within the same method.   SERIAL TROPONIN, 1 HOUR - Normal    Troponin I, High Sensitivity <3      Narrative:     Less than 99th  percentile of normal range cutoff-  Female and children under 18 years old <14 ng/L; Male <21 ng/L: Negative  Repeat testing should be performed if clinically indicated.     Female and children under 18 years old 14-50 ng/L; Male 21-50 ng/L:  Consistent with possible cardiac damage and possible increased clinical   risk. Serial measurements may help to assess extent of myocardial damage.     >50 ng/L: Consistent with cardiac damage, increased clinical risk and  myocardial infarction. Serial measurements may help assess extent of   myocardial damage.      NOTE: Children less than 1 year old may have higher baseline troponin   levels and results should be interpreted in conjunction with the overall   clinical context.     NOTE: Troponin I testing is performed using a different   testing methodology at [facility] than at other   [facility]. Direct result comparisons should only   be made within the same method.   URINE CULTURE   CBC WITH AUTO DIFFERENTIAL    WBC 5.5      nRBC 0.0      RBC 4.41      Hemoglobin 13.8      Hematocrit 41.3      MCV 94      MCH 31.3      MCHC 33.4      RDW 12.8      Platelets 227      Neutrophils % 51.7      Immature Granulocytes %, Automated 0.2      Lymphocytes % 38.7      Monocytes % 8.5      Eosinophils % 0.4      Basophils % 0.5      Neutrophils Absolute 2.86      Immature Granulocytes Absolute, Automated 0.01      Lymphocytes Absolute 2.14      Monocytes Absolute 0.47      Eosinophils Absolute 0.02      Basophils Absolute 0.03     TROPONIN SERIES- (INITIAL, 1 HR)    Narrative:     The following orders were created for panel order Troponin I Series, High Sensitivity (0, 1 HR).  Procedure                               Abnormality         Status                     ---------                               -----------         ------                     Troponin I, High Sensiti...[[id number]]  Normal              Final result               Troponin, High  Sensitivi...[[id number]]  Normal              Final result                 Please view results for these tests on the individual orders.   URINALYSIS WITH REFLEX CULTURE AND MICROSCOPIC    Narrative:     The following orders were created for panel order Urinalysis with Reflex Culture and Microscopic.  Procedure                               Abnormality         Status                     ---------                               -----------         ------                     Urinalysis with Reflex C...[[id number]]  Abnormal            Final result               Extra Urine Gray Tube[[id number]]                            In process                   Please view results for these tests on the individual orders.   EXTRA URINE GRAY TUBE        CT abdomen pelvis w IV contrast   Final Result   1. Mild wall thickening of the distal esophagus, which may represent   some reflux esophagitis.   2. Otherwise, no acute abnormality is identified within the abdomen   or pelvis.        MACRO:   None.        Signed by: Hugo Gonzalez 7/20/2025 10:43 PM   Dictation workstation:   [id number]      XR shoulder right 2+ views   Final Result   No evidence of acute osseous abnormality. Postsurgical and   degenerative changes.             MACRO:   None        Signed by: Seth Mandel 7/20/2025 9:48 PM   Dictation workstation:   [id number]             [facility] Summary:  CT abdomen shows some evidence of esophagitis.  Overall impression is gastritis and esophagitis.  No significant lab abnormality.  Results were discussed with the patient.  Will prescribe antiemetic and Pepcid.  Aftercare instructions and return precautions were discussed with the patient.    We have discussed the diagnosis and risks, and we agree with discharging home to follow-up with appropriate physician as directed. We also discussed returning to the Emergency Department immediately if new or worsening symptoms occur. We have discussed the symptoms which are most concerning  that necessitate immediate return. Pt symptoms have been well controlled here and the patient is safe for discharge with appropriate outpatient follow up. The patient has verbalized understanding to return to ER without delay for new or worsening pains or for any other symptoms or concerns. I utilized the discharge clinical management tool provided [employer] to help estimate risk of negative outcome for this patient.        Disposition:  ED Prescriptions       Medication Sig Dispense Start Date End Date Auth. Provider    famotidine (Pepcid) 40 mg tablet Take 1 tablet (40 mg) by mouth as needed at bedtime for heartburn. 30 tablet 7/20/2025 8/19/2025 Mauro Bui PA-C    ondansetron (Zofran) 4 mg tablet Take 1 tablet (4 mg) by mouth every 6 hours for 3 days. 12 tablet 7/20/2025 7/23/2025 Mauro Bui PA-C    prochlorperazine (Compazine) 10 mg tablet Take 1 tablet (10 mg) by mouth 2 times a day as needed for nausea or vomiting for up to 5 days. 10 tablet 7/20/2025 7/25/2025 Mauro Bui PA-C            All of the patient's questions were answered to the best of my ability. Patient states understanding that they have been screened for an emergency today and we have not found any etiology of symptoms that requires emergent treatment or admission to the hospital at this point. They understand that they have not had definitive care day and require follow-up for treatment of their condition. They also state understanding that they may have an emergent condition that may potentially have not of detected at this visit and they must return to the emergency department if they develop any worsening of symptoms or new complaints.       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    Coronary artery disease     GERD (gastroesophageal reflux disease)     Hyperlipidemia     Hypertension      Migraines     Other specified cough 2019    Post-viral cough syndrome    Personal history of other diseases of the respiratory system 2017    History of acute bronchitis with bronchospasm    Personal history of other diseases of the respiratory system 2018    History of acute sinusitis    Personal history of other drug therapy 2016    History of influenza vaccination    Personal history of other infectious and parasitic diseases 2015    History of herpes zoster    Vision loss    [2]   Past Surgical History:  Procedure Laterality Date    CARDIAC CATHETERIZATION      COLONOSCOPY      TOTAL HIP ARTHROPLASTY Bilateral 2014    Total Hip Replacement    TOTAL KNEE ARTHROPLASTY Left     TUBAL LIGATION  2018    Tubal Ligation    UPPER GASTROINTESTINAL ENDOSCOPY     [3]   Family History  Problem Relation Name Age of Onset    Hypertension Mother      Heart attack Mother      Hypertension Father      Heart attack Father      Stroke Father      Diabetes Sister      Hypertension Sister      Seizures Sister     [4]   Social History  Tobacco Use    Smoking status: Former     Current packs/day: 0.00     Types: Cigarettes     Quit date:      Years since quittin.5     Passive exposure: Past    Smokeless tobacco: Never   Vaping Use    Vaping status: Never Used   Substance Use Topics    Alcohol use: Never    Drug use: Not Currently   [5]   Past Medical History:  Diagnosis Date    Arthritis     Asthma     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    Coronary artery disease     GERD (gastroesophageal reflux disease)     Hyperlipidemia     Hypertension     Migraines     Other specified cough 2019    Post-viral cough syndrome    Personal history of other diseases of the respiratory system 2017    History of acute bronchitis with bronchospasm    Personal history of other diseases of the respiratory system 2018    History of acute sinusitis     Personal history of other drug therapy 2016    History of influenza vaccination    Personal history of other infectious and parasitic diseases 2015    History of herpes zoster    Vision loss    [6]   Past Surgical History:  Procedure Laterality Date    CARDIAC CATHETERIZATION      COLONOSCOPY      TOTAL HIP ARTHROPLASTY Bilateral 2014    Total Hip Replacement    TOTAL KNEE ARTHROPLASTY Left     TUBAL LIGATION  2018    Tubal Ligation    UPPER GASTROINTESTINAL ENDOSCOPY     [7]   Social History  Tobacco Use    Smoking status: Former     Current packs/day: 0.00     Types: Cigarettes     Quit date:      Years since quittin.5     Passive exposure: Past    Smokeless tobacco: Never   Vaping Use    Vaping status: Never Used   Substance Use Topics    Alcohol use: Never    Drug use: Not Currently   [8]   Family History  Problem Relation Name Age of Onset    Hypertension Mother      Heart attack Mother      Hypertension Father      Heart attack Father      Stroke Father      Diabetes Sister      Hypertension Sister      Seizures Sister     [9]   Allergies  Allergen Reactions    Sulfa (Sulfonamide Antibiotics) Hives    Latex Rash    Triethanolamine Oleate Rash        Mauro Bui PA-C  25 0036

## 2025-07-21 NOTE — ED TRIAGE NOTES
Pt states she thinks she has food poisoning bc an after after she ate she threw up 2x around 1500 and prior to arrival. Pt also concerned that she is having pain in her right arm and shoulder after getting her nails done a week ago.

## 2025-07-22 LAB — BACTERIA UR CULT: NORMAL

## 2025-07-27 ENCOUNTER — HOSPITAL ENCOUNTER (EMERGENCY)
Facility: HOSPITAL | Age: 65
Discharge: HOME | End: 2025-07-27
Payer: COMMERCIAL

## 2025-07-27 VITALS
DIASTOLIC BLOOD PRESSURE: 65 MMHG | TEMPERATURE: 97.7 F | BODY MASS INDEX: 32.78 KG/M2 | RESPIRATION RATE: 16 BRPM | HEIGHT: 63 IN | OXYGEN SATURATION: 99 % | WEIGHT: 185 LBS | SYSTOLIC BLOOD PRESSURE: 142 MMHG | HEART RATE: 74 BPM

## 2025-07-27 DIAGNOSIS — L03.019 FELON OF FINGER: ICD-10-CM

## 2025-07-27 DIAGNOSIS — L03.011 PARONYCHIA OF FINGER OF RIGHT HAND: Primary | ICD-10-CM

## 2025-07-27 PROCEDURE — 99283 EMERGENCY DEPT VISIT LOW MDM: CPT

## 2025-07-27 RX ORDER — IBUPROFEN 800 MG/1
800 TABLET, FILM COATED ORAL 3 TIMES DAILY
Qty: 21 TABLET | Refills: 0 | Status: SHIPPED | OUTPATIENT
Start: 2025-07-27 | End: 2025-08-03

## 2025-07-27 RX ORDER — IBUPROFEN 800 MG/1
800 TABLET, FILM COATED ORAL 3 TIMES DAILY
Qty: 21 TABLET | Refills: 0 | Status: SHIPPED | OUTPATIENT
Start: 2025-07-27 | End: 2025-07-27

## 2025-07-27 RX ORDER — DOXYCYCLINE 100 MG/1
100 CAPSULE ORAL 2 TIMES DAILY
Qty: 20 CAPSULE | Refills: 0 | Status: SHIPPED | OUTPATIENT
Start: 2025-07-27 | End: 2025-07-27

## 2025-07-27 RX ORDER — DOXYCYCLINE 100 MG/1
100 CAPSULE ORAL 2 TIMES DAILY
Qty: 20 CAPSULE | Refills: 0 | Status: SHIPPED | OUTPATIENT
Start: 2025-07-27 | End: 2025-08-06

## 2025-07-27 ASSESSMENT — PAIN DESCRIPTION - ORIENTATION: ORIENTATION: RIGHT

## 2025-07-27 ASSESSMENT — PAIN DESCRIPTION - PROGRESSION: CLINICAL_PROGRESSION: NOT CHANGED

## 2025-07-27 ASSESSMENT — PAIN DESCRIPTION - LOCATION: LOCATION: FINGER (COMMENT WHICH ONE)

## 2025-07-27 ASSESSMENT — PAIN DESCRIPTION - DESCRIPTORS: DESCRIPTORS: ACHING

## 2025-07-27 ASSESSMENT — PAIN DESCRIPTION - ONSET: ONSET: ONGOING

## 2025-07-27 ASSESSMENT — PAIN DESCRIPTION - PAIN TYPE: TYPE: ACUTE PAIN

## 2025-07-27 ASSESSMENT — PAIN DESCRIPTION - FREQUENCY: FREQUENCY: CONSTANT/CONTINUOUS

## 2025-07-27 ASSESSMENT — PAIN SCALES - GENERAL: PAINLEVEL_OUTOF10: 5 - MODERATE PAIN

## 2025-07-27 ASSESSMENT — PAIN - FUNCTIONAL ASSESSMENT: PAIN_FUNCTIONAL_ASSESSMENT: 0-10

## 2025-07-27 NOTE — ED TRIAGE NOTES
Pt thinks right middle finger might be infected after getting her nails done last week. States finger is painful. No swelling appreciated by this RN

## 2025-07-27 NOTE — ED PROVIDER NOTES
HPI   Chief Complaint   Patient presents with    Hand Pain       HPI  Patient is a 64-year-old who presents to ED for possible finger infection of the right middle finger.  Patient states symptoms started last week after getting her nails done and she states the finger is painful around the nailbed and pad of the finger.  She denies any discharge or swelling or redness.  Her only complaint is pain.  She has no other acute complaints.      Patient History   Medical History[1]  Surgical History[2]  Family History[3]  Social History[4]    Physical Exam   ED Triage Vitals [07/27/25 0017]   Temperature Heart Rate Respirations BP   36.5 °C (97.7 °F) 74 16 142/65      Pulse Ox Temp Source Heart Rate Source Patient Position   99 % Temporal -- Sitting      BP Location FiO2 (%)     Left arm --       Physical Exam  Vitals reviewed.   Constitutional:       General: She is not in acute distress.     Appearance: Normal appearance. She is not ill-appearing.   HENT:      Head: Normocephalic and atraumatic.   Eyes:      Extraocular Movements: Extraocular movements intact.   Cardiovascular:      Rate and Rhythm: Normal rate and regular rhythm.      Heart sounds: Normal heart sounds.   Pulmonary:      Effort: Pulmonary effort is normal.      Breath sounds: Normal breath sounds.   Abdominal:      Palpations: Abdomen is soft.      Tenderness: There is no abdominal tenderness.   Musculoskeletal:         General: Normal range of motion.      Cervical back: Normal range of motion and neck supple.   Skin:     General: Skin is warm and dry.   Neurological:      General: No focal deficit present.      Mental Status: She is alert and oriented to person, place, and time.   Psychiatric:         Mood and Affect: Mood normal.         Behavior: Behavior normal.    ED Course & MDM   Diagnoses as of 07/27/25 0120   Paronychia of finger of right hand   Felon of finger                 No data recorded     [place] Coma Scale Score: 15 (07/27/25 0021 :  Kristy Zurita RN)                           Medical Decision Making  Parts of this chart have been completed using voice recognition software. Please excuse any errors of transcription.  My thought process and reason for plan has been formulated from the time that I saw the patient until the time of disposition and is not specific to one specific moment during their visit and furthermore my MDM encompasses this entire chart and not only this text box.    HPI:   A medically appropriate HPI was obtained, outlined above.    Juju Adams is a  64 y.o. female    Chief Complaint   Patient presents with    Hand Pain       Medical History[5]    Surgical History[6]    Social History[7]    Family History[8]    Allergies[9]    Current Outpatient Medications   Medication Instructions    [id number]-yeys-UqBYX-collag-mv-min (Kody, with collagen,) 7-7-1.5 gram powder in packet 2 Doses, Daily    ascorbic acid (VITAMIN C) 500 mg, oral, Daily    aspirin 81 mg, oral, Daily    atorvastatin (LIPITOR) 80 mg, oral, Nightly    carvedilol (Coreg) 25 mg tablet Take 1 tablet (25 mg) by mouth 2 times a day with meals.    chlorthalidone (HYGROTON) 12.5 mg, oral, Daily    cholecalciferol (VITAMIN D-3) 25 mcg, oral, Daily    doxycycline (VIBRAMYCIN) 100 mg, oral, 2 times daily, Take with at least 8 ounces (large glass) of water, do not lie down for 30 minutes after    electrolytes, oral solution 1 Dose, oral, Daily PRN, Liquid IV    famotidine (PEPCID) 20 mg, oral, 2 times daily    famotidine (PEPCID) 40 mg, oral, Nightly PRN    fexofenadine-pseudoephedrine (Allegra-D 24) 180-240 mg 24 hr tablet 1 tablet, oral, Daily PRN, Do not crush, chew, or split.    ibuprofen 800 mg, oral, 3 times daily    magnesium oxide 500 mg, oral, Daily    omeprazole (PRILOSEC) 40 mg, oral, Daily, Do not crush or chew.    potassium gluconate 595 mg (99 mg) tablet 1 tablet, oral, Daily    sucralfate (CARAFATE) 1 g, oral, 4 times daily before meals and nightly     "SUMAtriptan (IMITREX) 25 mg, oral, Daily PRN, May repeat dose once in 2 hours if no relief.  Do not exceed 2 doses in 24 hours.    topiramate (TOPAMAX) 100 mg, Daily    zinc gluconate 50 mg tablet 50 mg of elemental zinc, Daily   for details    Exam:   Patient Vitals for the past 24 hrs:   BP Temp Temp src Pulse Resp SpO2 Height Weight   07/27/25 0017 142/65 36.5 °C (97.7 °F) Temporal 74 16 99 % 1.6 m (5' 3\") 83.9 kg (185 lb)       A medically appropriate exam performed, outlined above, given the known history and presentation.    EKG/Cardiac monitor:   If EKG was done and, it was interpreted by attending physician, see their note for ED course for more detail.    Medications given during visit:  Medications - No data to display     Diagnostic/tests:  Labs Reviewed - No data to display     No orders to display          MDM Summary:  Will treat for paronychia and felon with doxycycline.  Aftercare instructions and return precautions were discussed.    We have discussed the diagnosis and risks, and we agree with discharging home to follow-up with appropriate physician as directed. We also discussed returning to the Emergency Department immediately if new or worsening symptoms occur. We have discussed the symptoms which are most concerning that necessitate immediate return. Pt symptoms have been well controlled here and the patient is safe for discharge with appropriate outpatient follow up. The patient has verbalized understanding to return to ER without delay for new or worsening pains or for any other symptoms or concerns. I utilized the discharge clinical management tool provided [employer] to help estimate risk of negative outcome for this patient.        Disposition:  ED Prescriptions       Medication Sig Dispense Start Date End Date Auth. Provider    doxycycline (Vibramycin) 100 mg capsule  (Status: Discontinued) Take 1 capsule (100 mg) by mouth 2 times a day for 10 days. Take with at least 8 ounces " (large glass) of water, do not lie down for 30 minutes after 20 capsule 7/27/2025 7/27/2025 Mauro Bui PA-C    ibuprofen 800 mg tablet  (Status: Discontinued) Take 1 tablet (800 mg) by mouth 3 times a day for 7 days. 21 tablet 7/27/2025 7/27/2025 Mauro Bui PA-C    doxycycline (Vibramycin) 100 mg capsule Take 1 capsule (100 mg) by mouth 2 times a day for 10 days. Take with at least 8 ounces (large glass) of water, do not lie down for 30 minutes after 20 capsule 7/27/2025 8/6/2025 Mauro Bui PA-C    ibuprofen 800 mg tablet Take 1 tablet (800 mg) by mouth 3 times a day for 7 days. 21 tablet 7/27/2025 8/3/2025 Mauro Bui PA-C            All of the patient's questions were answered to the best of my ability. Patient states understanding that they have been screened for an emergency today and we have not found any etiology of symptoms that requires emergent treatment or admission to the hospital at this point. They understand that they have not had definitive care day and require follow-up for treatment of their condition. They also state understanding that they may have an emergent condition that may potentially have not of detected at this visit and they must return to the emergency department if they develop any worsening of symptoms or new complaints.       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    Coronary artery disease     GERD (gastroesophageal reflux disease)     Hyperlipidemia     Hypertension     Migraines     Other specified cough 02/18/2019    Post-viral cough syndrome    Personal history of other diseases of the respiratory system 12/26/2017    History of acute bronchitis with bronchospasm    Personal history of other diseases of the respiratory system 07/31/2018    History of acute sinusitis    Personal history of other drug therapy 11/01/2016     History of influenza vaccination    Personal history of other infectious and parasitic diseases 2015    History of herpes zoster    Vision loss    [2]   Past Surgical History:  Procedure Laterality Date    CARDIAC CATHETERIZATION      COLONOSCOPY      TOTAL HIP ARTHROPLASTY Bilateral 2014    Total Hip Replacement    TOTAL KNEE ARTHROPLASTY Left     TUBAL LIGATION  2018    Tubal Ligation    UPPER GASTROINTESTINAL ENDOSCOPY     [3]   Family History  Problem Relation Name Age of Onset    Hypertension Mother      Heart attack Mother      Hypertension Father      Heart attack Father      Stroke Father      Diabetes Sister      Hypertension Sister      Seizures Sister     [4]   Social History  Tobacco Use    Smoking status: Former     Current packs/day: 0.00     Types: Cigarettes     Quit date:      Years since quittin.5     Passive exposure: Past    Smokeless tobacco: Never   Vaping Use    Vaping status: Never Used   Substance Use Topics    Alcohol use: Never    Drug use: Not Currently   [5]   Past Medical History:  Diagnosis Date    Arthritis     Asthma     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    Coronary artery disease     GERD (gastroesophageal reflux disease)     Hyperlipidemia     Hypertension     Migraines     Other specified cough 2019    Post-viral cough syndrome    Personal history of other diseases of the respiratory system 2017    History of acute bronchitis with bronchospasm    Personal history of other diseases of the respiratory system 2018    History of acute sinusitis    Personal history of other drug therapy 2016    History of influenza vaccination    Personal history of other infectious and parasitic diseases 2015    History of herpes zoster    Vision loss    [6]   Past Surgical History:  Procedure Laterality Date    CARDIAC CATHETERIZATION      COLONOSCOPY      TOTAL HIP ARTHROPLASTY Bilateral 2014    Total  Hip Replacement    TOTAL KNEE ARTHROPLASTY Left     TUBAL LIGATION  2018    Tubal Ligation    UPPER GASTROINTESTINAL ENDOSCOPY     [7]   Social History  Tobacco Use    Smoking status: Former     Current packs/day: 0.00     Types: Cigarettes     Quit date:      Years since quittin.5     Passive exposure: Past    Smokeless tobacco: Never   Vaping Use    Vaping status: Never Used   Substance Use Topics    Alcohol use: Never    Drug use: Not Currently   [8]   Family History  Problem Relation Name Age of Onset    Hypertension Mother      Heart attack Mother      Hypertension Father      Heart attack Father      Stroke Father      Diabetes Sister      Hypertension Sister      Seizures Sister     [9]   Allergies  Allergen Reactions    Sulfa (Sulfonamide Antibiotics) Hives    Latex Rash    Triethanolamine Oleate Rash        Mauro Bui PA-C  [phone]

## 2025-07-30 PROCEDURE — RXMED WILLOW AMBULATORY MEDICATION CHARGE

## 2025-07-31 ENCOUNTER — PHARMACY VISIT (OUTPATIENT)
Dept: PHARMACY | Facility: CLINIC | Age: 65
End: 2025-07-31
Payer: COMMERCIAL

## 2025-08-19 ENCOUNTER — PHARMACY VISIT (OUTPATIENT)
Dept: PHARMACY | Facility: CLINIC | Age: 65
End: 2025-08-19
Payer: COMMERCIAL

## 2025-08-19 ENCOUNTER — TELEPHONE (OUTPATIENT)
Dept: PRIMARY CARE | Facility: HOSPITAL | Age: 65
End: 2025-08-19
Payer: COMMERCIAL

## 2025-08-19 DIAGNOSIS — I10 PRIMARY HYPERTENSION: ICD-10-CM

## 2025-08-19 PROCEDURE — RXMED WILLOW AMBULATORY MEDICATION CHARGE

## 2025-08-19 RX ORDER — CHLORTHALIDONE 25 MG/1
12.5 TABLET ORAL DAILY
Qty: 45 TABLET | Refills: 3 | Status: SHIPPED | OUTPATIENT
Start: 2025-08-19 | End: 2026-08-19

## 2025-08-20 PROCEDURE — RXMED WILLOW AMBULATORY MEDICATION CHARGE

## 2025-08-22 ENCOUNTER — PHARMACY VISIT (OUTPATIENT)
Dept: PHARMACY | Facility: CLINIC | Age: 65
End: 2025-08-22
Payer: COMMERCIAL

## 2025-08-26 ENCOUNTER — APPOINTMENT (OUTPATIENT)
Dept: RADIOLOGY | Facility: HOSPITAL | Age: 65
End: 2025-08-26
Payer: COMMERCIAL

## 2025-08-26 ENCOUNTER — HOSPITAL ENCOUNTER (EMERGENCY)
Facility: HOSPITAL | Age: 65
Discharge: HOME | End: 2025-08-26
Attending: EMERGENCY MEDICINE
Payer: COMMERCIAL

## 2025-08-26 VITALS
RESPIRATION RATE: 20 BRPM | SYSTOLIC BLOOD PRESSURE: 153 MMHG | DIASTOLIC BLOOD PRESSURE: 93 MMHG | WEIGHT: 185 LBS | OXYGEN SATURATION: 97 % | TEMPERATURE: 97 F | HEIGHT: 63 IN | HEART RATE: 74 BPM | BODY MASS INDEX: 32.78 KG/M2

## 2025-08-26 DIAGNOSIS — S22.32XA CLOSED FRACTURE OF ONE RIB OF LEFT SIDE, INITIAL ENCOUNTER: Primary | ICD-10-CM

## 2025-08-26 LAB
ANION GAP SERPL CALCULATED.3IONS-SCNC: 7 MMOL/L (ref 10–20)
BASOPHILS # BLD AUTO: 0.04 X10*3/UL (ref 0–0.1)
BASOPHILS NFR BLD AUTO: 0.8 %
BUN SERPL-MCNC: 15 MG/DL (ref 6–23)
CALCIUM SERPL-MCNC: 9.6 MG/DL (ref 8.6–10.3)
CHLORIDE SERPL-SCNC: 102 MMOL/L (ref 98–107)
CO2 SERPL-SCNC: 33 MMOL/L (ref 21–32)
CREAT SERPL-MCNC: 0.71 MG/DL (ref 0.5–1.05)
EGFRCR SERPLBLD CKD-EPI 2021: >90 ML/MIN/1.73M*2
EOSINOPHIL # BLD AUTO: 0.01 X10*3/UL (ref 0–0.7)
EOSINOPHIL NFR BLD AUTO: 0.2 %
ERYTHROCYTE [DISTWIDTH] IN BLOOD BY AUTOMATED COUNT: 12.9 % (ref 11.5–14.5)
GLUCOSE SERPL-MCNC: 94 MG/DL (ref 74–99)
HCT VFR BLD AUTO: 41.3 % (ref 36–46)
HGB BLD-MCNC: 14 G/DL (ref 12–16)
IMM GRANULOCYTES # BLD AUTO: 0.01 X10*3/UL (ref 0–0.7)
IMM GRANULOCYTES NFR BLD AUTO: 0.2 % (ref 0–0.9)
LYMPHOCYTES # BLD AUTO: 1.7 X10*3/UL (ref 1.2–4.8)
LYMPHOCYTES NFR BLD AUTO: 34.5 %
MCH RBC QN AUTO: 30.9 PG (ref 26–34)
MCHC RBC AUTO-ENTMCNC: 33.9 G/DL (ref 32–36)
MCV RBC AUTO: 91 FL (ref 80–100)
MONOCYTES # BLD AUTO: 0.42 X10*3/UL (ref 0.1–1)
MONOCYTES NFR BLD AUTO: 8.5 %
NEUTROPHILS # BLD AUTO: 2.75 X10*3/UL (ref 1.2–7.7)
NEUTROPHILS NFR BLD AUTO: 55.8 %
NRBC BLD-RTO: 0 /100 WBCS (ref 0–0)
PLATELET # BLD AUTO: 237 X10*3/UL (ref 150–450)
POTASSIUM SERPL-SCNC: 3.7 MMOL/L (ref 3.5–5.3)
RBC # BLD AUTO: 4.53 X10*6/UL (ref 4–5.2)
SODIUM SERPL-SCNC: 138 MMOL/L (ref 136–145)
WBC # BLD AUTO: 4.9 X10*3/UL (ref 4.4–11.3)

## 2025-08-26 PROCEDURE — 36415 COLL VENOUS BLD VENIPUNCTURE: CPT | Performed by: EMERGENCY MEDICINE

## 2025-08-26 PROCEDURE — 2550000001 HC RX 255 CONTRASTS: Performed by: EMERGENCY MEDICINE

## 2025-08-26 PROCEDURE — 99285 EMERGENCY DEPT VISIT HI MDM: CPT | Mod: 25 | Performed by: EMERGENCY MEDICINE

## 2025-08-26 PROCEDURE — 71260 CT THORAX DX C+: CPT | Performed by: RADIOLOGY

## 2025-08-26 PROCEDURE — 74177 CT ABD & PELVIS W/CONTRAST: CPT | Performed by: RADIOLOGY

## 2025-08-26 PROCEDURE — 2500000001 HC RX 250 WO HCPCS SELF ADMINISTERED DRUGS (ALT 637 FOR MEDICARE OP): Performed by: EMERGENCY MEDICINE

## 2025-08-26 PROCEDURE — 96375 TX/PRO/DX INJ NEW DRUG ADDON: CPT | Mod: 59

## 2025-08-26 PROCEDURE — 82374 ASSAY BLOOD CARBON DIOXIDE: CPT | Performed by: EMERGENCY MEDICINE

## 2025-08-26 PROCEDURE — 85025 COMPLETE CBC W/AUTO DIFF WBC: CPT | Performed by: EMERGENCY MEDICINE

## 2025-08-26 PROCEDURE — 2500000004 HC RX 250 GENERAL PHARMACY W/ HCPCS (ALT 636 FOR OP/ED): Mod: JZ | Performed by: EMERGENCY MEDICINE

## 2025-08-26 PROCEDURE — 74177 CT ABD & PELVIS W/CONTRAST: CPT

## 2025-08-26 PROCEDURE — 96374 THER/PROPH/DIAG INJ IV PUSH: CPT | Mod: 59

## 2025-08-26 PROCEDURE — 96361 HYDRATE IV INFUSION ADD-ON: CPT

## 2025-08-26 PROCEDURE — 2500000005 HC RX 250 GENERAL PHARMACY W/O HCPCS: Performed by: EMERGENCY MEDICINE

## 2025-08-26 RX ORDER — NAPROXEN 500 MG/1
500 TABLET ORAL 2 TIMES DAILY PRN
Qty: 14 TABLET | Refills: 0 | Status: SHIPPED | OUTPATIENT
Start: 2025-08-26 | End: 2025-09-02

## 2025-08-26 RX ORDER — KETOROLAC TROMETHAMINE 15 MG/ML
15 INJECTION, SOLUTION INTRAMUSCULAR; INTRAVENOUS ONCE
Status: COMPLETED | OUTPATIENT
Start: 2025-08-26 | End: 2025-08-26

## 2025-08-26 RX ORDER — LIDOCAINE 50 MG/G
1 PATCH TOPICAL DAILY PRN
Qty: 15 PATCH | Refills: 0 | Status: SHIPPED | OUTPATIENT
Start: 2025-08-26 | End: 2025-09-05

## 2025-08-26 RX ORDER — MORPHINE SULFATE 4 MG/ML
4 INJECTION, SOLUTION INTRAMUSCULAR; INTRAVENOUS ONCE
Status: COMPLETED | OUTPATIENT
Start: 2025-08-26 | End: 2025-08-26

## 2025-08-26 RX ORDER — OXYCODONE HYDROCHLORIDE 5 MG/1
5 TABLET ORAL ONCE
Refills: 0 | Status: COMPLETED | OUTPATIENT
Start: 2025-08-26 | End: 2025-08-26

## 2025-08-26 RX ORDER — OXYCODONE AND ACETAMINOPHEN 5; 325 MG/1; MG/1
1 TABLET ORAL EVERY 6 HOURS PRN
Qty: 12 TABLET | Refills: 0 | Status: SHIPPED | OUTPATIENT
Start: 2025-08-26 | End: 2025-08-29

## 2025-08-26 RX ORDER — ONDANSETRON HYDROCHLORIDE 2 MG/ML
4 INJECTION, SOLUTION INTRAVENOUS ONCE
Status: COMPLETED | OUTPATIENT
Start: 2025-08-26 | End: 2025-08-26

## 2025-08-26 RX ORDER — ACETAMINOPHEN 325 MG/1
975 TABLET ORAL ONCE
Status: COMPLETED | OUTPATIENT
Start: 2025-08-26 | End: 2025-08-26

## 2025-08-26 RX ORDER — LIDOCAINE 560 MG/1
1 PATCH PERCUTANEOUS; TOPICAL; TRANSDERMAL ONCE
Status: DISCONTINUED | OUTPATIENT
Start: 2025-08-26 | End: 2025-08-26 | Stop reason: HOSPADM

## 2025-08-26 RX ADMIN — SODIUM CHLORIDE 500 ML: 900 INJECTION, SOLUTION INTRAVENOUS at 08:30

## 2025-08-26 RX ADMIN — LIDOCAINE 4% 1 PATCH: 40 PATCH TOPICAL at 08:30

## 2025-08-26 RX ADMIN — ONDANSETRON 4 MG: 2 INJECTION, SOLUTION INTRAMUSCULAR; INTRAVENOUS at 08:30

## 2025-08-26 RX ADMIN — IOHEXOL 75 ML: 350 INJECTION, SOLUTION INTRAVENOUS at 08:18

## 2025-08-26 RX ADMIN — MORPHINE SULFATE 4 MG: 4 INJECTION, SOLUTION INTRAMUSCULAR; INTRAVENOUS at 08:30

## 2025-08-26 RX ADMIN — KETOROLAC TROMETHAMINE 15 MG: 15 INJECTION, SOLUTION INTRAMUSCULAR; INTRAVENOUS at 08:30

## 2025-08-26 RX ADMIN — ACETAMINOPHEN 975 MG: 325 TABLET ORAL at 08:30

## 2025-08-26 RX ADMIN — OXYCODONE HYDROCHLORIDE 5 MG: 5 TABLET ORAL at 10:31

## 2025-08-26 ASSESSMENT — PAIN - FUNCTIONAL ASSESSMENT: PAIN_FUNCTIONAL_ASSESSMENT: 0-10

## 2025-08-26 ASSESSMENT — PAIN SCALES - GENERAL: PAINLEVEL_OUTOF10: 7

## 2025-08-26 ASSESSMENT — LIFESTYLE VARIABLES
HAVE YOU EVER FELT YOU SHOULD CUT DOWN ON YOUR DRINKING: NO
TOTAL SCORE: 0
EVER HAD A DRINK FIRST THING IN THE MORNING TO STEADY YOUR NERVES TO GET RID OF A HANGOVER: NO
HAVE PEOPLE ANNOYED YOU BY CRITICIZING YOUR DRINKING: NO
EVER FELT BAD OR GUILTY ABOUT YOUR DRINKING: NO

## 2025-08-26 ASSESSMENT — PAIN DESCRIPTION - LOCATION: LOCATION: OTHER (COMMENT)

## 2025-08-26 ASSESSMENT — PAIN DESCRIPTION - PAIN TYPE: TYPE: ACUTE PAIN

## 2025-08-26 ASSESSMENT — PAIN DESCRIPTION - ORIENTATION: ORIENTATION: LEFT

## 2025-08-28 ENCOUNTER — OFFICE VISIT (OUTPATIENT)
Dept: PRIMARY CARE | Facility: HOSPITAL | Age: 65
End: 2025-08-28
Payer: COMMERCIAL

## 2025-08-28 ENCOUNTER — PHARMACY VISIT (OUTPATIENT)
Dept: PHARMACY | Facility: CLINIC | Age: 65
End: 2025-08-28
Payer: COMMERCIAL

## 2025-08-28 ENCOUNTER — HOSPITAL ENCOUNTER (EMERGENCY)
Facility: HOSPITAL | Age: 65
Discharge: HOME | End: 2025-08-28
Attending: EMERGENCY MEDICINE
Payer: COMMERCIAL

## 2025-08-28 ENCOUNTER — APPOINTMENT (OUTPATIENT)
Dept: RADIOLOGY | Facility: HOSPITAL | Age: 65
End: 2025-08-28
Payer: COMMERCIAL

## 2025-08-28 VITALS
TEMPERATURE: 96.6 F | BODY MASS INDEX: 34.2 KG/M2 | HEART RATE: 80 BPM | OXYGEN SATURATION: 97 % | WEIGHT: 193 LBS | HEIGHT: 63 IN | DIASTOLIC BLOOD PRESSURE: 85 MMHG | SYSTOLIC BLOOD PRESSURE: 147 MMHG

## 2025-08-28 VITALS
TEMPERATURE: 97.7 F | DIASTOLIC BLOOD PRESSURE: 74 MMHG | HEART RATE: 80 BPM | WEIGHT: 193 LBS | SYSTOLIC BLOOD PRESSURE: 150 MMHG | RESPIRATION RATE: 18 BRPM | BODY MASS INDEX: 34.2 KG/M2 | OXYGEN SATURATION: 98 % | HEIGHT: 63 IN

## 2025-08-28 DIAGNOSIS — T40.2X5A CONSTIPATION DUE TO OPIOID THERAPY: Primary | ICD-10-CM

## 2025-08-28 DIAGNOSIS — K59.03 CONSTIPATION DUE TO OPIOID THERAPY: Primary | ICD-10-CM

## 2025-08-28 DIAGNOSIS — K59.00 CONSTIPATION, UNSPECIFIED CONSTIPATION TYPE: Primary | ICD-10-CM

## 2025-08-28 DIAGNOSIS — Z87.81 HISTORY OF FRACTURE OF RIB: ICD-10-CM

## 2025-08-28 LAB
APPEARANCE UR: CLEAR
BILIRUB UR STRIP.AUTO-MCNC: NEGATIVE MG/DL
COLOR UR: COLORLESS
GLUCOSE UR STRIP.AUTO-MCNC: NORMAL MG/DL
KETONES UR STRIP.AUTO-MCNC: NEGATIVE MG/DL
LEUKOCYTE ESTERASE UR QL STRIP.AUTO: NEGATIVE
NITRITE UR QL STRIP.AUTO: NEGATIVE
PH UR STRIP.AUTO: 7.5 [PH]
PROT UR STRIP.AUTO-MCNC: NEGATIVE MG/DL
RBC # UR STRIP.AUTO: NEGATIVE MG/DL
SP GR UR STRIP.AUTO: 1.01
UROBILINOGEN UR STRIP.AUTO-MCNC: NORMAL MG/DL

## 2025-08-28 PROCEDURE — 99284 EMERGENCY DEPT VISIT MOD MDM: CPT | Performed by: EMERGENCY MEDICINE

## 2025-08-28 PROCEDURE — 74018 RADEX ABDOMEN 1 VIEW: CPT

## 2025-08-28 PROCEDURE — 99211 OFF/OP EST MAY X REQ PHY/QHP: CPT

## 2025-08-28 PROCEDURE — 51798 US URINE CAPACITY MEASURE: CPT

## 2025-08-28 PROCEDURE — RXMED WILLOW AMBULATORY MEDICATION CHARGE

## 2025-08-28 PROCEDURE — 81003 URINALYSIS AUTO W/O SCOPE: CPT | Performed by: EMERGENCY MEDICINE

## 2025-08-28 RX ORDER — POLYETHYLENE GLYCOL 3350 17 G/17G
17 POWDER, FOR SOLUTION ORAL DAILY
Qty: 1530 G | Refills: 1 | Status: SHIPPED | OUTPATIENT
Start: 2025-08-28 | End: 2026-02-24

## 2025-08-28 RX ORDER — TRAMADOL HYDROCHLORIDE 50 MG/1
50 TABLET, FILM COATED ORAL EVERY 6 HOURS PRN
Qty: 12 TABLET | Refills: 0 | Status: SHIPPED | OUTPATIENT
Start: 2025-08-28 | End: 2025-08-31

## 2025-08-28 RX ORDER — TRAMADOL HYDROCHLORIDE 50 MG/1
50 TABLET, FILM COATED ORAL EVERY 6 HOURS PRN
Qty: 12 TABLET | Refills: 0 | Status: SHIPPED | OUTPATIENT
Start: 2025-08-28 | End: 2025-08-28

## 2025-08-28 RX ORDER — AMOXICILLIN 250 MG
1 CAPSULE ORAL DAILY
Qty: 30 TABLET | Refills: 11 | Status: SHIPPED | OUTPATIENT
Start: 2025-08-28 | End: 2026-08-28

## 2025-08-28 ASSESSMENT — PAIN DESCRIPTION - FREQUENCY: FREQUENCY: CONSTANT/CONTINUOUS

## 2025-08-28 ASSESSMENT — PAIN DESCRIPTION - DESCRIPTORS: DESCRIPTORS: ACHING

## 2025-08-28 ASSESSMENT — PAIN DESCRIPTION - LOCATION: LOCATION: ABDOMEN

## 2025-08-28 ASSESSMENT — ENCOUNTER SYMPTOMS
LOSS OF SENSATION IN FEET: 0
OCCASIONAL FEELINGS OF UNSTEADINESS: 0

## 2025-08-28 ASSESSMENT — PAIN SCALES - GENERAL
PAINLEVEL_OUTOF10: 10 - WORST POSSIBLE PAIN
PAINLEVEL_OUTOF10: 8

## 2025-08-28 ASSESSMENT — PAIN DESCRIPTION - PROGRESSION: CLINICAL_PROGRESSION: NOT CHANGED

## 2025-08-28 ASSESSMENT — PAIN - FUNCTIONAL ASSESSMENT: PAIN_FUNCTIONAL_ASSESSMENT: 0-10

## 2025-08-28 ASSESSMENT — PAIN DESCRIPTION - ONSET: ONSET: ONGOING

## 2025-08-28 ASSESSMENT — PAIN DESCRIPTION - PAIN TYPE: TYPE: ACUTE PAIN

## 2025-09-03 ENCOUNTER — TELEPHONE (OUTPATIENT)
Dept: PRIMARY CARE | Facility: HOSPITAL | Age: 65
End: 2025-09-03
Payer: COMMERCIAL

## 2025-09-03 PROBLEM — J44.9 CHRONIC OBSTRUCTIVE PULMONARY DISEASE, UNSPECIFIED COPD TYPE (MULTI): Status: ACTIVE | Noted: 2025-09-03

## (undated) DEVICE — HEAD POSITIONER, UNIVERSAL, DISP

## (undated) DEVICE — TORNIER PERFORM HUMERAL SYSTEM GUIDE PIN

## (undated) DEVICE — AEQUALIS PERFORM+ GUIDE PIN, 2.5 X 220MM

## (undated) DEVICE — DRESSING, GAUZE, PETROLATUM, PATCH, XEROFORM, 1 X 8 IN, STERILE

## (undated) DEVICE — TIP, SUCTION, YANKAUER, FLEXIBLE

## (undated) DEVICE — HIGH FLOW TIP FOR INTERPULSE HANDPIECE SET

## (undated) DEVICE — MIXER, CEMENT, MIXEVAC III HIGH VACUUM KIT, STERILE

## (undated) DEVICE — Device

## (undated) DEVICE — SUTURE, VICRYL, 0, 27 IN, CP-1, UNDYED

## (undated) DEVICE — BLADE, SAGITTAL, THIN, SHORT, LF

## (undated) DEVICE — SUTURE, FIBERWIRE 2, T-5 TAPER NEEDLE, 38"

## (undated) DEVICE — DRESSING, ABDOMINAL, WET PRUF, TENDERSORB, 5 X 9 IN, STERILE

## (undated) DEVICE — APPLICATOR, CHLORAPREP, W/ORANGE TINT, 26ML

## (undated) DEVICE — CLOSURE SYSTEM, DERMABOND, PRINEO, 22CM, STERILE

## (undated) DEVICE — SUTURE, STRATAFIX, 3-0, SPIRAL MONOCRYL PLUS, PS, 70CM, UNDYED

## (undated) DEVICE — NEEDLE, ELECTRODE, ELECTROSURGICAL, INSULATED

## (undated) DEVICE — GUIDE PIN, 3 X 75MM, STERILE

## (undated) DEVICE — SOLUTION, IRRIGATION, STERILE WATER, 1000 ML, POUR BOTTLE

## (undated) DEVICE — SOLUTION, IRRIGATION, SODIUM CHLORIDE 0.9%, 1000 ML, POUR BOTTLE

## (undated) DEVICE — DRESSING, GAUZE, SUPER KERLIX, 6X6

## (undated) DEVICE — DRAPE, SHEET, U, STERI DRAPE, 47 X 51 IN, DISPOSABLE, STERILE

## (undated) DEVICE — DRAPE, INCISE, ANTIMICROBIAL, IOBAN 2, STERI DRAPE, 23 X 33 IN, DISPOSABLE, STERILE

## (undated) DEVICE — TOWEL PACK, STERILE, 4/PACK, BLUE

## (undated) DEVICE — TAPE, SURGICAL, FOAM, MICROFOAM, HYPOALLERGENIC, 3 IN X 5.5 YD

## (undated) DEVICE — DRESSING, MEPILEX BORDER, POST-OP AG, 4 X 10 IN

## (undated) DEVICE — GLOVE, SURGICAL, PROTEXIS PI MICRO, 8.0, PF, LF

## (undated) DEVICE — DRAPE, INSTRUMENT, W/POUCH, STERI DRAPE, 7 X 11 IN, DISPOSABLE, STERILE

## (undated) DEVICE — KIT, BEACH CHAIR TRIMANO

## (undated) DEVICE — SUTURE, MONOCRYL, 3-0, 27 IN, PS-2, UNDYED